# Patient Record
Sex: FEMALE | Race: WHITE | NOT HISPANIC OR LATINO | Employment: FULL TIME | URBAN - METROPOLITAN AREA
[De-identification: names, ages, dates, MRNs, and addresses within clinical notes are randomized per-mention and may not be internally consistent; named-entity substitution may affect disease eponyms.]

---

## 2023-12-18 ENCOUNTER — APPOINTMENT (OUTPATIENT)
Dept: RADIOLOGY | Facility: HOSPITAL | Age: 73
End: 2023-12-18
Payer: MEDICARE

## 2023-12-18 ENCOUNTER — HOSPITAL ENCOUNTER (EMERGENCY)
Facility: HOSPITAL | Age: 73
Discharge: HOME | End: 2023-12-18
Payer: MEDICARE

## 2023-12-18 VITALS
BODY MASS INDEX: 25.59 KG/M2 | TEMPERATURE: 98.1 F | WEIGHT: 149.91 LBS | RESPIRATION RATE: 15 BRPM | HEIGHT: 64 IN | OXYGEN SATURATION: 98 % | SYSTOLIC BLOOD PRESSURE: 150 MMHG | DIASTOLIC BLOOD PRESSURE: 89 MMHG | HEART RATE: 75 BPM

## 2023-12-18 DIAGNOSIS — M25.562 ACUTE PAIN OF LEFT KNEE: Primary | ICD-10-CM

## 2023-12-18 PROCEDURE — 2500000004 HC RX 250 GENERAL PHARMACY W/ HCPCS (ALT 636 FOR OP/ED): Performed by: EMERGENCY MEDICINE

## 2023-12-18 PROCEDURE — 99283 EMERGENCY DEPT VISIT LOW MDM: CPT | Mod: 25

## 2023-12-18 PROCEDURE — 73564 X-RAY EXAM KNEE 4 OR MORE: CPT | Mod: LT,FY

## 2023-12-18 RX ORDER — PREDNISONE 20 MG/1
40 TABLET ORAL ONCE
Status: COMPLETED | OUTPATIENT
Start: 2023-12-18 | End: 2023-12-18

## 2023-12-18 RX ORDER — METHYLPREDNISOLONE 4 MG/1
TABLET ORAL
Qty: 21 TABLET | Refills: 0 | Status: SHIPPED | OUTPATIENT
Start: 2023-12-18 | End: 2023-12-25

## 2023-12-18 RX ADMIN — PREDNISONE 40 MG: 20 TABLET ORAL at 05:04

## 2023-12-18 ASSESSMENT — COLUMBIA-SUICIDE SEVERITY RATING SCALE - C-SSRS
2. HAVE YOU ACTUALLY HAD ANY THOUGHTS OF KILLING YOURSELF?: NO
1. IN THE PAST MONTH, HAVE YOU WISHED YOU WERE DEAD OR WISHED YOU COULD GO TO SLEEP AND NOT WAKE UP?: NO
6. HAVE YOU EVER DONE ANYTHING, STARTED TO DO ANYTHING, OR PREPARED TO DO ANYTHING TO END YOUR LIFE?: NO

## 2023-12-18 ASSESSMENT — PAIN DESCRIPTION - DESCRIPTORS: DESCRIPTORS: ACHING

## 2023-12-18 NOTE — DISCHARGE INSTRUCTIONS
Continue using Tylenol for pain.  You can use ice for pain and swelling.  Use your knee brace to help stabilize your knee.    Follow-up with your orthopedic doctor as planned.    Turn to the ER if you have increased pain swelling or new symptoms like redness.

## 2023-12-18 NOTE — ED PROVIDER NOTES
HPI   Chief Complaint   Patient presents with    Knee Pain     Pt states she has chronic left knee pain due to arthritits.  Pt states that the pain started getting worse and swelling Friday.  Pt states she went to  and had xrays done that showed to fractures.       73-year-old female with a history of bilateral knee pain and arthritis presents ER with acute knee pain on the left that started around Friday.  Patient gets increased pain in the joints when she exerts her and has had similar joint swelling in the past.  Chronic pain from osteoarthritis.  This is more severe increased pain and swelling.  No redness fever or direct trauma.  She twisted her knee on Friday.  She was seen at urgent care and given crutches.  No imaging.  No improvement with Tylenol.    No pain in the hip or ankle.  Walking with crutches.    Has appointment with physical therapy in the future.  Plans to make an appointment with orthopedics.  History of getting gel shots in the knee.                          No data recorded                Patient History   No past medical history on file.  No past surgical history on file.  No family history on file.  Social History     Tobacco Use    Smoking status: Not on file    Smokeless tobacco: Not on file   Substance Use Topics    Alcohol use: Not on file    Drug use: Not on file       Physical Exam   ED Triage Vitals [12/18/23 0406]   Temp Heart Rate Resp BP   36.7 °C (98.1 °F) 75 15 150/89      SpO2 Temp Source Heart Rate Source Patient Position   98 % Oral Monitor Sitting      BP Location FiO2 (%)     Right arm --       Physical Exam  Vitals and nursing note reviewed.     Left leg  General: Well-appearing, no distress.  Vascular: Dorsalis pedis pulse present, normal color  Neuro: Normal sensation in the left leg.  Normal dorsi and plantarflex  Musculoskeletal: No ankle or distal tib-fib tenderness.  Patient has diffuse moderate swelling of the knee.  Tender anterior knee.  There is a patella  slightly obscured by swelling.  No laxity.  Exam of laxity limited by swelling and pain.  No ecchymotic  Skin: No ecchymosis or erythema.  Not warm to touch    ED Course & MDM   Diagnoses as of 12/18/23 0449   Acute pain of left knee       Medical Decision Making  Left knee pain: Acute knee pain after twisting the knee.  Neurovascular intact.  No signs of infection.  No history that would suggest knee dislocation.  Possible internal derangement of the knee.  Exam of laxity limited by swelling and pain.  No fracture on x-rays interpreted by me.  Mechanism is not consistent with fracture.  Patient is using crutches weightbearing as tolerated.  She will use a knee brace that she had for a knee fracture in the past.  Refused knee immobilizer.    Patient has appointment with orthopedic and joint pain specialist.  Prescribed prednisone at her request since that has helped in the past for knee pain and swelling    Patient will return to the ER if her knee pain does not improve in 2 to 3 days or gets worse including new symptoms like fever redness and weakness or laxity in the joint.    Discussed all test result, the clinical pression, treatment the ER and at home.  Patient agreed with plan.    Discharged home stable        Procedure  Procedures     Omar Browne MD  12/18/23 7908

## 2023-12-26 ENCOUNTER — TELEPHONE (OUTPATIENT)
Dept: PHYSICAL MEDICINE AND REHAB | Facility: CLINIC | Age: 73
End: 2023-12-26
Payer: MEDICARE

## 2023-12-26 NOTE — TELEPHONE ENCOUNTER
Per central scheduling, pt wants an MRI ordered prior to appt.  I called pt, she is new, cannot order anything until seen/ evaluated.  Reiterated she is on the CXL list and I will let her know if a CXL arises for her (appt is 1/9/24)

## 2024-01-02 ENCOUNTER — OFFICE VISIT (OUTPATIENT)
Dept: PRIMARY CARE | Facility: CLINIC | Age: 74
End: 2024-01-02
Payer: MEDICARE

## 2024-01-02 ENCOUNTER — LAB (OUTPATIENT)
Dept: LAB | Facility: LAB | Age: 74
End: 2024-01-02
Payer: MEDICARE

## 2024-01-02 VITALS
RESPIRATION RATE: 16 BRPM | SYSTOLIC BLOOD PRESSURE: 156 MMHG | BODY MASS INDEX: 25.33 KG/M2 | DIASTOLIC BLOOD PRESSURE: 84 MMHG | TEMPERATURE: 97.7 F | OXYGEN SATURATION: 99 % | HEIGHT: 64 IN | WEIGHT: 148.4 LBS | HEART RATE: 69 BPM

## 2024-01-02 DIAGNOSIS — R03.0 ELEVATED BLOOD PRESSURE READING: ICD-10-CM

## 2024-01-02 DIAGNOSIS — E03.9 HYPOTHYROIDISM, UNSPECIFIED TYPE: ICD-10-CM

## 2024-01-02 DIAGNOSIS — M25.562 ARTHRALGIA OF LEFT KNEE: ICD-10-CM

## 2024-01-02 DIAGNOSIS — M54.50 LUMBAR SPINE PAIN: Primary | ICD-10-CM

## 2024-01-02 LAB
TSH SERPL-ACNC: 1.25 MIU/L (ref 0.44–3.98)
URATE SERPL-MCNC: 4 MG/DL (ref 2.3–6.7)

## 2024-01-02 PROCEDURE — 99214 OFFICE O/P EST MOD 30 MIN: CPT | Performed by: NURSE PRACTITIONER

## 2024-01-02 PROCEDURE — 1036F TOBACCO NON-USER: CPT | Performed by: NURSE PRACTITIONER

## 2024-01-02 PROCEDURE — 36415 COLL VENOUS BLD VENIPUNCTURE: CPT

## 2024-01-02 PROCEDURE — 1159F MED LIST DOCD IN RCRD: CPT | Performed by: NURSE PRACTITIONER

## 2024-01-02 PROCEDURE — 84550 ASSAY OF BLOOD/URIC ACID: CPT

## 2024-01-02 PROCEDURE — 84443 ASSAY THYROID STIM HORMONE: CPT

## 2024-01-02 PROCEDURE — 1125F AMNT PAIN NOTED PAIN PRSNT: CPT | Performed by: NURSE PRACTITIONER

## 2024-01-02 PROCEDURE — 99204 OFFICE O/P NEW MOD 45 MIN: CPT | Performed by: NURSE PRACTITIONER

## 2024-01-02 RX ORDER — ATORVASTATIN CALCIUM 20 MG/1
1 TABLET, FILM COATED ORAL DAILY
COMMUNITY
Start: 2015-08-09 | End: 2024-05-20

## 2024-01-02 RX ORDER — LEVOTHYROXINE SODIUM 88 UG/1
88 TABLET ORAL DAILY
COMMUNITY
End: 2024-05-09 | Stop reason: SDUPTHER

## 2024-01-02 RX ORDER — LISINOPRIL 10 MG/1
10 TABLET ORAL DAILY
COMMUNITY
End: 2024-01-02

## 2024-01-02 RX ORDER — LISINOPRIL 10 MG/1
10 TABLET ORAL DAILY
COMMUNITY
Start: 2016-03-05

## 2024-01-02 RX ORDER — LEVOTHYROXINE SODIUM 88 UG/1
CAPSULE ORAL
COMMUNITY
End: 2024-01-02

## 2024-01-02 ASSESSMENT — ENCOUNTER SYMPTOMS
WEAKNESS: 1
NUMBNESS: 1
BACK PAIN: 1
LEG PAIN: 1
JOINT SWELLING: 1
ARTHRALGIAS: 1

## 2024-01-02 ASSESSMENT — PAIN SCALES - GENERAL: PAINLEVEL: 8

## 2024-01-02 ASSESSMENT — PATIENT HEALTH QUESTIONNAIRE - PHQ9
1. LITTLE INTEREST OR PLEASURE IN DOING THINGS: NOT AT ALL
SUM OF ALL RESPONSES TO PHQ9 QUESTIONS 1 AND 2: 0
2. FEELING DOWN, DEPRESSED OR HOPELESS: NOT AT ALL

## 2024-01-02 NOTE — PROGRESS NOTES
"Subjective   Patient ID: July Castillo is a 73 y.o. female who presents for new pt (New pt. Pt here for back pain that radiates down left leg. Pt states this  was aggravated during her move in October. Pt will be seeing pain mgmt on 1/9/24).    Daughter is a nurse  anessthesist at [employer] , Just moved from [place]  to be with her family ansd grandchildren Has been back for a few months,  pt used to see jared team has had ongoing back pain, . Has been treated for with injections for her back, for years .,has knee pain left knee which is new  was seen in urgent care for this, has an appointment with pain management. A week from today jan 9th. Has had gel injection S in both knees. Has had no issues, with ijjections ,seeing laila teran management.  Here to get established    Back Pain  This is a chronic problem. The current episode started more than 1 year ago. The problem occurs constantly. The problem has been rapidly worsening since onset. The pain is present in the lumbar spine. The quality of the pain is described as shooting and burning. The pain radiates to the left knee. The pain is moderate. The symptoms are aggravated by standing and lying down. Stiffness is present All day. Associated symptoms include leg pain, numbness and weakness. Risk factors include menopause and history of osteoporosis. She has tried ice for the symptoms. The treatment provided moderate relief.        Review of Systems   Endocrine:        Reviwed thyroid results from prec=vious doc, tyroid labs off   Musculoskeletal:  Positive for arthralgias, back pain and joint swelling.   Neurological:  Positive for weakness and numbness.       Objective   /84   Pulse 69   Temp 36.5 °C (97.7 °F)   Resp 16   Ht 1.626 m (5' 4\")   Wt 67.3 kg (148 lb 6.4 oz)   SpO2 99%   BMI 25.47 kg/m²     Physical Exam  Constitutional:       Appearance: Normal appearance.   HENT:      Head: Normocephalic.   Cardiovascular:      Rate and Rhythm: " Normal rate and regular rhythm.   Musculoskeletal:      Cervical back: Normal range of motion and neck supple.      Comments: Left knee very swollen tender to touch around minicus, gait nl tender ROM  has lower back pain  reviewed xray and mri reports from another institution.     Neurological:      Mental Status: She is alert.         Assessment/Plan   Problem List Items Addressed This Visit    None  Visit Diagnoses         Codes    Lumbar spine pain    -  Primary M54.50    Relevant Orders    MR lumbar spine wo IV contrast    Arthralgia of left knee     M25.562    Relevant Orders    Referral to Orthopaedic Surgery    MR knee left wo IV contrast    Uric acid    Hypothyroidism, unspecified type     E03.9    Relevant Orders    Tsh With Reflex To Free T4 If Abnormal    Elevated blood pressure reading     R03.0

## 2024-01-02 NOTE — PATIENT INSTRUCTIONS
Practice explained discussed treatment will see ortho for knee will follow up  with pain management for back pain. Return for well visit when free pleasure to take care of you .

## 2024-01-09 ENCOUNTER — ANCILLARY PROCEDURE (OUTPATIENT)
Dept: RADIOLOGY | Facility: CLINIC | Age: 74
End: 2024-01-09
Payer: MEDICARE

## 2024-01-09 ENCOUNTER — APPOINTMENT (OUTPATIENT)
Dept: PHYSICAL MEDICINE AND REHAB | Facility: CLINIC | Age: 74
End: 2024-01-09
Payer: MEDICARE

## 2024-01-09 DIAGNOSIS — M54.50 LUMBAR SPINE PAIN: ICD-10-CM

## 2024-01-09 DIAGNOSIS — M25.562 ARTHRALGIA OF LEFT KNEE: ICD-10-CM

## 2024-01-09 PROCEDURE — 72148 MRI LUMBAR SPINE W/O DYE: CPT

## 2024-01-09 PROCEDURE — 73721 MRI JNT OF LWR EXTRE W/O DYE: CPT | Mod: LT

## 2024-02-06 ENCOUNTER — APPOINTMENT (OUTPATIENT)
Dept: PHYSICAL MEDICINE AND REHAB | Facility: CLINIC | Age: 74
End: 2024-02-06
Payer: MEDICARE

## 2024-03-28 ENCOUNTER — PATIENT MESSAGE (OUTPATIENT)
Dept: PRIMARY CARE | Facility: CLINIC | Age: 74
End: 2024-03-28
Payer: MEDICARE

## 2024-03-28 DIAGNOSIS — L98.9 SORE ON SCALP: ICD-10-CM

## 2024-03-28 RX ORDER — CLINDAMYCIN PHOSPHATE 11.9 MG/ML
SOLUTION TOPICAL DAILY
Qty: 60 ML | Refills: 0 | Status: SHIPPED | OUTPATIENT
Start: 2024-03-28 | End: 2024-05-03 | Stop reason: WASHOUT

## 2024-03-28 RX ORDER — CLINDAMYCIN PHOSPHATE 11.9 MG/ML
SOLUTION TOPICAL 2 TIMES DAILY
Qty: 1 ML | Refills: 11 | Status: SHIPPED | OUTPATIENT
Start: 2024-03-28 | End: 2025-03-28

## 2024-03-28 NOTE — TELEPHONE ENCOUNTER
From: July Castillo  To: Concepcion Hess, APRN-CNP  Sent: 3/28/2024 10:52 AM EDT  Subject: Clindamycin Phosphate Topical Solution USP, 1%    Good morning Concepcion & staff ~ happy spring! Doing well in my new home & am meeting in the coming days with Dr. Wright, neurological surgery, to discuss my lumbar spine issues. My daughter in law works with him at [facility] & he receives excellent reviews. I’ll keep you updated. If surgery is required, I will choose either Dr Wright or Dr Huffman.    Regarding the above subject solution, I have been using this topically for my occasional scalp sores that are a result of food allergies. Could you please call in a new prescription to [facility] or would you need to see me first?    As always, thank you & enjoy the holiday weekend! Kindest regards ~ July Castillo   [phone]

## 2024-04-01 DIAGNOSIS — M47.816 LUMBAR SPONDYLOSIS: ICD-10-CM

## 2024-04-03 NOTE — PROGRESS NOTES
Chief Complaint:   July Castillo is a very nice 73 y.o. woman here for evaluation of low back pain, leg pain and weakness, lumbar spinal stenosis .    HPI  Ms. Castillo has history of chronic low back pain that started almost 10 years ago.  She has lived both here in [place] but also in [place].  Recently, she moved back to [place] permanently to be closer to her Grandchildren.  The pain goes down both legs posterior lateral thigh more in the left leg and more anterior thigh with numbness and tingling in the right leg.  Her leg pain bothers her more than her low back pain.  Bending forward to tie her shoes for example is extremely painful.  She has had injections by pain management for her low back symptoms over the years both here in [place] and in [place] with temporary relief.  She did aggressive land and aquatic physical therapy in [place] about 2 years ago and continues to do home exercises, but starting in January her pain got so severe and her left leg feels weak that she is not able to do the exercises as well.  She takes Tylenol for her pain as well and is allergic to NSAIDs.  She is a never smoker.  She does have osteopenia and takes calcium and vitamin D for this.      Review of Systems  All other systems reviewed and negative other than what is already stated in this note.      Past Medical History:   Diagnosis Date    Allergic 2010    Arthritis 2010    Disease of thyroid gland 2010    Heart murmur Child    Hypertension 2010       There is no problem list on file for this patient.      Past Surgical History:   Procedure Laterality Date    WISDOM TOOTH EXTRACTION  1980       Family History   Problem Relation Name Age of Onset    Arthritis Mother Aurora     Hypertension Mother Aurora     Kidney disease Mother Aurora     Heart disease Father Tate     Hypertension Father Tate     Stroke Father Tate        Social History     Tobacco Use    Smoking status: Never    Smokeless tobacco: Never   Substance Use  Topics    Alcohol use: Yes     Alcohol/week: 3.0 standard drinks of alcohol     Types: 3 Glasses of wine per week    Drug use: Never         Current Outpatient Medications:     atorvastatin (Lipitor) 20 mg tablet, Take 1 tablet (20 mg) by mouth once daily., Disp: , Rfl:     clindamycin (Cleocin T) 1 % external solution, Apply topically once daily., Disp: 60 mL, Rfl: 0    clindamycin (Cleocin T) 1 % external solution, Apply topically 2 times a day., Disp: 1 mL, Rfl: 11    levothyroxine (Synthroid, Levoxyl) 88 mcg tablet, Take 1 tablet (88 mcg) by mouth once daily., Disp: , Rfl:     lisinopril 10 mg tablet, , Disp: , Rfl:         Objective   There were no vitals filed for this visit.    no acute distress, well developed woman appearing her  stated age  normal sclera  moist mucus membranes  no peripheral edema   symmetric chest rise  nondistended abdomen  alert and oriented, pupils equal and round, extraocular movements intact, full strength in all extremities, normal sensation to light touch throughout, normal symmetric reflexes, no dysmetria  normal mood      Assessment/Plan   I personally reviewed MRI of the lumbar spine done on 1/9/2024 and upright x-rays of the lumbar spine done today.  There is multilevel degenerative disc disease worst at the L3-4 and L4-5 levels consistent with lumbar spondylosis.  There is mobile spondylolisthesis at the L4-5 level with 5 mm of anterolisthesis on lying down MRI and 8 mm of anterolisthesis on standing upright x-ray.  There is severe spinal canal stenosis at the L4-5 level with near obliteration of the spinal canal at this level.  There is a moderate spinal canal stenosis at the L3-4 level and severe right foraminal stenosis at the L3-4 level.  Because of the severity of neural compression, mobile spondylolisthesis, symptoms of neurogenic claudication and lumbar radiculopathy that has failed the conservative measures listed in the HPI section including extensive physical therapy  and home exercises and injections by pain management, I recommended surgery via a L3-L5 posterior lumbar laminectomy, right side facetectomies and instrumented transforaminal lumbar interbody fusion (TLIF).  I explained the surgery and risks of the surgery including weakness, numbness, csf leak, infection, pseudoarthrosis, adjacent segment disease, hardware failure, and spinal instability.         Demetrius Wright MD

## 2024-04-04 ENCOUNTER — OFFICE VISIT (OUTPATIENT)
Dept: NEUROSURGERY | Facility: CLINIC | Age: 74
End: 2024-04-04
Payer: MEDICARE

## 2024-04-04 ENCOUNTER — HOSPITAL ENCOUNTER (OUTPATIENT)
Dept: RADIOLOGY | Facility: HOSPITAL | Age: 74
Discharge: HOME | End: 2024-04-04
Payer: MEDICARE

## 2024-04-04 ENCOUNTER — TELEPHONE (OUTPATIENT)
Dept: PRIMARY CARE | Facility: CLINIC | Age: 74
End: 2024-04-04

## 2024-04-04 VITALS
HEART RATE: 80 BPM | DIASTOLIC BLOOD PRESSURE: 87 MMHG | SYSTOLIC BLOOD PRESSURE: 164 MMHG | HEIGHT: 64 IN | BODY MASS INDEX: 25.27 KG/M2 | WEIGHT: 148 LBS

## 2024-04-04 DIAGNOSIS — M47.816 LUMBAR SPONDYLOSIS: ICD-10-CM

## 2024-04-04 DIAGNOSIS — M81.0 AGE RELATED OSTEOPOROSIS, UNSPECIFIED PATHOLOGICAL FRACTURE PRESENCE: Primary | ICD-10-CM

## 2024-04-04 DIAGNOSIS — M54.16 LUMBAR RADICULOPATHY: ICD-10-CM

## 2024-04-04 DIAGNOSIS — M47.27 LUMBOSACRAL SPONDYLOSIS WITH RADICULOPATHY: Primary | ICD-10-CM

## 2024-04-04 DIAGNOSIS — M43.17 ACQUIRED SPONDYLOLISTHESIS OF LUMBOSACRAL REGION: ICD-10-CM

## 2024-04-04 DIAGNOSIS — Z12.31 ENCOUNTER FOR SCREENING MAMMOGRAM FOR MALIGNANT NEOPLASM OF BREAST: Primary | ICD-10-CM

## 2024-04-04 PROCEDURE — 1125F AMNT PAIN NOTED PAIN PRSNT: CPT | Performed by: NEUROLOGICAL SURGERY

## 2024-04-04 PROCEDURE — 72100 X-RAY EXAM L-S SPINE 2/3 VWS: CPT | Performed by: RADIOLOGY

## 2024-04-04 PROCEDURE — 72100 X-RAY EXAM L-S SPINE 2/3 VWS: CPT

## 2024-04-04 PROCEDURE — 1159F MED LIST DOCD IN RCRD: CPT | Performed by: NEUROLOGICAL SURGERY

## 2024-04-04 PROCEDURE — 99205 OFFICE O/P NEW HI 60 MIN: CPT | Performed by: NEUROLOGICAL SURGERY

## 2024-04-04 ASSESSMENT — PAIN SCALES - GENERAL: PAINLEVEL: 8

## 2024-04-04 ASSESSMENT — ENCOUNTER SYMPTOMS
LOSS OF SENSATION IN FEET: 0
OCCASIONAL FEELINGS OF UNSTEADINESS: 0
DEPRESSION: 0

## 2024-04-09 ENCOUNTER — HOSPITAL ENCOUNTER (OUTPATIENT)
Dept: RADIOLOGY | Facility: HOSPITAL | Age: 74
Discharge: HOME | End: 2024-04-09
Payer: MEDICARE

## 2024-04-09 DIAGNOSIS — M81.0 AGE RELATED OSTEOPOROSIS, UNSPECIFIED PATHOLOGICAL FRACTURE PRESENCE: ICD-10-CM

## 2024-04-09 PROBLEM — M47.27 LUMBOSACRAL SPONDYLOSIS WITH RADICULOPATHY: Status: ACTIVE | Noted: 2024-04-04

## 2024-04-09 PROBLEM — M43.17 ACQUIRED SPONDYLOLISTHESIS OF LUMBOSACRAL REGION: Status: ACTIVE | Noted: 2024-04-04

## 2024-04-09 PROCEDURE — 77080 DXA BONE DENSITY AXIAL: CPT | Performed by: RADIOLOGY

## 2024-04-09 PROCEDURE — 77080 DXA BONE DENSITY AXIAL: CPT

## 2024-04-12 ENCOUNTER — HOSPITAL ENCOUNTER (OUTPATIENT)
Dept: RADIOLOGY | Facility: HOSPITAL | Age: 74
Discharge: HOME | End: 2024-04-12
Payer: MEDICARE

## 2024-04-12 VITALS — WEIGHT: 148 LBS | HEIGHT: 64 IN | BODY MASS INDEX: 25.27 KG/M2

## 2024-04-12 DIAGNOSIS — Z12.31 ENCOUNTER FOR SCREENING MAMMOGRAM FOR MALIGNANT NEOPLASM OF BREAST: ICD-10-CM

## 2024-04-12 PROCEDURE — 77063 BREAST TOMOSYNTHESIS BI: CPT | Performed by: STUDENT IN AN ORGANIZED HEALTH CARE EDUCATION/TRAINING PROGRAM

## 2024-04-12 PROCEDURE — 77067 SCR MAMMO BI INCL CAD: CPT

## 2024-04-12 PROCEDURE — 77067 SCR MAMMO BI INCL CAD: CPT | Performed by: STUDENT IN AN ORGANIZED HEALTH CARE EDUCATION/TRAINING PROGRAM

## 2024-05-03 ENCOUNTER — TELEMEDICINE CLINICAL SUPPORT (OUTPATIENT)
Dept: PREADMISSION TESTING | Facility: HOSPITAL | Age: 74
End: 2024-05-03
Payer: MEDICARE

## 2024-05-03 DIAGNOSIS — M47.27 LUMBOSACRAL SPONDYLOSIS WITH RADICULOPATHY: ICD-10-CM

## 2024-05-03 DIAGNOSIS — M43.17 ACQUIRED SPONDYLOLISTHESIS OF LUMBOSACRAL REGION: ICD-10-CM

## 2024-05-03 RX ORDER — SPIRONOLACTONE 25 MG
1 TABLET ORAL DAILY
COMMUNITY

## 2024-05-03 RX ORDER — DOCUSATE SODIUM 100 MG/1
100 CAPSULE, LIQUID FILLED ORAL NIGHTLY
COMMUNITY

## 2024-05-03 RX ORDER — ACETAMINOPHEN 500 MG
1000 TABLET ORAL EVERY 6 HOURS PRN
COMMUNITY
End: 2024-05-27 | Stop reason: HOSPADM

## 2024-05-09 ENCOUNTER — PATIENT MESSAGE (OUTPATIENT)
Dept: PRIMARY CARE | Facility: CLINIC | Age: 74
End: 2024-05-09
Payer: MEDICARE

## 2024-05-09 DIAGNOSIS — E03.9 HYPOTHYROIDISM, UNSPECIFIED TYPE: Primary | ICD-10-CM

## 2024-05-09 RX ORDER — LEVOTHYROXINE SODIUM 88 UG/1
88 TABLET ORAL DAILY
Qty: 90 TABLET | Refills: 3 | Status: SHIPPED | OUTPATIENT
Start: 2024-05-09 | End: 2025-05-09

## 2024-05-10 ENCOUNTER — LAB (OUTPATIENT)
Dept: LAB | Facility: LAB | Age: 74
End: 2024-05-10
Payer: MEDICARE

## 2024-05-10 ENCOUNTER — PRE-ADMISSION TESTING (OUTPATIENT)
Dept: PREADMISSION TESTING | Facility: HOSPITAL | Age: 74
End: 2024-05-10
Payer: MEDICARE

## 2024-05-10 VITALS
HEIGHT: 62 IN | WEIGHT: 143.3 LBS | HEART RATE: 71 BPM | SYSTOLIC BLOOD PRESSURE: 162 MMHG | OXYGEN SATURATION: 100 % | DIASTOLIC BLOOD PRESSURE: 70 MMHG | RESPIRATION RATE: 16 BRPM | BODY MASS INDEX: 26.37 KG/M2 | TEMPERATURE: 97.9 F

## 2024-05-10 DIAGNOSIS — R73.9 ELEVATED BLOOD SUGAR: ICD-10-CM

## 2024-05-10 DIAGNOSIS — Z01.818 PREOP TESTING: Primary | ICD-10-CM

## 2024-05-10 DIAGNOSIS — R06.02 SOB (SHORTNESS OF BREATH): ICD-10-CM

## 2024-05-10 DIAGNOSIS — Z01.818 PREOP TESTING: ICD-10-CM

## 2024-05-10 DIAGNOSIS — E03.9 HYPOTHYROIDISM, UNSPECIFIED TYPE: ICD-10-CM

## 2024-05-10 LAB
ABO GROUP (TYPE) IN BLOOD: NORMAL
ANION GAP SERPL CALC-SCNC: 12 MMOL/L (ref 10–20)
ANTIBODY SCREEN: NORMAL
APPEARANCE UR: CLEAR
APTT PPP: 35 SECONDS (ref 27–38)
ATRIAL RATE: 62 BPM
BASOPHILS # BLD AUTO: 0.05 X10*3/UL (ref 0–0.1)
BASOPHILS NFR BLD AUTO: 0.6 %
BILIRUB UR STRIP.AUTO-MCNC: NEGATIVE MG/DL
BUN SERPL-MCNC: 18 MG/DL (ref 6–23)
CALCIUM SERPL-MCNC: 10.2 MG/DL (ref 8.6–10.3)
CHLORIDE SERPL-SCNC: 101 MMOL/L (ref 98–107)
CO2 SERPL-SCNC: 28 MMOL/L (ref 21–32)
COLOR UR: COLORLESS
CREAT SERPL-MCNC: 0.91 MG/DL (ref 0.5–1.05)
EGFRCR SERPLBLD CKD-EPI 2021: 67 ML/MIN/1.73M*2
EOSINOPHIL # BLD AUTO: 0.11 X10*3/UL (ref 0–0.4)
EOSINOPHIL NFR BLD AUTO: 1.2 %
ERYTHROCYTE [DISTWIDTH] IN BLOOD BY AUTOMATED COUNT: 12.7 % (ref 11.5–14.5)
EST. AVERAGE GLUCOSE BLD GHB EST-MCNC: 105 MG/DL
GLUCOSE SERPL-MCNC: 93 MG/DL (ref 74–99)
GLUCOSE UR STRIP.AUTO-MCNC: NORMAL MG/DL
HBA1C MFR BLD: 5.3 %
HCT VFR BLD AUTO: 40.5 % (ref 36–46)
HGB BLD-MCNC: 13.5 G/DL (ref 12–16)
HOLD SPECIMEN: NORMAL
IMM GRANULOCYTES # BLD AUTO: 0.03 X10*3/UL (ref 0–0.5)
IMM GRANULOCYTES NFR BLD AUTO: 0.3 % (ref 0–0.9)
INR PPP: 1 (ref 0.9–1.1)
KETONES UR STRIP.AUTO-MCNC: NEGATIVE MG/DL
LEUKOCYTE ESTERASE UR QL STRIP.AUTO: NEGATIVE
LYMPHOCYTES # BLD AUTO: 2.02 X10*3/UL (ref 0.8–3)
LYMPHOCYTES NFR BLD AUTO: 22.9 %
MCH RBC QN AUTO: 32.2 PG (ref 26–34)
MCHC RBC AUTO-ENTMCNC: 33.3 G/DL (ref 32–36)
MCV RBC AUTO: 97 FL (ref 80–100)
MONOCYTES # BLD AUTO: 0.51 X10*3/UL (ref 0.05–0.8)
MONOCYTES NFR BLD AUTO: 5.8 %
NEUTROPHILS # BLD AUTO: 6.1 X10*3/UL (ref 1.6–5.5)
NEUTROPHILS NFR BLD AUTO: 69.2 %
NITRITE UR QL STRIP.AUTO: NEGATIVE
NRBC BLD-RTO: 0 /100 WBCS (ref 0–0)
P AXIS: 65 DEGREES
P OFFSET: 197 MS
P ONSET: 138 MS
PH UR STRIP.AUTO: 5.5 [PH]
PLATELET # BLD AUTO: 246 X10*3/UL (ref 150–450)
POTASSIUM SERPL-SCNC: 3.9 MMOL/L (ref 3.5–5.3)
PR INTERVAL: 176 MS
PROT UR STRIP.AUTO-MCNC: NEGATIVE MG/DL
PROTHROMBIN TIME: 10.8 SECONDS (ref 9.8–12.8)
Q ONSET: 226 MS
QRS COUNT: 10 BEATS
QRS DURATION: 90 MS
QT INTERVAL: 408 MS
QTC CALCULATION(BAZETT): 414 MS
QTC FREDERICIA: 412 MS
R AXIS: 62 DEGREES
RBC # BLD AUTO: 4.19 X10*6/UL (ref 4–5.2)
RBC # UR STRIP.AUTO: NEGATIVE /UL
RH FACTOR (ANTIGEN D): NORMAL
SODIUM SERPL-SCNC: 137 MMOL/L (ref 136–145)
SP GR UR STRIP.AUTO: 1.01
T AXIS: 73 DEGREES
T OFFSET: 430 MS
TSH SERPL-ACNC: 2.6 MIU/L (ref 0.44–3.98)
UROBILINOGEN UR STRIP.AUTO-MCNC: NORMAL MG/DL
VENTRICULAR RATE: 62 BPM
WBC # BLD AUTO: 8.8 X10*3/UL (ref 4.4–11.3)

## 2024-05-10 PROCEDURE — 83036 HEMOGLOBIN GLYCOSYLATED A1C: CPT

## 2024-05-10 PROCEDURE — 85610 PROTHROMBIN TIME: CPT

## 2024-05-10 PROCEDURE — 86901 BLOOD TYPING SEROLOGIC RH(D): CPT

## 2024-05-10 PROCEDURE — 80048 BASIC METABOLIC PNL TOTAL CA: CPT

## 2024-05-10 PROCEDURE — 93005 ELECTROCARDIOGRAM TRACING: CPT | Performed by: PHYSICIAN ASSISTANT

## 2024-05-10 PROCEDURE — 86850 RBC ANTIBODY SCREEN: CPT

## 2024-05-10 PROCEDURE — 36415 COLL VENOUS BLD VENIPUNCTURE: CPT

## 2024-05-10 PROCEDURE — 99204 OFFICE O/P NEW MOD 45 MIN: CPT | Performed by: PHYSICIAN ASSISTANT

## 2024-05-10 PROCEDURE — 85730 THROMBOPLASTIN TIME PARTIAL: CPT

## 2024-05-10 PROCEDURE — 84443 ASSAY THYROID STIM HORMONE: CPT

## 2024-05-10 PROCEDURE — 81003 URINALYSIS AUTO W/O SCOPE: CPT

## 2024-05-10 PROCEDURE — 86900 BLOOD TYPING SEROLOGIC ABO: CPT

## 2024-05-10 PROCEDURE — 87081 CULTURE SCREEN ONLY: CPT | Mod: AHULAB | Performed by: NEUROLOGICAL SURGERY

## 2024-05-10 PROCEDURE — 85025 COMPLETE CBC W/AUTO DIFF WBC: CPT

## 2024-05-10 RX ORDER — CHLORHEXIDINE GLUCONATE ORAL RINSE 1.2 MG/ML
SOLUTION DENTAL
Qty: 473 ML | Refills: 0 | Status: SHIPPED | OUTPATIENT
Start: 2024-05-10 | End: 2024-05-27 | Stop reason: HOSPADM

## 2024-05-10 ASSESSMENT — ENCOUNTER SYMPTOMS
CARDIOVASCULAR NEGATIVE: 1
CONSTITUTIONAL NEGATIVE: 1
RESPIRATORY NEGATIVE: 1
NEUROLOGICAL NEGATIVE: 1
PSYCHIATRIC NEGATIVE: 1
BACK PAIN: 1
GASTROINTESTINAL NEGATIVE: 1
EYES NEGATIVE: 1
ENDOCRINE NEGATIVE: 1
HEMATOLOGIC/LYMPHATIC NEGATIVE: 1
ALLERGIC/IMMUNOLOGIC NEGATIVE: 1

## 2024-05-10 NOTE — CPM/PAT H&P
[facility]/PAT Evaluation       Name: July Castillo (July Castillo)  /Age: 1950/74 y.o.     In-Person       Chief Complaint: Lumbosacral spondylosis with radiculopathy / Acquired spondylolisthesis of lumbosacral region     HPI    Date of Consult: 05/10/24    Referring Provider: Dr. Wright    Surgery, Date, and Length: L3-L5 Transforaminal Lumbar Interbody Fusion; Bilateral Lumbar Laminectomies; Right Side Facetectomies - Bilateral ; 24; 240 minutes     July Castillo  is a 74 year-old female who presents to the [facility] for perioperative risk assessment prior to surgery. Patient has history of chronic low back pain that started almost 10 years ago.  The pain goes down both legs posterior lateral thigh more in the left leg and more anterior thigh with numbness and tingling in the right leg.  Her leg pain bothers her more than her low back pain.  Bending forward to tie her shoes for example is extremely painful.  She has had injections by pain management for her low back symptoms over the years both here in [place] and in [place] with temporary relief.  She did aggressive land and aquatic physical therapy in [place] about 2 years ago and continues to do home exercises, but starting in January her pain got so severe and her left leg feels weak that she is not able to do the exercises as well.  She takes Tylenol for her pain as well and is allergic to NSAIDs.      This note was created in part upon personal review of patient's medical records.      Patient is scheduled to have L3-L5 Transforaminal Lumbar Interbody Fusion; Bilateral Lumbar Laminectomies; Right Side Facetectomies - Bilateral     Medical History  Past Medical History:   Diagnosis Date    Arthritis     Hyperlipidemia     Hypertension 2010    Hypothyroidism     Lumbosacral spondylosis with radiculopathy     Osteopenia     Vertigo     remote history        STOP BANG =   2   ( >51 yo, htn)    Caprini =   6  (age,surgery, BMI>25)    Surgical  History  Past Surgical History:   Procedure Laterality Date    COLONOSCOPY  06/22/2022    TUMOR EXCISION  2005    Pleomorphic adenoma of parotid    WISDOM TOOTH EXTRACTION  1980             Family history:  Family History   Problem Relation Name Age of Onset    Arthritis Mother Aurora     Hypertension Mother Aurora     Kidney disease Mother Aurora     Heart disease Father Tate     Hypertension Father Tate     Stroke Father Tate     Breast cancer Sister  68        Social history:  Social History     Socioeconomic History    Marital status:      Spouse name: Not on file    Number of children: Not on file    Years of education: Not on file    Highest education level: Not on file   Occupational History    Not on file   Tobacco Use    Smoking status: Never    Smokeless tobacco: Never   Vaping Use    Vaping status: Never Used   Substance and Sexual Activity    Alcohol use: Yes     Alcohol/week: 3.0 standard drinks of alcohol     Types: 3 Glasses of wine per week    Drug use: Never    Sexual activity: Not Currently     Birth control/protection: None   Other Topics Concern    Not on file   Social History Narrative    Not on file     Social Determinants of Health     Financial Resource Strain: Low Risk  (6/12/2022)    Received from [facility]    Overall Financial Resource Strain (CARDIA)     Difficulty of Paying Living Expenses: Not hard at all   Food Insecurity: No Food Insecurity (6/12/2022)    Received from [facility]    Hunger Vital Sign     Worried About Running Out of Food in the Last Year: Never true     Ran Out of Food in the Last Year: Never true   Transportation Needs: No Transportation Needs (6/12/2022)    Received from [facility]    PRAPARE - Transportation     Lack of Transportation (Medical): No     Lack of Transportation (Non-Medical): No   Physical Activity: Sufficiently Active (6/12/2022)    Received from [facility]    Exercise Vital Sign     Days of Exercise per Week: 6 days     Minutes of Exercise  per Session: 60 min   Stress: No Stress Concern Present (6/12/2022)    Received from [facility]    [employer] - Occupational Stress Questionnaire     Feeling of Stress : Only a little   Social Connections: Moderately Isolated (6/12/2022)    Received from [facility]    Social Connection and Isolation Panel [NHANES]     Frequency of Communication with Friends and Family: Three times a week     Frequency of Social Gatherings with Friends and Family: Twice a week     Attends Sabianist Services: Never     Active Member of Clubs or Organizations: Yes     Attends Club or Organization Meetings: 1 to 4 times per year     Marital Status:    Intimate Partner Violence: Not At Risk (6/12/2022)    Received from [facility]    Humiliation, Afraid, Rape, and Kick questionnaire     Fear of Current or Ex-Partner: No     Emotionally Abused: No     Physically Abused: No     Sexually Abused: No   Housing Stability: Low Risk  (6/12/2022)    Received from [facility]    Housing Stability Vital Sign     Unable to Pay for Housing in the Last Year: No     Number of Places Lived in the Last Year: 1     Unstable Housing in the Last Year: No          Current Outpatient Medications:     acetaminophen (Tylenol) 500 mg tablet, Take 2 tablets (1,000 mg) by mouth every 6 hours if needed for mild pain (1 - 3)., Disp: , Rfl:     ascorbic acid/collagen hydr (COLLAGEN SKIN RENEWAL ORAL), Take 1 Dose by mouth once daily. gummy, Disp: , Rfl:     atorvastatin (Lipitor) 20 mg tablet, Take 1 tablet (20 mg) by mouth once daily., Disp: , Rfl:     calcium carbonate-vitamin D3 (Calcium 600 with Vitamin D3) 600 mg-10 mcg (400 unit) chewable tablet, Chew 1 tablet once daily., Disp: , Rfl:     diphenhydrAMINE-acetaminophen (Tylenol PM)  mg per tablet, Take 1 tablet by mouth as needed at bedtime for sleep., Disp: , Rfl:     docusate sodium (Colace) 100 mg capsule, Take 1 capsule (100 mg) by mouth once daily at bedtime.,  "Disp: , Rfl:     levothyroxine (Synthroid, Levoxyl) 88 mcg tablet, Take 1 tablet (88 mcg) by mouth once daily., Disp: 90 tablet, Rfl: 3    lisinopril 10 mg tablet, Take 1 tablet (10 mg) by mouth once daily., Disp: , Rfl:     chlorhexidine (Peridex) 0.12 % solution, 15 ml swish and spit for 30 seconds night prior to surgery and morning of surgery, Disp: 473 mL, Rfl: 0    clindamycin (Cleocin T) 1 % external solution, Apply topically 2 times a day. (Patient not taking: Reported on 5/10/2024), Disp: 1 mL, Rfl: 11     Visit Vitals  /70   Pulse 71   Temp 36.6 °C (97.9 °F)   Resp 16   Ht 1.58 m (5' 2.21\")   Wt 65 kg (143 lb 4.8 oz)   LMP  (LMP Unknown)   SpO2 100%   BMI 26.04 kg/m²   OB Status Postmenopausal   Smoking Status Never   BSA 1.69 m²      Review of Systems   Constitutional: Negative.    HENT: Negative.     Eyes: Negative.         Glasses   Respiratory: Negative.     Cardiovascular: Negative.         METS  4   walking / swimming - limited only by back presently   Gastrointestinal: Negative.    Endocrine: Negative.    Genitourinary: Negative.    Musculoskeletal:  Positive for back pain.   Skin: Negative.    Allergic/Immunologic: Negative.    Neurological: Negative.    Hematological: Negative.    Psychiatric/Behavioral: Negative.          Physical Exam  Vitals reviewed.   Constitutional:       Appearance: Normal appearance.   HENT:      Head: Normocephalic and atraumatic.      Right Ear: External ear normal.      Left Ear: External ear normal.      Nose: Nose normal.      Mouth/Throat:      Pharynx: Oropharynx is clear.   Eyes:      Extraocular Movements: Extraocular movements intact.      Conjunctiva/sclera: Conjunctivae normal.      Pupils: Pupils are equal, round, and reactive to light.   Cardiovascular:      Rate and Rhythm: Normal rate and regular rhythm.      Heart sounds: Normal heart sounds.   Pulmonary:      Effort: Pulmonary effort is normal.      Breath sounds: Normal breath sounds.   Abdominal: "      Palpations: Abdomen is soft.   Musculoskeletal:         General: Normal range of motion.      Cervical back: Normal range of motion and neck supple.   Skin:     General: Skin is warm and dry.   Neurological:      General: No focal deficit present.      Mental Status: She is alert and oriented to person, place, and time.   Psychiatric:         Mood and Affect: Mood normal.         Behavior: Behavior normal.          PAT AIRWAY:   Airway:     Mallampati::  II    Neck ROM::  Full   implants    Lab Results   Component Value Date    WBC 8.8 05/10/2024    HGB 13.5 05/10/2024    HCT 40.5 05/10/2024    MCV 97 05/10/2024     05/10/2024      Lab Results   Component Value Date    GLUCOSE 93 05/10/2024    CALCIUM 10.2 05/10/2024     05/10/2024    K 3.9 05/10/2024    CO2 28 05/10/2024     05/10/2024    BUN 18 05/10/2024    CREATININE 0.91 05/10/2024      Protime  9.8 - 12.8 seconds 10.8   INR  0.9 - 1.1 1.0   aPTT  27 - 38 seconds 35     Lab Results   Component Value Date    HGBA1C 5.3 05/10/2024      EKG 5/10/24  NSR  Possible left  atrial enlargement  Low voltage QRS  Septal infarct, age undetermined  62 BPM     RCRI  1  , 6 % Risk of MACE    Cardiac  HTN - lisinopril HOLD 24 hours prior to surgery   HLD - continue atorvastatin DOS      Endocrinology  Hypothyroid - continue levothyroxine DOS      Hematology       Patient instructed to ambulate as soon as possible postoperatively to decrease thromboembolic risk.      Initiate mechanical DVT prophylaxis as soon as possible and initiate chemical prophylaxis when deemed safe from a bleeding standpoint post surgery.       VTE prophylaxis per surgical team       Tests ordered in PAT: cbc, bmp, coag, A1c, t&s, ua, mrsa,EKG    LABS REVIEWED: unremarkable     Risk assessment complete.  Patient is scheduled for a intermediate/high surgical risk procedure.        Preoperative medication instructions were provided and reviewed with the patient.  Any additional  testing or evaluation was explained to the patient.  Nothing by mouth instructions were discussed and patient's questions were answered prior to conclusion to this encounter.  Patient verbalized understanding of preoperative instructions given in preadmission testing; discharge instructions available in EMR.    This note was dictated by a speech recognition.  Minor errors may have been detected in a speech recognition.

## 2024-05-10 NOTE — H&P (VIEW-ONLY)
[facility]/PAT Evaluation       Name: July Castillo (July Castillo)  /Age: 1950/74 y.o.     In-Person       Chief Complaint: Lumbosacral spondylosis with radiculopathy / Acquired spondylolisthesis of lumbosacral region     HPI    Date of Consult: 05/10/24    Referring Provider: Dr. Wright    Surgery, Date, and Length: L3-L5 Transforaminal Lumbar Interbody Fusion; Bilateral Lumbar Laminectomies; Right Side Facetectomies - Bilateral ; 24; 240 minutes     July Castillo  is a 74 year-old female who presents to the [facility] for perioperative risk assessment prior to surgery. Patient has history of chronic low back pain that started almost 10 years ago.  The pain goes down both legs posterior lateral thigh more in the left leg and more anterior thigh with numbness and tingling in the right leg.  Her leg pain bothers her more than her low back pain.  Bending forward to tie her shoes for example is extremely painful.  She has had injections by pain management for her low back symptoms over the years both here in [place] and in [place] with temporary relief.  She did aggressive land and aquatic physical therapy in [place] about 2 years ago and continues to do home exercises, but starting in January her pain got so severe and her left leg feels weak that she is not able to do the exercises as well.  She takes Tylenol for her pain as well and is allergic to NSAIDs.      This note was created in part upon personal review of patient's medical records.      Patient is scheduled to have L3-L5 Transforaminal Lumbar Interbody Fusion; Bilateral Lumbar Laminectomies; Right Side Facetectomies - Bilateral     Medical History  Past Medical History:   Diagnosis Date    Arthritis     Hyperlipidemia     Hypertension 2010    Hypothyroidism     Lumbosacral spondylosis with radiculopathy     Osteopenia     Vertigo     remote history        STOP BANG =   2   ( >49 yo, htn)    Caprini =   6  (age,surgery, BMI>25)    Surgical  History  Past Surgical History:   Procedure Laterality Date    COLONOSCOPY  06/22/2022    TUMOR EXCISION  2005    Pleomorphic adenoma of parotid    WISDOM TOOTH EXTRACTION  1980             Family history:  Family History   Problem Relation Name Age of Onset    Arthritis Mother Aurora     Hypertension Mother Aurora     Kidney disease Mother Aurora     Heart disease Father Tate     Hypertension Father Tate     Stroke Father Tate     Breast cancer Sister  68        Social history:  Social History     Socioeconomic History    Marital status:      Spouse name: Not on file    Number of children: Not on file    Years of education: Not on file    Highest education level: Not on file   Occupational History    Not on file   Tobacco Use    Smoking status: Never    Smokeless tobacco: Never   Vaping Use    Vaping status: Never Used   Substance and Sexual Activity    Alcohol use: Yes     Alcohol/week: 3.0 standard drinks of alcohol     Types: 3 Glasses of wine per week    Drug use: Never    Sexual activity: Not Currently     Birth control/protection: None   Other Topics Concern    Not on file   Social History Narrative    Not on file     Social Determinants of Health     Financial Resource Strain: Low Risk  (6/12/2022)    Received from [facility]    Overall Financial Resource Strain (CARDIA)     Difficulty of Paying Living Expenses: Not hard at all   Food Insecurity: No Food Insecurity (6/12/2022)    Received from [facility]    Hunger Vital Sign     Worried About Running Out of Food in the Last Year: Never true     Ran Out of Food in the Last Year: Never true   Transportation Needs: No Transportation Needs (6/12/2022)    Received from [facility]    PRAPARE - Transportation     Lack of Transportation (Medical): No     Lack of Transportation (Non-Medical): No   Physical Activity: Sufficiently Active (6/12/2022)    Received from [facility]    Exercise Vital Sign     Days of Exercise per Week: 6 days     Minutes of Exercise  per Session: 60 min   Stress: No Stress Concern Present (6/12/2022)    Received from [facility]    [employer] [place] [employer] - Occupational Stress Questionnaire     Feeling of Stress : Only a little   Social Connections: Moderately Isolated (6/12/2022)    Received from [facility]    Social Connection and Isolation Panel [NHANES]     Frequency of Communication with Friends and Family: Three times a week     Frequency of Social Gatherings with Friends and Family: Twice a week     Attends Jehovah's witness Services: Never     Active Member of Clubs or Organizations: Yes     Attends Club or Organization Meetings: 1 to 4 times per year     Marital Status:    Intimate Partner Violence: Not At Risk (6/12/2022)    Received from [facility]    Humiliation, Afraid, Rape, and Kick questionnaire     Fear of Current or Ex-Partner: No     Emotionally Abused: No     Physically Abused: No     Sexually Abused: No   Housing Stability: Low Risk  (6/12/2022)    Received from [facility]    Housing Stability Vital Sign     Unable to Pay for Housing in the Last Year: No     Number of Places Lived in the Last Year: 1     Unstable Housing in the Last Year: No          Current Outpatient Medications:     acetaminophen (Tylenol) 500 mg tablet, Take 2 tablets (1,000 mg) by mouth every 6 hours if needed for mild pain (1 - 3)., Disp: , Rfl:     ascorbic acid/collagen hydr (COLLAGEN SKIN RENEWAL ORAL), Take 1 Dose by mouth once daily. gummy, Disp: , Rfl:     atorvastatin (Lipitor) 20 mg tablet, Take 1 tablet (20 mg) by mouth once daily., Disp: , Rfl:     calcium carbonate-vitamin D3 (Calcium 600 with Vitamin D3) 600 mg-10 mcg (400 unit) chewable tablet, Chew 1 tablet once daily., Disp: , Rfl:     diphenhydrAMINE-acetaminophen (Tylenol PM)  mg per tablet, Take 1 tablet by mouth as needed at bedtime for sleep., Disp: , Rfl:     docusate sodium (Colace) 100 mg capsule, Take 1 capsule (100 mg) by mouth once daily at bedtime.,  "Disp: , Rfl:     levothyroxine (Synthroid, Levoxyl) 88 mcg tablet, Take 1 tablet (88 mcg) by mouth once daily., Disp: 90 tablet, Rfl: 3    lisinopril 10 mg tablet, Take 1 tablet (10 mg) by mouth once daily., Disp: , Rfl:     chlorhexidine (Peridex) 0.12 % solution, 15 ml swish and spit for 30 seconds night prior to surgery and morning of surgery, Disp: 473 mL, Rfl: 0    clindamycin (Cleocin T) 1 % external solution, Apply topically 2 times a day. (Patient not taking: Reported on 5/10/2024), Disp: 1 mL, Rfl: 11     Visit Vitals  /70   Pulse 71   Temp 36.6 °C (97.9 °F)   Resp 16   Ht 1.58 m (5' 2.21\")   Wt 65 kg (143 lb 4.8 oz)   LMP  (LMP Unknown)   SpO2 100%   BMI 26.04 kg/m²   OB Status Postmenopausal   Smoking Status Never   BSA 1.69 m²      Review of Systems   Constitutional: Negative.    HENT: Negative.     Eyes: Negative.         Glasses   Respiratory: Negative.     Cardiovascular: Negative.         METS  4   walking / swimming - limited only by back presently   Gastrointestinal: Negative.    Endocrine: Negative.    Genitourinary: Negative.    Musculoskeletal:  Positive for back pain.   Skin: Negative.    Allergic/Immunologic: Negative.    Neurological: Negative.    Hematological: Negative.    Psychiatric/Behavioral: Negative.          Physical Exam  Vitals reviewed.   Constitutional:       Appearance: Normal appearance.   HENT:      Head: Normocephalic and atraumatic.      Right Ear: External ear normal.      Left Ear: External ear normal.      Nose: Nose normal.      Mouth/Throat:      Pharynx: Oropharynx is clear.   Eyes:      Extraocular Movements: Extraocular movements intact.      Conjunctiva/sclera: Conjunctivae normal.      Pupils: Pupils are equal, round, and reactive to light.   Cardiovascular:      Rate and Rhythm: Normal rate and regular rhythm.      Heart sounds: Normal heart sounds.   Pulmonary:      Effort: Pulmonary effort is normal.      Breath sounds: Normal breath sounds.   Abdominal: "      Palpations: Abdomen is soft.   Musculoskeletal:         General: Normal range of motion.      Cervical back: Normal range of motion and neck supple.   Skin:     General: Skin is warm and dry.   Neurological:      General: No focal deficit present.      Mental Status: She is alert and oriented to person, place, and time.   Psychiatric:         Mood and Affect: Mood normal.         Behavior: Behavior normal.          PAT AIRWAY:   Airway:     Mallampati::  II    Neck ROM::  Full   implants    Lab Results   Component Value Date    WBC 8.8 05/10/2024    HGB 13.5 05/10/2024    HCT 40.5 05/10/2024    MCV 97 05/10/2024     05/10/2024      Lab Results   Component Value Date    GLUCOSE 93 05/10/2024    CALCIUM 10.2 05/10/2024     05/10/2024    K 3.9 05/10/2024    CO2 28 05/10/2024     05/10/2024    BUN 18 05/10/2024    CREATININE 0.91 05/10/2024      Protime  9.8 - 12.8 seconds 10.8   INR  0.9 - 1.1 1.0   aPTT  27 - 38 seconds 35     Lab Results   Component Value Date    HGBA1C 5.3 05/10/2024      EKG 5/10/24  NSR  Possible left  atrial enlargement  Low voltage QRS  Septal infarct, age undetermined  62 BPM     RCRI  1  , 6 % Risk of MACE    Cardiac  HTN - lisinopril HOLD 24 hours prior to surgery   HLD - continue atorvastatin DOS      Endocrinology  Hypothyroid - continue levothyroxine DOS      Hematology       Patient instructed to ambulate as soon as possible postoperatively to decrease thromboembolic risk.      Initiate mechanical DVT prophylaxis as soon as possible and initiate chemical prophylaxis when deemed safe from a bleeding standpoint post surgery.       VTE prophylaxis per surgical team       Tests ordered in PAT: cbc, bmp, coag, A1c, t&s, ua, mrsa,EKG    LABS REVIEWED: unremarkable     Risk assessment complete.  Patient is scheduled for a intermediate/high surgical risk procedure.        Preoperative medication instructions were provided and reviewed with the patient.  Any additional  testing or evaluation was explained to the patient.  Nothing by mouth instructions were discussed and patient's questions were answered prior to conclusion to this encounter.  Patient verbalized understanding of preoperative instructions given in preadmission testing; discharge instructions available in EMR.    This note was dictated by a speech recognition.  Minor errors may have been detected in a speech recognition.

## 2024-05-10 NOTE — PREPROCEDURE INSTRUCTIONS
Medication List            Accurate as of May 10, 2024 10:38 AM. Always use your most recent med list.                acetaminophen 500 mg tablet  Commonly known as: Tylenol  Notes to patient: Use if needed morning of surgery     atorvastatin 20 mg tablet  Commonly known as: Lipitor  Medication Adjustments for Surgery: Take morning of surgery with sip of water, no other fluids     Calcium 600 with Vitamin D3 600 mg-10 mcg (400 unit) chewable tablet  Generic drug: calcium carbonate-vitamin D3  Medication Adjustments for Surgery: Continue until night before surgery     clindamycin 1 % external solution  Commonly known as: Cleocin T  Apply topically 2 times a day.  Notes to patient: Patient states not taking this medication      COLLAGEN SKIN RENEWAL ORAL  Medication Adjustments for Surgery: Stop 7 days before surgery     diphenhydrAMINE-acetaminophen  mg per tablet  Commonly known as: Tylenol PM  Notes to patient: Continue night prior to surgery if needed     docusate sodium 100 mg capsule  Commonly known as: Colace  Medication Adjustments for Surgery: Continue until night before surgery     levothyroxine 88 mcg tablet  Commonly known as: Synthroid, Levoxyl  Take 1 tablet (88 mcg) by mouth once daily.  Medication Adjustments for Surgery: Take morning of surgery with sip of water, no other fluids     lisinopril 10 mg tablet  Notes to patient: HOLD 24 hours prior to surgery                    Concerning above medication instructions - If medication is normally taken at night continue normal schedule - do not take night prior and morning of surgery.     CONTACT SURGEON'S OFFICE IF YOU DEVELOP:  * Fever = 100.4 F   * New respiratory symptoms (e.g. cough, shortness of breath, respiratory distress, sore throat)  * Recent loss of taste or smell  *Flu like symptoms such as headache, fatigue or gastrointestinal symptoms  * You develop any open sores, shingles, burning or painful urination   AND/OR:  * You no longer  wish to have the surgery.  * Any other personal circumstances change that may lead to the need to cancel or defer this surgery.  *You were admitted to any hospital within one week of your planned procedure.    SMOKING:  *Quitting smoking can make a huge difference to your health and recovery from surgery.    *If you need help with quitting, call 1-933-QUIT-NOW.    THE DAY BEFORE SURGERY:  *Do not eat any food after midnight the night before surgery/procedure.   *You are permitted to drink clear liquids (i.e. water, black coffee/tea, (no milk or cream) apple juice, and electrolyte drinks (Gatorade)13.5 ounces up to 2 hours before your instructed arrival time to the hospital.  *You may chew gum until  2 hours before your surgery/procedure.    SURGICAL TIME  *You will be contacted between 2 p.m. and 6 p.m. the business day before your surgery with your arrival time.  *If you haven't received a call by 6pm, call [phone].  *Scheduled surgery times may change and you will be notified if this occurs-check your personal voicemail for any updates.    ON THE MORNING OF SURGERY:  *Wear comfortable, loose fitting clothing.   *Do not use moisturizers, creams, lotions or perfume.  *All jewelry and valuables should be left at home.  *Prosthetic devices such as contact lenses, hearing aids, dentures, eyelash extensions, hairpins and body piercing must be removed before surgery.    BRING WITH YOU:  *Photo ID and insurance card  *Current list of medicines and allergies  *Pacemaker/Defibrillator/Heart stent cards  *CPAP machine and mask  *Slings/splints/crutches  *Copy of your complete Advanced Directive/DHPOA-if applicable  *Neurostimulator implant remote    PARKING AND ARRIVAL:  *Check in at the Main Entrance desk and let them know you are here for surgery.  *You will be directed to the 2nd floor surgical waiting area.    AFTER OUTPATIENT SURGERY:  *A responsible adult MUST accompany you at the time of discharge and stay with  you for 24 hours after your surgery.  *You may NOT drive yourself home after surgery.  *You may use a taxi or ride sharing service ([employer], Uber) to return home ONLY if you are accompanied by a friend or family member.  *Instructions for resuming your medications will be provided by your surgeon.

## 2024-05-12 LAB — STAPHYLOCOCCUS SPEC CULT: NORMAL

## 2024-05-16 ENCOUNTER — APPOINTMENT (OUTPATIENT)
Dept: OTOLARYNGOLOGY | Facility: CLINIC | Age: 74
End: 2024-05-16
Payer: MEDICARE

## 2024-05-19 DIAGNOSIS — E78.5 HYPERLIPIDEMIA, UNSPECIFIED HYPERLIPIDEMIA TYPE: ICD-10-CM

## 2024-05-20 RX ORDER — ATORVASTATIN CALCIUM 20 MG/1
20 TABLET, FILM COATED ORAL DAILY
Qty: 180 TABLET | Refills: 0 | Status: SHIPPED | OUTPATIENT
Start: 2024-05-20

## 2024-05-23 ENCOUNTER — ANESTHESIA EVENT (OUTPATIENT)
Dept: OPERATING ROOM | Facility: HOSPITAL | Age: 74
DRG: 455 | End: 2024-05-23
Payer: MEDICARE

## 2024-05-24 ENCOUNTER — ANESTHESIA (OUTPATIENT)
Dept: OPERATING ROOM | Facility: HOSPITAL | Age: 74
DRG: 455 | End: 2024-05-24
Payer: MEDICARE

## 2024-05-24 ENCOUNTER — APPOINTMENT (OUTPATIENT)
Dept: RADIOLOGY | Facility: HOSPITAL | Age: 74
DRG: 455 | End: 2024-05-24
Payer: MEDICARE

## 2024-05-24 ENCOUNTER — HOSPITAL ENCOUNTER (INPATIENT)
Facility: HOSPITAL | Age: 74
LOS: 3 days | Discharge: HOSPICE | DRG: 455 | End: 2024-05-27
Attending: NEUROLOGICAL SURGERY | Admitting: NEUROLOGICAL SURGERY
Payer: MEDICARE

## 2024-05-24 DIAGNOSIS — M47.27 LUMBOSACRAL SPONDYLOSIS WITH RADICULOPATHY: Primary | ICD-10-CM

## 2024-05-24 DIAGNOSIS — M43.00 SPONDYLOLYSIS: ICD-10-CM

## 2024-05-24 PROBLEM — E78.5 HYPERLIPIDEMIA: Status: ACTIVE | Noted: 2024-05-24

## 2024-05-24 PROBLEM — I10 HTN (HYPERTENSION): Status: ACTIVE | Noted: 2024-05-24

## 2024-05-24 PROBLEM — E03.9 HYPOTHYROIDISM: Status: ACTIVE | Noted: 2024-05-24

## 2024-05-24 LAB
ABO GROUP (TYPE) IN BLOOD: NORMAL
RH FACTOR (ANTIGEN D): NORMAL

## 2024-05-24 PROCEDURE — 36415 COLL VENOUS BLD VENIPUNCTURE: CPT | Performed by: NEUROLOGICAL SURGERY

## 2024-05-24 PROCEDURE — 2500000005 HC RX 250 GENERAL PHARMACY W/O HCPCS: Performed by: NEUROLOGICAL SURGERY

## 2024-05-24 PROCEDURE — 2500000005 HC RX 250 GENERAL PHARMACY W/O HCPCS: Performed by: NURSE ANESTHETIST, CERTIFIED REGISTERED

## 2024-05-24 PROCEDURE — 7100000001 HC RECOVERY ROOM TIME - INITIAL BASE CHARGE: Performed by: NEUROLOGICAL SURGERY

## 2024-05-24 PROCEDURE — 1100000001 HC PRIVATE ROOM DAILY

## 2024-05-24 PROCEDURE — 97530 THERAPEUTIC ACTIVITIES: CPT | Mod: GP

## 2024-05-24 PROCEDURE — 76000 FLUOROSCOPY <1 HR PHYS/QHP: CPT

## 2024-05-24 PROCEDURE — 3600000018 HC OR TIME - INITIAL BASE CHARGE - PROCEDURE LEVEL SIX: Performed by: NEUROLOGICAL SURGERY

## 2024-05-24 PROCEDURE — 63053 LAM FACTC/FRMT ARTHRD LUM EA: CPT | Performed by: NEUROLOGICAL SURGERY

## 2024-05-24 PROCEDURE — 22633 ARTHRD CMBN 1NTRSPC LUMBAR: CPT | Performed by: NEUROLOGICAL SURGERY

## 2024-05-24 PROCEDURE — 2500000004 HC RX 250 GENERAL PHARMACY W/ HCPCS (ALT 636 FOR OP/ED): Performed by: STUDENT IN AN ORGANIZED HEALTH CARE EDUCATION/TRAINING PROGRAM

## 2024-05-24 PROCEDURE — 63052 LAM FACETC/FRMT ARTHRD LUM 1: CPT | Performed by: NEUROLOGICAL SURGERY

## 2024-05-24 PROCEDURE — C1713 ANCHOR/SCREW BN/BN,TIS/BN: HCPCS | Performed by: NEUROLOGICAL SURGERY

## 2024-05-24 PROCEDURE — 22842 INSERT SPINE FIXATION DEVICE: CPT | Performed by: NEUROLOGICAL SURGERY

## 2024-05-24 PROCEDURE — 0SG1071 FUSION OF 2 OR MORE LUMBAR VERTEBRAL JOINTS WITH AUTOLOGOUS TISSUE SUBSTITUTE, POSTERIOR APPROACH, POSTERIOR COLUMN, OPEN APPROACH: ICD-10-PCS | Performed by: NEUROLOGICAL SURGERY

## 2024-05-24 PROCEDURE — 2500000005 HC RX 250 GENERAL PHARMACY W/O HCPCS: Performed by: STUDENT IN AN ORGANIZED HEALTH CARE EDUCATION/TRAINING PROGRAM

## 2024-05-24 PROCEDURE — 22634 ARTHRD CMBN 1NTRSPC EA ADDL: CPT | Performed by: NEUROLOGICAL SURGERY

## 2024-05-24 PROCEDURE — 3700000002 HC GENERAL ANESTHESIA TIME - EACH INCREMENTAL 1 MINUTE: Performed by: NEUROLOGICAL SURGERY

## 2024-05-24 PROCEDURE — 2780000003 HC OR 278 NO HCPCS: Performed by: NEUROLOGICAL SURGERY

## 2024-05-24 PROCEDURE — 2500000004 HC RX 250 GENERAL PHARMACY W/ HCPCS (ALT 636 FOR OP/ED): Performed by: NURSE ANESTHETIST, CERTIFIED REGISTERED

## 2024-05-24 PROCEDURE — A22633 PR ARTHDSIS POST/POSTEROLATRL/POSTINTERBODY LUMBAR: Performed by: STUDENT IN AN ORGANIZED HEALTH CARE EDUCATION/TRAINING PROGRAM

## 2024-05-24 PROCEDURE — 0SG10AJ FUSION OF 2 OR MORE LUMBAR VERTEBRAL JOINTS WITH INTERBODY FUSION DEVICE, POSTERIOR APPROACH, ANTERIOR COLUMN, OPEN APPROACH: ICD-10-PCS | Performed by: NEUROLOGICAL SURGERY

## 2024-05-24 PROCEDURE — 99100 ANES PT EXTEME AGE<1 YR&>70: CPT | Performed by: STUDENT IN AN ORGANIZED HEALTH CARE EDUCATION/TRAINING PROGRAM

## 2024-05-24 PROCEDURE — 2720000007 HC OR 272 NO HCPCS: Performed by: NEUROLOGICAL SURGERY

## 2024-05-24 PROCEDURE — C1889 IMPLANT/INSERT DEVICE, NOC: HCPCS | Performed by: NEUROLOGICAL SURGERY

## 2024-05-24 PROCEDURE — 3700000001 HC GENERAL ANESTHESIA TIME - INITIAL BASE CHARGE: Performed by: NEUROLOGICAL SURGERY

## 2024-05-24 PROCEDURE — 2500000001 HC RX 250 WO HCPCS SELF ADMINISTERED DRUGS (ALT 637 FOR MEDICARE OP): Performed by: STUDENT IN AN ORGANIZED HEALTH CARE EDUCATION/TRAINING PROGRAM

## 2024-05-24 PROCEDURE — A22633 PR ARTHDSIS POST/POSTEROLATRL/POSTINTERBODY LUMBAR: Performed by: NURSE ANESTHETIST, CERTIFIED REGISTERED

## 2024-05-24 PROCEDURE — 3600000017 HC OR TIME - EACH INCREMENTAL 1 MINUTE - PROCEDURE LEVEL SIX: Performed by: NEUROLOGICAL SURGERY

## 2024-05-24 PROCEDURE — 0ST20ZZ RESECTION OF LUMBAR VERTEBRAL DISC, OPEN APPROACH: ICD-10-PCS | Performed by: NEUROLOGICAL SURGERY

## 2024-05-24 PROCEDURE — 22853 INSJ BIOMECHANICAL DEVICE: CPT | Performed by: NEUROLOGICAL SURGERY

## 2024-05-24 PROCEDURE — 01NB0ZZ RELEASE LUMBAR NERVE, OPEN APPROACH: ICD-10-PCS | Performed by: NEUROLOGICAL SURGERY

## 2024-05-24 PROCEDURE — 7100000002 HC RECOVERY ROOM TIME - EACH INCREMENTAL 1 MINUTE: Performed by: NEUROLOGICAL SURGERY

## 2024-05-24 PROCEDURE — 97161 PT EVAL LOW COMPLEX 20 MIN: CPT | Mod: GP

## 2024-05-24 PROCEDURE — 97112 NEUROMUSCULAR REEDUCATION: CPT | Mod: GP

## 2024-05-24 DEVICE — MODULUS TLIF-O, 8X10X25MM 8°
Type: IMPLANTABLE DEVICE | Site: SPINE LUMBAR | Status: FUNCTIONAL
Brand: MODULUS

## 2024-05-24 DEVICE — BONE MATRIX, OSTEOCEL PRO CELLULAR, MEDIUM: Type: IMPLANTABLE DEVICE | Site: SPINE LUMBAR | Status: FUNCTIONAL

## 2024-05-24 DEVICE — SCREW, RELINE LOCK, 5.5MM OPEN TULIP: Type: IMPLANTABLE DEVICE | Site: SPINE LUMBAR | Status: FUNCTIONAL

## 2024-05-24 DEVICE — ROD, RELINE-O, 5.5 X 60MM, LORDOTIC: Type: IMPLANTABLE DEVICE | Site: SPINE LUMBAR | Status: FUNCTIONAL

## 2024-05-24 DEVICE — SCREW, RELINE-O, 6.5X45MM 2S POLYAXIAL: Type: IMPLANTABLE DEVICE | Site: SPINE LUMBAR | Status: FUNCTIONAL

## 2024-05-24 RX ORDER — LIDOCAINE HYDROCHLORIDE 10 MG/ML
0.1 INJECTION, SOLUTION EPIDURAL; INFILTRATION; INTRACAUDAL; PERINEURAL ONCE
Status: DISCONTINUED | OUTPATIENT
Start: 2024-05-24 | End: 2024-05-24 | Stop reason: HOSPADM

## 2024-05-24 RX ORDER — POLYETHYLENE GLYCOL 3350 17 G/17G
17 POWDER, FOR SOLUTION ORAL DAILY
Status: DISCONTINUED | OUTPATIENT
Start: 2024-05-24 | End: 2024-05-27 | Stop reason: HOSPADM

## 2024-05-24 RX ORDER — LIDOCAINE 560 MG/1
2 PATCH PERCUTANEOUS; TOPICAL; TRANSDERMAL DAILY
Status: DISCONTINUED | OUTPATIENT
Start: 2024-05-24 | End: 2024-05-27 | Stop reason: HOSPADM

## 2024-05-24 RX ORDER — DEXMEDETOMIDINE IN 0.9 % NACL 20 MCG/5ML
SYRINGE (ML) INTRAVENOUS AS NEEDED
Status: DISCONTINUED | OUTPATIENT
Start: 2024-05-24 | End: 2024-05-24

## 2024-05-24 RX ORDER — LIDOCAINE HYDROCHLORIDE 20 MG/ML
INJECTION, SOLUTION INFILTRATION; PERINEURAL AS NEEDED
Status: DISCONTINUED | OUTPATIENT
Start: 2024-05-24 | End: 2024-05-24

## 2024-05-24 RX ORDER — NORETHINDRONE AND ETHINYL ESTRADIOL 0.5-0.035
KIT ORAL AS NEEDED
Status: DISCONTINUED | OUTPATIENT
Start: 2024-05-24 | End: 2024-05-24

## 2024-05-24 RX ORDER — ATORVASTATIN CALCIUM 20 MG/1
20 TABLET, FILM COATED ORAL DAILY
Status: DISCONTINUED | OUTPATIENT
Start: 2024-05-24 | End: 2024-05-27 | Stop reason: HOSPADM

## 2024-05-24 RX ORDER — CEFAZOLIN 1 G/1
INJECTION, POWDER, FOR SOLUTION INTRAVENOUS AS NEEDED
Status: DISCONTINUED | OUTPATIENT
Start: 2024-05-24 | End: 2024-05-24

## 2024-05-24 RX ORDER — MIDAZOLAM HYDROCHLORIDE 1 MG/ML
INJECTION INTRAMUSCULAR; INTRAVENOUS AS NEEDED
Status: DISCONTINUED | OUTPATIENT
Start: 2024-05-24 | End: 2024-05-24

## 2024-05-24 RX ORDER — METHOCARBAMOL 100 MG/ML
INJECTION, SOLUTION INTRAMUSCULAR; INTRAVENOUS AS NEEDED
Status: DISCONTINUED | OUTPATIENT
Start: 2024-05-24 | End: 2024-05-24

## 2024-05-24 RX ORDER — ALBUTEROL SULFATE 0.83 MG/ML
2.5 SOLUTION RESPIRATORY (INHALATION) EVERY 4 HOURS PRN
Status: DISCONTINUED | OUTPATIENT
Start: 2024-05-24 | End: 2024-05-24 | Stop reason: HOSPADM

## 2024-05-24 RX ORDER — FENTANYL CITRATE 50 UG/ML
INJECTION, SOLUTION INTRAMUSCULAR; INTRAVENOUS AS NEEDED
Status: DISCONTINUED | OUTPATIENT
Start: 2024-05-24 | End: 2024-05-24

## 2024-05-24 RX ORDER — DEXTROSE 50 % IN WATER (D50W) INTRAVENOUS SYRINGE
12.5
Status: DISCONTINUED | OUTPATIENT
Start: 2024-05-24 | End: 2024-05-27 | Stop reason: HOSPADM

## 2024-05-24 RX ORDER — PROPOFOL 10 MG/ML
INJECTION, EMULSION INTRAVENOUS AS NEEDED
Status: DISCONTINUED | OUTPATIENT
Start: 2024-05-24 | End: 2024-05-24

## 2024-05-24 RX ORDER — OXYCODONE HYDROCHLORIDE 5 MG/1
10 TABLET ORAL EVERY 4 HOURS PRN
Status: DISCONTINUED | OUTPATIENT
Start: 2024-05-24 | End: 2024-05-24 | Stop reason: HOSPADM

## 2024-05-24 RX ORDER — OXYCODONE HYDROCHLORIDE 5 MG/1
5 TABLET ORAL EVERY 4 HOURS PRN
Status: DISCONTINUED | OUTPATIENT
Start: 2024-05-24 | End: 2024-05-27 | Stop reason: HOSPADM

## 2024-05-24 RX ORDER — ROCURONIUM BROMIDE 10 MG/ML
INJECTION, SOLUTION INTRAVENOUS AS NEEDED
Status: DISCONTINUED | OUTPATIENT
Start: 2024-05-24 | End: 2024-05-24

## 2024-05-24 RX ORDER — OXYCODONE HYDROCHLORIDE 5 MG/1
10 TABLET ORAL EVERY 4 HOURS PRN
Status: DISCONTINUED | OUTPATIENT
Start: 2024-05-24 | End: 2024-05-27 | Stop reason: HOSPADM

## 2024-05-24 RX ORDER — DEXTROSE 50 % IN WATER (D50W) INTRAVENOUS SYRINGE
25
Status: DISCONTINUED | OUTPATIENT
Start: 2024-05-24 | End: 2024-05-27 | Stop reason: HOSPADM

## 2024-05-24 RX ORDER — ONDANSETRON HYDROCHLORIDE 2 MG/ML
INJECTION, SOLUTION INTRAVENOUS AS NEEDED
Status: DISCONTINUED | OUTPATIENT
Start: 2024-05-24 | End: 2024-05-24

## 2024-05-24 RX ORDER — LEVOTHYROXINE SODIUM 88 UG/1
88 TABLET ORAL
Status: DISCONTINUED | OUTPATIENT
Start: 2024-05-25 | End: 2024-05-27 | Stop reason: HOSPADM

## 2024-05-24 RX ORDER — CYCLOBENZAPRINE HCL 10 MG
10 TABLET ORAL 3 TIMES DAILY
Status: DISCONTINUED | OUTPATIENT
Start: 2024-05-24 | End: 2024-05-27 | Stop reason: HOSPADM

## 2024-05-24 RX ORDER — ACETAMINOPHEN 325 MG/1
650 TABLET ORAL EVERY 6 HOURS
Status: DISCONTINUED | OUTPATIENT
Start: 2024-05-24 | End: 2024-05-27 | Stop reason: HOSPADM

## 2024-05-24 RX ORDER — HYDROMORPHONE HYDROCHLORIDE 1 MG/ML
INJECTION, SOLUTION INTRAMUSCULAR; INTRAVENOUS; SUBCUTANEOUS AS NEEDED
Status: DISCONTINUED | OUTPATIENT
Start: 2024-05-24 | End: 2024-05-24

## 2024-05-24 RX ORDER — SODIUM CHLORIDE, SODIUM LACTATE, POTASSIUM CHLORIDE, CALCIUM CHLORIDE 600; 310; 30; 20 MG/100ML; MG/100ML; MG/100ML; MG/100ML
100 INJECTION, SOLUTION INTRAVENOUS CONTINUOUS
Status: DISCONTINUED | OUTPATIENT
Start: 2024-05-24 | End: 2024-05-24 | Stop reason: HOSPADM

## 2024-05-24 RX ORDER — LISINOPRIL 10 MG/1
10 TABLET ORAL DAILY
Status: DISCONTINUED | OUTPATIENT
Start: 2024-05-25 | End: 2024-05-27 | Stop reason: HOSPADM

## 2024-05-24 RX ORDER — METHOCARBAMOL 100 MG/ML
1000 INJECTION, SOLUTION INTRAMUSCULAR; INTRAVENOUS ONCE
Status: DISCONTINUED | OUTPATIENT
Start: 2024-05-24 | End: 2024-05-24 | Stop reason: HOSPADM

## 2024-05-24 RX ORDER — SODIUM CHLORIDE, SODIUM LACTATE, POTASSIUM CHLORIDE, CALCIUM CHLORIDE 600; 310; 30; 20 MG/100ML; MG/100ML; MG/100ML; MG/100ML
100 INJECTION, SOLUTION INTRAVENOUS CONTINUOUS
Status: DISCONTINUED | OUTPATIENT
Start: 2024-05-24 | End: 2024-05-26

## 2024-05-24 RX ORDER — ONDANSETRON HYDROCHLORIDE 2 MG/ML
4 INJECTION, SOLUTION INTRAVENOUS EVERY 8 HOURS PRN
Status: DISCONTINUED | OUTPATIENT
Start: 2024-05-24 | End: 2024-05-27 | Stop reason: HOSPADM

## 2024-05-24 RX ORDER — ONDANSETRON HYDROCHLORIDE 2 MG/ML
4 INJECTION, SOLUTION INTRAVENOUS ONCE AS NEEDED
Status: DISCONTINUED | OUTPATIENT
Start: 2024-05-24 | End: 2024-05-24 | Stop reason: HOSPADM

## 2024-05-24 RX ORDER — BUPIVACAINE HYDROCHLORIDE 5 MG/ML
INJECTION, SOLUTION PERINEURAL AS NEEDED
Status: DISCONTINUED | OUTPATIENT
Start: 2024-05-24 | End: 2024-05-24 | Stop reason: HOSPADM

## 2024-05-24 RX ORDER — HEPARIN SODIUM 5000 [USP'U]/ML
5000 INJECTION, SOLUTION INTRAVENOUS; SUBCUTANEOUS EVERY 8 HOURS
Status: DISCONTINUED | OUTPATIENT
Start: 2024-05-25 | End: 2024-05-27 | Stop reason: HOSPADM

## 2024-05-24 RX ORDER — ACETAMINOPHEN 325 MG/1
650 TABLET ORAL EVERY 4 HOURS PRN
Status: DISCONTINUED | OUTPATIENT
Start: 2024-05-24 | End: 2024-05-24 | Stop reason: HOSPADM

## 2024-05-24 RX ORDER — ONDANSETRON 4 MG/1
4 TABLET, FILM COATED ORAL EVERY 8 HOURS PRN
Status: DISCONTINUED | OUTPATIENT
Start: 2024-05-24 | End: 2024-05-27 | Stop reason: HOSPADM

## 2024-05-24 RX ORDER — HYDRALAZINE HYDROCHLORIDE 20 MG/ML
5 INJECTION INTRAMUSCULAR; INTRAVENOUS EVERY 30 MIN PRN
Status: DISCONTINUED | OUTPATIENT
Start: 2024-05-24 | End: 2024-05-24 | Stop reason: HOSPADM

## 2024-05-24 RX ORDER — OXYCODONE HYDROCHLORIDE 5 MG/1
5 TABLET ORAL EVERY 4 HOURS PRN
Status: DISCONTINUED | OUTPATIENT
Start: 2024-05-24 | End: 2024-05-24 | Stop reason: HOSPADM

## 2024-05-24 RX ORDER — NALOXONE HYDROCHLORIDE 0.4 MG/ML
0.2 INJECTION, SOLUTION INTRAMUSCULAR; INTRAVENOUS; SUBCUTANEOUS EVERY 5 MIN PRN
Status: DISCONTINUED | OUTPATIENT
Start: 2024-05-24 | End: 2024-05-27 | Stop reason: HOSPADM

## 2024-05-24 RX ADMIN — SUGAMMADEX 200 MG: 100 INJECTION, SOLUTION INTRAVENOUS at 13:10

## 2024-05-24 RX ADMIN — SODIUM CHLORIDE, POTASSIUM CHLORIDE, SODIUM LACTATE AND CALCIUM CHLORIDE 100 ML/HR: 600; 310; 30; 20 INJECTION, SOLUTION INTRAVENOUS at 07:07

## 2024-05-24 RX ADMIN — POLYETHYLENE GLYCOL 3350 17 G: 17 POWDER, FOR SOLUTION ORAL at 17:25

## 2024-05-24 RX ADMIN — DEXAMETHASONE SODIUM PHOSPHATE 12 MG: 4 INJECTION, SOLUTION INTRA-ARTICULAR; INTRALESIONAL; INTRAMUSCULAR; INTRAVENOUS; SOFT TISSUE at 08:26

## 2024-05-24 RX ADMIN — Medication 6 L/MIN: at 13:26

## 2024-05-24 RX ADMIN — CEFAZOLIN 2 G: 330 INJECTION, POWDER, FOR SOLUTION INTRAMUSCULAR; INTRAVENOUS at 12:11

## 2024-05-24 RX ADMIN — HYDROMORPHONE HYDROCHLORIDE 0.5 MG: 1 INJECTION, SOLUTION INTRAMUSCULAR; INTRAVENOUS; SUBCUTANEOUS at 14:01

## 2024-05-24 RX ADMIN — ROCURONIUM BROMIDE 20 MG: 10 INJECTION, SOLUTION INTRAVENOUS at 08:30

## 2024-05-24 RX ADMIN — ACETAMINOPHEN 650 MG: 325 TABLET ORAL at 17:26

## 2024-05-24 RX ADMIN — CYCLOBENZAPRINE 10 MG: 10 TABLET, FILM COATED ORAL at 21:47

## 2024-05-24 RX ADMIN — ROCURONIUM BROMIDE 50 MG: 10 INJECTION, SOLUTION INTRAVENOUS at 07:52

## 2024-05-24 RX ADMIN — HYDROMORPHONE HYDROCHLORIDE 0.5 MG: 1 INJECTION, SOLUTION INTRAMUSCULAR; INTRAVENOUS; SUBCUTANEOUS at 10:11

## 2024-05-24 RX ADMIN — CYCLOBENZAPRINE 10 MG: 10 TABLET, FILM COATED ORAL at 17:26

## 2024-05-24 RX ADMIN — HYDROMORPHONE HYDROCHLORIDE 0.5 MG: 1 INJECTION, SOLUTION INTRAMUSCULAR; INTRAVENOUS; SUBCUTANEOUS at 14:16

## 2024-05-24 RX ADMIN — CEFAZOLIN 2 G: 330 INJECTION, POWDER, FOR SOLUTION INTRAMUSCULAR; INTRAVENOUS at 08:05

## 2024-05-24 RX ADMIN — ROCURONIUM BROMIDE 30 MG: 10 INJECTION, SOLUTION INTRAVENOUS at 11:05

## 2024-05-24 RX ADMIN — HYDROMORPHONE HYDROCHLORIDE 0.5 MG: 1 INJECTION, SOLUTION INTRAMUSCULAR; INTRAVENOUS; SUBCUTANEOUS at 08:35

## 2024-05-24 RX ADMIN — ROCURONIUM BROMIDE 10 MG: 10 INJECTION, SOLUTION INTRAVENOUS at 10:11

## 2024-05-24 RX ADMIN — ROCURONIUM BROMIDE 20 MG: 10 INJECTION, SOLUTION INTRAVENOUS at 09:16

## 2024-05-24 RX ADMIN — LIDOCAINE 4% 2 PATCH: 40 PATCH TOPICAL at 17:26

## 2024-05-24 RX ADMIN — LIDOCAINE HYDROCHLORIDE 60 MG: 20 INJECTION, SOLUTION INFILTRATION; PERINEURAL at 07:51

## 2024-05-24 RX ADMIN — ATORVASTATIN CALCIUM 20 MG: 20 TABLET, FILM COATED ORAL at 21:07

## 2024-05-24 RX ADMIN — PROPOFOL 130 MG: 10 INJECTION, EMULSION INTRAVENOUS at 07:51

## 2024-05-24 RX ADMIN — SODIUM CHLORIDE, SODIUM LACTATE, POTASSIUM CHLORIDE, AND CALCIUM CHLORIDE: 600; 310; 30; 20 INJECTION, SOLUTION INTRAVENOUS at 07:05

## 2024-05-24 RX ADMIN — ONDANSETRON 4 MG: 2 INJECTION INTRAMUSCULAR; INTRAVENOUS at 12:28

## 2024-05-24 RX ADMIN — MIDAZOLAM HYDROCHLORIDE 2 MG: 1 INJECTION, SOLUTION INTRAMUSCULAR; INTRAVENOUS at 07:46

## 2024-05-24 RX ADMIN — EPHEDRINE SULFATE 10 MG: 50 INJECTION, SOLUTION INTRAVENOUS at 08:27

## 2024-05-24 RX ADMIN — Medication 16 MCG: at 13:06

## 2024-05-24 RX ADMIN — FENTANYL CITRATE 100 MCG: 50 INJECTION, SOLUTION INTRAMUSCULAR; INTRAVENOUS at 07:51

## 2024-05-24 RX ADMIN — METHOCARBAMOL 500 MG: 100 INJECTION INTRAMUSCULAR; INTRAVENOUS at 13:18

## 2024-05-24 RX ADMIN — ACETAMINOPHEN 650 MG: 325 TABLET ORAL at 23:12

## 2024-05-24 SDOH — SOCIAL STABILITY: SOCIAL INSECURITY: DO YOU FEEL ANYONE HAS EXPLOITED OR TAKEN ADVANTAGE OF YOU FINANCIALLY OR OF YOUR PERSONAL PROPERTY?: NO

## 2024-05-24 SDOH — SOCIAL STABILITY: SOCIAL INSECURITY: HAVE YOU HAD ANY THOUGHTS OF HARMING ANYONE ELSE?: NO

## 2024-05-24 SDOH — SOCIAL STABILITY: SOCIAL INSECURITY: HAVE YOU HAD THOUGHTS OF HARMING ANYONE ELSE?: NO

## 2024-05-24 SDOH — SOCIAL STABILITY: SOCIAL INSECURITY: WERE YOU ABLE TO COMPLETE ALL THE BEHAVIORAL HEALTH SCREENINGS?: YES

## 2024-05-24 SDOH — SOCIAL STABILITY: SOCIAL INSECURITY: ABUSE: ADULT

## 2024-05-24 SDOH — SOCIAL STABILITY: SOCIAL INSECURITY: DOES ANYONE TRY TO KEEP YOU FROM HAVING/CONTACTING OTHER FRIENDS OR DOING THINGS OUTSIDE YOUR HOME?: NO

## 2024-05-24 SDOH — SOCIAL STABILITY: SOCIAL INSECURITY: HAS ANYONE EVER THREATENED TO HURT YOUR FAMILY OR YOUR PETS?: NO

## 2024-05-24 SDOH — SOCIAL STABILITY: SOCIAL INSECURITY: DO YOU FEEL UNSAFE GOING BACK TO THE PLACE WHERE YOU ARE LIVING?: NO

## 2024-05-24 SDOH — HEALTH STABILITY: MENTAL HEALTH: CURRENT SMOKER: 0

## 2024-05-24 SDOH — SOCIAL STABILITY: SOCIAL INSECURITY: ARE YOU OR HAVE YOU BEEN THREATENED OR ABUSED PHYSICALLY, EMOTIONALLY, OR SEXUALLY BY ANYONE?: NO

## 2024-05-24 SDOH — SOCIAL STABILITY: SOCIAL INSECURITY: ARE THERE ANY APPARENT SIGNS OF INJURIES/BEHAVIORS THAT COULD BE RELATED TO ABUSE/NEGLECT?: NO

## 2024-05-24 ASSESSMENT — ACTIVITIES OF DAILY LIVING (ADL)
HEARING - LEFT EAR: FUNCTIONAL
JUDGMENT_ADEQUATE_SAFELY_COMPLETE_DAILY_ACTIVITIES: YES
HEARING - RIGHT EAR: FUNCTIONAL
ASSISTIVE_DEVICE: WALKER
ASSISTIVE_DEVICE: WALKER
BATHING: INDEPENDENT
HEARING - LEFT EAR: FUNCTIONAL
BATHING: INDEPENDENT
WALKS IN HOME: INDEPENDENT
TOILETING: INDEPENDENT
HEARING - RIGHT EAR: FUNCTIONAL
JUDGMENT_ADEQUATE_SAFELY_COMPLETE_DAILY_ACTIVITIES: YES
ADEQUATE_TO_COMPLETE_ADL: YES
GROOMING: INDEPENDENT
GROOMING: INDEPENDENT
FEEDING YOURSELF: INDEPENDENT
PATIENT'S MEMORY ADEQUATE TO SAFELY COMPLETE DAILY ACTIVITIES?: YES
DRESSING YOURSELF: INDEPENDENT
TOILETING: INDEPENDENT
PATIENT'S MEMORY ADEQUATE TO SAFELY COMPLETE DAILY ACTIVITIES?: YES
ADL_ASSISTANCE: INDEPENDENT
FEEDING YOURSELF: INDEPENDENT
WALKS IN HOME: INDEPENDENT
ADEQUATE_TO_COMPLETE_ADL: YES
LACK_OF_TRANSPORTATION: NO
DRESSING YOURSELF: INDEPENDENT

## 2024-05-24 ASSESSMENT — COGNITIVE AND FUNCTIONAL STATUS - GENERAL
STANDING UP FROM CHAIR USING ARMS: A LITTLE
STANDING UP FROM CHAIR USING ARMS: A LITTLE
MOBILITY SCORE: 18
CLIMB 3 TO 5 STEPS WITH RAILING: A LITTLE
DRESSING REGULAR LOWER BODY CLOTHING: A LITTLE
WALKING IN HOSPITAL ROOM: A LITTLE
STANDING UP FROM CHAIR USING ARMS: A LITTLE
WALKING IN HOSPITAL ROOM: A LITTLE
DAILY ACTIVITIY SCORE: 20
CLIMB 3 TO 5 STEPS WITH RAILING: A LITTLE
MOBILITY SCORE: 18
WALKING IN HOSPITAL ROOM: A LITTLE
DRESSING REGULAR UPPER BODY CLOTHING: A LITTLE
MOVING TO AND FROM BED TO CHAIR: A LITTLE
TURNING FROM BACK TO SIDE WHILE IN FLAT BAD: A LITTLE
CLIMB 3 TO 5 STEPS WITH RAILING: A LITTLE
MOVING FROM LYING ON BACK TO SITTING ON SIDE OF FLAT BED WITH BEDRAILS: A LITTLE
PATIENT BASELINE BEDBOUND: NO
MOVING FROM LYING ON BACK TO SITTING ON SIDE OF FLAT BED WITH BEDRAILS: A LITTLE
HELP NEEDED FOR BATHING: A LITTLE
MOVING FROM LYING ON BACK TO SITTING ON SIDE OF FLAT BED WITH BEDRAILS: A LITTLE
TURNING FROM BACK TO SIDE WHILE IN FLAT BAD: A LITTLE
MOVING TO AND FROM BED TO CHAIR: A LITTLE
MOVING FROM LYING ON BACK TO SITTING ON SIDE OF FLAT BED WITH BEDRAILS: A LITTLE
MOVING TO AND FROM BED TO CHAIR: A LITTLE
TURNING FROM BACK TO SIDE WHILE IN FLAT BAD: A LITTLE
TOILETING: A LITTLE
TURNING FROM BACK TO SIDE WHILE IN FLAT BAD: A LITTLE

## 2024-05-24 ASSESSMENT — COLUMBIA-SUICIDE SEVERITY RATING SCALE - C-SSRS
1. IN THE PAST MONTH, HAVE YOU WISHED YOU WERE DEAD OR WISHED YOU COULD GO TO SLEEP AND NOT WAKE UP?: NO
2. HAVE YOU ACTUALLY HAD ANY THOUGHTS OF KILLING YOURSELF?: NO
6. HAVE YOU EVER DONE ANYTHING, STARTED TO DO ANYTHING, OR PREPARED TO DO ANYTHING TO END YOUR LIFE?: NO

## 2024-05-24 ASSESSMENT — PAIN - FUNCTIONAL ASSESSMENT
PAIN_FUNCTIONAL_ASSESSMENT: 0-10

## 2024-05-24 ASSESSMENT — PAIN DESCRIPTION - DESCRIPTORS
DESCRIPTORS: PRESSURE
DESCRIPTORS: ACHING

## 2024-05-24 ASSESSMENT — PAIN SCALES - GENERAL
PAINLEVEL_OUTOF10: 5 - MODERATE PAIN
PAINLEVEL_OUTOF10: 4
PAINLEVEL_OUTOF10: 3
PAINLEVEL_OUTOF10: 5 - MODERATE PAIN
PAINLEVEL_OUTOF10: 7
PAINLEVEL_OUTOF10: 6
PAINLEVEL_OUTOF10: 7
PAINLEVEL_OUTOF10: 4
PAINLEVEL_OUTOF10: 3
PAINLEVEL_OUTOF10: 4
PAINLEVEL_OUTOF10: 7
PAINLEVEL_OUTOF10: 7
PAINLEVEL_OUTOF10: 3

## 2024-05-24 ASSESSMENT — PATIENT HEALTH QUESTIONNAIRE - PHQ9
SUM OF ALL RESPONSES TO PHQ9 QUESTIONS 1 & 2: 0
1. LITTLE INTEREST OR PLEASURE IN DOING THINGS: NOT AT ALL
2. FEELING DOWN, DEPRESSED OR HOPELESS: NOT AT ALL

## 2024-05-24 ASSESSMENT — LIFESTYLE VARIABLES
AUDIT-C TOTAL SCORE: 2
AUDIT-C TOTAL SCORE: 2
SKIP TO QUESTIONS 9-10: 1
HOW MANY STANDARD DRINKS CONTAINING ALCOHOL DO YOU HAVE ON A TYPICAL DAY: 1 OR 2
HOW OFTEN DO YOU HAVE A DRINK CONTAINING ALCOHOL: 2-4 TIMES A MONTH
HOW OFTEN DO YOU HAVE 6 OR MORE DRINKS ON ONE OCCASION: NEVER

## 2024-05-24 ASSESSMENT — PAIN DESCRIPTION - LOCATION
LOCATION: BACK

## 2024-05-24 NOTE — PROGRESS NOTES
Physical Therapy    Physical Therapy Evaluation & Treatment    Patient Name: July Castillo  MRN: [id number]  Today's Date: 5/24/2024   Time Calculation  Start Time: 1630  Stop Time: 1709  Time Calculation (min): 39 min    Assessment/Plan   PT Assessment  PT Assessment Results: Decreased endurance, Impaired balance, Orthopedic restrictions, Pain  Rehab Prognosis: Good  Barriers to Discharge: Need for stair trial  Evaluation/Treatment Tolerance: Patient tolerated treatment well  Medical Staff Made Aware: Yes  Strengths: Housing layout, Support of Caregivers, Attitude of self, Ability to acquire knowledge  Barriers to Participation: Comorbidities  End of Session Communication: Bedside nurse  Assessment Comment: PT eval completed. Pt participating at fairly high level. Needing minimal hands on assist this date, some instability noted, possibly from anesthesia. Needing increased focus on HEP/Precautions for forward progression. Anticipate good progression with continued PT plan of care.   End of Session Patient Position: Alarm on, Bed, 3 rail up   IP OR SWING BED PT PLAN  Inpatient or Swing Bed: Inpatient  PT Plan  Treatment/Interventions: Bed mobility, Transfer training, Gait training, Stair training, Balance training, Neuromuscular re-education, Strengthening, Endurance training, Therapeutic exercise, Therapeutic activity, Home exercise program, Positioning, Postural re-education  PT Plan: Skilled PT  PT Frequency: Daily  PT Discharge Recommendations: No PT needed after discharge  Equipment Recommended upon Discharge:  (Dependent on progression)  PT Recommended Transfer Status: Contact guard  PT - OK to Discharge: Yes (Per PT POC)      Subjective     General Visit Information:  General  Reason for Referral: s/p Lumbar sx as follows 5/24/24: L3-5 Transforaminal Lumbar Interbody Fusion; Bilateral Lumbar Laminectomies; Right Side Facetectomies  Referred By: MD Kyle  Past Medical History Relevant to Rehab:   Past Medical  History:   Diagnosis Date    Arthritis 2010    Hyperlipidemia     Hypertension 2010    Hypothyroidism     Lumbosacral spondylosis with radiculopathy     Osteopenia     Vertigo     remote history     Past Surgical History:   Procedure Laterality Date    COLONOSCOPY  06/22/2022    TUMOR EXCISION  2005    Pleomorphic adenoma of parotid    WISDOM TOOTH EXTRACTION  1980       Family/Caregiver Present: No  Prior to Session Communication: Bedside nurse  Patient Position Received: Alarm on, Bed, 3 rail up  Preferred Learning Style: auditory, verbal, visual  General Comment: Pt encountered supine in bed, very pleasant and cooperative, eager to participate and return to walking, swimming, and hiking.  Home Living:  Home Living  Type of Home: House  Lives With: Alone (Reports supportive family and friend group to assist during recovery PRN.)  Home Adaptive Equipment: None, Reacher  Home Layout: One level  Home Access: Stairs to enter without rails  Entrance Stairs-Rails: None  Entrance Stairs-Number of Steps: 1  Bathroom Shower/Tub: Walk-in shower  Bathroom Toilet: Handicapped height, Adaptive toilet seating  Bathroom Equipment: Raised toilet seat with rails, Shower chair without back, Grab bars in shower  Prior Level of Function:  Prior Function Per Pt/Caregiver Report  Level of [place]: Independent with ADLs and functional transfers, Independent with homemaking with ambulation  Receives Help From: Family  ADL Assistance: Independent  Homemaking Assistance: Independent  Ambulatory Assistance: Independent (Ambulating without device, denies falls)  Hand Dominance: Right  Prior Function Comments: MOD I with ADLs and IADLs, +  Precautions:  Precautions  Hearing/Visual Limitations: glasses  Medical Precautions: Fall precautions  Post-Surgical Precautions: Spinal precautions (Logroll form)    Objective   Pain:  Pain Assessment  Pain Assessment: 0-10  Pain Score: 3  Pain Type: Surgical pain  Pain Location: Back  Pain  Orientation: Posterior  Pain Descriptors: Aching  Pain Frequency: Constant/continuous  Pain Onset: Ongoing  Clinical Progression: Gradually improving  Pain Interventions: Repositioned, Ambulation/increased activity, Distraction, Medication (See MAR)  Response to Interventions: Able to participate as requested  Cognition:  Cognition  Overall Cognitive Status: Within Functional Limits    General Assessments:  General Observation  General Observation: Pt cooperative, appropriate, eager to participate, reporting lightheadedness even in supine prior to mobility, unchanged throughout session, nursing aware.               Activity Tolerance  Endurance: Endurance does not limit participation in activity    Sensation  Sensation Comment: Reports had prickling on L lateral leg prior to sx, appears to have resolved    Strength  Strength Comments: minimal hands on assist needed for any upright mobility  Strength  Strength Comments: minimal hands on assist needed for any upright mobility    Perception  Inattention/Neglect: Appears intact      Coordination  Movements are Fluid and Coordinated: Yes    Postural Control  Postural Control: Within Functional Limits    Static Sitting Balance  Static Sitting-Balance Support: Feet supported, No upper extremity supported  Static Sitting-Level of Assistance: Distant supervision  Dynamic Sitting Balance  Dynamic Sitting-Balance Support: Feet supported, No upper extremity supported  Dynamic Sitting-Balance: Lateral lean, Forward lean, Reaching for objects, Trunk control activities  Dynamic Sitting-Comments: Supervision    Static Standing Balance  Static Standing-Balance Support: Right upper extremity supported  Static Standing-Level of Assistance: Contact guard  Dynamic Standing Balance  Dynamic Standing-Balance Support: Right upper extremity supported  Dynamic Standing-Balance: Reaching for objects, Forward lean, Turning (Ambulation)  Dynamic Standing-Comments: CGA  Functional  "Assessments:  Bed Mobility  Bed Mobility: Yes  Bed Mobility 1  Bed Mobility 1: Supine to sitting, Sitting to supine, Log roll  Level of Assistance 1: Close supervision  Bed Mobility Comments 1: cues for form, breathing, sequencing. No hands on assist needed. slowed completion, focus on appropriate form.    Transfers  Transfer: Yes  Transfer 1  Technique 1: Sit to stand, Stand to sit  Transfer Device 1:  (Bed rail)  Transfer Level of Assistance 1: Contact guard  Trials/Comments 1: No true hands on assist needed, comfortable with completion, appropriate form, trial x2, increased ease during second trial.    Ambulation/Gait Training  Ambulation/Gait Training Performed: Yes  Ambulation/Gait Training 1  Surface 1: Level tile  Device 1: Single point cane, No device  Assistance 1: Contact guard  Quality of Gait 1: Wide base of support, Diminished heel strike, Forward flexed posture, Shuffling gait, Decreased step length (Noted instability in standing, CGA for balance. Pt slightly  \"furniture surfing\" throughout, but able to take multiple steps. Recommended to nursing to use hand held assist to restroom this PM.)  Comments/Distance (ft) 1: 5'    Stairs  Stairs: No  Extremity/Trunk Assessments:  RUE   RUE : Within Functional Limits  LUE   LUE: Within Functional Limits  RLE   RLE : Within Functional Limits  LLE   LLE : Within Functional Limits  Treatments:  Other Activity  Other Activity Performed: Yes  Other Activity 1: Increased education to pt about plan of care, progression with therapy, HEP/Precautions packet, and participation. Increased dynamic trunk stabilization both in sitting and standing.  Outcome Measures:  [facility] Basic Mobility  Turning from your back to your side while in a flat bed without using bedrails: A little  Moving from lying on your back to sitting on the side of a flat bed without using bedrails: A little  Moving to and from bed to chair (including a wheelchair): A little  Standing up from a chair " using your arms (e.g. wheelchair or bedside chair): A little  To walk in hospital room: A little  Climbing 3-5 steps with railing: A little  Basic Mobility - Total Score: 18    Encounter Problems       Encounter Problems (Active)       Balance       STG - Maintains dynamic standing balance with upper extremity support At MOD I, safely, without LOB, device PRN        Start:  05/24/24    Expected End:  06/07/24       INTERVENTIONS:  1. Practice standing with minimal support.  2. Educate patient about standing tolerance.  3. Educate patient about independence with gait, transfers, and ADL's.  4. Educate patient about use of assistive device.  5. Educate patient about self-directed care.            Mobility       STG - Patient will ambulate At [facility] using Device PRN for 200' without LOB or gross gait deviations        Start:  05/24/24    Expected End:  06/07/24            STG - Patient will ambulate up and down a curb/step At [facility] while using No device and No HR, using non-reciprocal pattern, no LOB        Start:  05/24/24    Expected End:  06/07/24            Endurance - Pt to tolerate >/= 20 minutes therex, theract, gait and/or NMR with </= 5 minutes of rest breaks        Start:  05/24/24    Expected End:  06/07/24               PT Transfers       STG - Patient will perform bed mobility At [facility], safely, without LOB, device PRN, logroll form       Start:  05/24/24    Expected End:  06/07/24            STG - Patient will transfer sit to and from stand At [facility], safely, without LOB, device PRN        Start:  05/24/24    Expected End:  06/07/24            STG - Patient will perform car transfer At [facility], safely, without LOB, device PRN        Start:  05/24/24    Expected End:  06/07/24               Safety       Pt will verbalize and apply safety awareness and precautions 100% of time throughout entire session         Start:  05/24/24    Expected End:  06/07/24                   Education Documentation  Handouts, taught  by Benjamin Napoles PT at 5/24/2024  5:16 PM.  Learner: Patient  Readiness: Acceptance  Method: Explanation  Response: Verbalizes Understanding  Comment: HEP/Precautions packet for lumbar sx    Precautions, taught by Benjamin Napoles PT at 5/24/2024  5:16 PM.  Learner: Patient  Readiness: Acceptance  Method: Explanation  Response: Verbalizes Understanding  Comment: HEP/Precautions packet for lumbar sx    Body Mechanics, taught by Benjamin Napoles PT at 5/24/2024  5:16 PM.  Learner: Patient  Readiness: Acceptance  Method: Explanation  Response: Verbalizes Understanding  Comment: HEP/Precautions packet for lumbar sx    Home Exercise Program, taught by Benjamin Napoles PT at 5/24/2024  5:16 PM.  Learner: Patient  Readiness: Acceptance  Method: Explanation  Response: Verbalizes Understanding  Comment: HEP/Precautions packet for lumbar sx    Mobility Training, taught by Benjamin Napoles PT at 5/24/2024  5:16 PM.  Learner: Patient  Readiness: Acceptance  Method: Explanation  Response: Verbalizes Understanding  Comment: HEP/Precautions packet for lumbar sx    Education Comments  No comments found.

## 2024-05-24 NOTE — HOSPITAL COURSE
74 year old woman with history of HTN, HLD, hypothyroidism presenting with radiculopathy     5/24 s/p L3-5 TLIF/decompression with posterolateral instrumented fusion  5/25 s/p uprights with hardware in position  5/27 drain removed      On the day of discharge, the patient was considered stable from a neurosurgical standpoint.

## 2024-05-24 NOTE — ANESTHESIA PROCEDURE NOTES
Airway  Date/Time: 5/24/2024 7:55 AM  Urgency: elective    Airway not difficult    Staffing  Performed: CRNA   Authorized by: Kate Lazo MD    Performed by: NORAH Gutierrez-FROY  Patient location during procedure: OR    Indications and Patient Condition  Indications for airway management: anesthesia  Preoxygenated: yes  Patient position: sniffing  Mask difficulty assessment: 1 - vent by mask  Planned trial extubation    Final Airway Details  Final airway type: endotracheal airway      Successful airway: ETT  Cuffed: yes   Successful intubation technique: direct laryngoscopy  Endotracheal tube insertion site: oral  Blade: Pk  Blade size: #3  ETT size (mm): 7.0  Cormack-Lehane Classification: grade I - full view of glottis  Placement verified by: chest auscultation and capnometry   Measured from: teeth  ETT to teeth (cm): 21  Number of attempts at approach: 1  Number of other approaches attempted: 0

## 2024-05-24 NOTE — CARE PLAN
The patient's goals for the shift include      The clinical goals for the shift include pain control and ambulation    Problem: Pain  Goal: My pain/discomfort is manageable  Outcome: Progressing     Problem: Safety  Goal: Patient will be injury free during hospitalization  Outcome: Progressing  Goal: I will remain free of falls  Outcome: Progressing

## 2024-05-24 NOTE — BRIEF OP NOTE
Date: 2024  OR Location: [facility] OR    Name: July Castillo, : 1950, Age: 74 y.o., MRN: [id number], Sex: female    Diagnosis  Pre-op Diagnosis     * Lumbosacral spondylosis with radiculopathy [M47.27]     * Acquired spondylolisthesis of lumbosacral region [M43.17] Post-op Diagnosis     * Lumbosacral spondylosis with radiculopathy [M47.27]     * Acquired spondylolisthesis of lumbosacral region [M43.17]     Procedures  L3-5 Transforaminal Lumbar Interbody Fusion; Bilateral Lumbar Laminectomies; Right Side Facetectomies  75853 - [place] ARTHRODESIS COMBINED TQ 1NTRSPC LUMBAR    [place] ARTHRODESIS CMBN TQ 1NTRSPC EACH ADDITIONAL [51951]  [place] RIVERA FACETEC/FORAMOT DRG ARTHRD LUMBAR 1 VRT SGM [81087]  [place] RIVERA FACETEC/FORAMOT DRG ARTHRD LMBR EA ADDL SGM [49200]  [place] POSTERIOR SEGMENTAL INSTRUMENTATION 3-6 VRT SEG [37740]  [place] INSJ BIOMCHN DEV INTERVERTEBRAL DSC SPC W/ARTHRD [37560]  [place] AUTOGRAFT SPINE SURGERY LOCAL FROM SAME INCISION [34756]  Surgeons      * Demetrius Ray - Primary    Resident/Fellow/Other Assistant:  Surgeons and Role:     * Natan Roberts MD - Resident - Assisting    Procedure Summary  Anesthesia: General  ASA: III  Anesthesia Staff: Anesthesiologist: Kate Lazo MD  CRNA: NORAH Gutierrez-CRNA  Estimated Blood Loss: 500mL  Intra-op Medications: Administrations occurring from 24 1408 to 24 1408:  * No intraprocedure medications in log *           Anesthesia Record               Intraprocedure I/O Totals          Intake    LR bolus 1500.00 mL    Total Intake 1500 mL       Output    Urine 300 mL    Est. Blood Loss 500 mL    Total Output 800 mL       Net    Net Volume 700 mL          Specimen: No specimens collected     Staff:   Circulator: Haley  Scrub Person: Jose Islas Scrub: Marisa Islas Circulator: Alise          Findings: good placement of hardware    Complications:  None; patient tolerated the procedure well.     Disposition: PACU - hemodynamically stable.  Condition:  stable  Specimens Collected: No specimens collected  Attending Attestation:     Demetrius Wright  Phone Number: [phone]

## 2024-05-24 NOTE — ANESTHESIA PREPROCEDURE EVALUATION
Patient: July Castillo    Procedure Information       Date/Time: 05/24/24 0730    Procedure: L3-5 Transforaminal Lumbar Interbody Fusion; Bilateral Lumbar Laminectomies; Right Side Facetectomies (Bilateral: Spine Lumbar)    Location: [facility] OR 06 / [facility] OR    Surgeons: Demetrius Wright MD          75 yo F presenting for scheduled lumbar fusion.  +LBP, pain increased with movement and with sitting; pain improved in a reclining position    Relevant Problems   Anesthesia (within normal limits)  No hx of anesthesia complications      Cardiac   (+) HTN (hypertension)   (+) Hyperlipidemia      Neuro   (+) Lumbosacral spondylosis with radiculopathy      Endocrine   (+) Hypothyroidism      Musculoskeletal   (+) Lumbosacral spondylosis with radiculopathy       Clinical information reviewed:    Allergies                NPO Detail:  No data recorded     Physical Exam    Airway  Mallampati: II  TM distance: >3 FB  Neck ROM: full     Cardiovascular   Rhythm: regular  Rate: normal     Dental        Pulmonary   Breath sounds clear to auscultation     Abdominal      Other findings: Implant left lower; multiple crowns in molars          Anesthesia Plan    History of general anesthesia?: yes  History of complications of general anesthesia?: no    ASA 3     general     The patient is not a current smoker.  Patient did not smoke on day of procedure.    intravenous induction   Postoperative administration of opioids is intended.  Trial extubation is planned.  Anesthetic plan and risks discussed with patient.  Use of blood products discussed with patient who consented to blood products.    Plan discussed with CRNA.

## 2024-05-25 ENCOUNTER — APPOINTMENT (OUTPATIENT)
Dept: RADIOLOGY | Facility: HOSPITAL | Age: 74
DRG: 455 | End: 2024-05-25
Payer: MEDICARE

## 2024-05-25 LAB
ALBUMIN SERPL BCP-MCNC: 3.9 G/DL (ref 3.4–5)
ANION GAP SERPL CALC-SCNC: 11 MMOL/L (ref 10–20)
BASOPHILS # BLD AUTO: 0.02 X10*3/UL (ref 0–0.1)
BASOPHILS NFR BLD AUTO: 0.1 %
BUN SERPL-MCNC: 13 MG/DL (ref 6–23)
CALCIUM SERPL-MCNC: 9 MG/DL (ref 8.6–10.3)
CHLORIDE SERPL-SCNC: 103 MMOL/L (ref 98–107)
CO2 SERPL-SCNC: 28 MMOL/L (ref 21–32)
CREAT SERPL-MCNC: 0.77 MG/DL (ref 0.5–1.05)
EGFRCR SERPLBLD CKD-EPI 2021: 82 ML/MIN/1.73M*2
EOSINOPHIL # BLD AUTO: 0.01 X10*3/UL (ref 0–0.4)
EOSINOPHIL NFR BLD AUTO: 0.1 %
ERYTHROCYTE [DISTWIDTH] IN BLOOD BY AUTOMATED COUNT: 13.2 % (ref 11.5–14.5)
GLUCOSE SERPL-MCNC: 82 MG/DL (ref 74–99)
HCT VFR BLD AUTO: 29.3 % (ref 36–46)
HGB BLD-MCNC: 9.2 G/DL (ref 12–16)
IMM GRANULOCYTES # BLD AUTO: 0.12 X10*3/UL (ref 0–0.5)
IMM GRANULOCYTES NFR BLD AUTO: 0.8 % (ref 0–0.9)
LYMPHOCYTES # BLD AUTO: 2.49 X10*3/UL (ref 0.8–3)
LYMPHOCYTES NFR BLD AUTO: 17.1 %
MCH RBC QN AUTO: 31.2 PG (ref 26–34)
MCHC RBC AUTO-ENTMCNC: 31.4 G/DL (ref 32–36)
MCV RBC AUTO: 99 FL (ref 80–100)
MONOCYTES # BLD AUTO: 1.12 X10*3/UL (ref 0.05–0.8)
MONOCYTES NFR BLD AUTO: 7.7 %
NEUTROPHILS # BLD AUTO: 10.8 X10*3/UL (ref 1.6–5.5)
NEUTROPHILS NFR BLD AUTO: 74.2 %
NRBC BLD-RTO: 0 /100 WBCS (ref 0–0)
PHOSPHATE SERPL-MCNC: 2.1 MG/DL (ref 2.5–4.9)
PLATELET # BLD AUTO: 198 X10*3/UL (ref 150–450)
POTASSIUM SERPL-SCNC: 3.6 MMOL/L (ref 3.5–5.3)
RBC # BLD AUTO: 2.95 X10*6/UL (ref 4–5.2)
SODIUM SERPL-SCNC: 138 MMOL/L (ref 136–145)
WBC # BLD AUTO: 14.6 X10*3/UL (ref 4.4–11.3)

## 2024-05-25 PROCEDURE — 72100 X-RAY EXAM L-S SPINE 2/3 VWS: CPT

## 2024-05-25 PROCEDURE — 72100 X-RAY EXAM L-S SPINE 2/3 VWS: CPT | Performed by: STUDENT IN AN ORGANIZED HEALTH CARE EDUCATION/TRAINING PROGRAM

## 2024-05-25 PROCEDURE — 1100000001 HC PRIVATE ROOM DAILY

## 2024-05-25 PROCEDURE — 2500000001 HC RX 250 WO HCPCS SELF ADMINISTERED DRUGS (ALT 637 FOR MEDICARE OP): Performed by: STUDENT IN AN ORGANIZED HEALTH CARE EDUCATION/TRAINING PROGRAM

## 2024-05-25 PROCEDURE — 2500000005 HC RX 250 GENERAL PHARMACY W/O HCPCS: Performed by: STUDENT IN AN ORGANIZED HEALTH CARE EDUCATION/TRAINING PROGRAM

## 2024-05-25 PROCEDURE — 2500000004 HC RX 250 GENERAL PHARMACY W/ HCPCS (ALT 636 FOR OP/ED): Performed by: STUDENT IN AN ORGANIZED HEALTH CARE EDUCATION/TRAINING PROGRAM

## 2024-05-25 PROCEDURE — 36415 COLL VENOUS BLD VENIPUNCTURE: CPT | Performed by: STUDENT IN AN ORGANIZED HEALTH CARE EDUCATION/TRAINING PROGRAM

## 2024-05-25 PROCEDURE — 97165 OT EVAL LOW COMPLEX 30 MIN: CPT | Mod: GO

## 2024-05-25 PROCEDURE — 85025 COMPLETE CBC W/AUTO DIFF WBC: CPT | Performed by: STUDENT IN AN ORGANIZED HEALTH CARE EDUCATION/TRAINING PROGRAM

## 2024-05-25 PROCEDURE — 80069 RENAL FUNCTION PANEL: CPT | Performed by: STUDENT IN AN ORGANIZED HEALTH CARE EDUCATION/TRAINING PROGRAM

## 2024-05-25 PROCEDURE — 2500000002 HC RX 250 W HCPCS SELF ADMINISTERED DRUGS (ALT 637 FOR MEDICARE OP, ALT 636 FOR OP/ED): Mod: MUE | Performed by: STUDENT IN AN ORGANIZED HEALTH CARE EDUCATION/TRAINING PROGRAM

## 2024-05-25 RX ADMIN — HEPARIN SODIUM 5000 UNITS: 5000 INJECTION INTRAVENOUS; SUBCUTANEOUS at 13:19

## 2024-05-25 RX ADMIN — HEPARIN SODIUM 5000 UNITS: 5000 INJECTION INTRAVENOUS; SUBCUTANEOUS at 22:00

## 2024-05-25 RX ADMIN — SODIUM CHLORIDE, POTASSIUM CHLORIDE, SODIUM LACTATE AND CALCIUM CHLORIDE 100 ML/HR: 600; 310; 30; 20 INJECTION, SOLUTION INTRAVENOUS at 16:43

## 2024-05-25 RX ADMIN — SODIUM CHLORIDE, POTASSIUM CHLORIDE, SODIUM LACTATE AND CALCIUM CHLORIDE 100 ML/HR: 600; 310; 30; 20 INJECTION, SOLUTION INTRAVENOUS at 04:59

## 2024-05-25 RX ADMIN — LISINOPRIL 10 MG: 10 TABLET ORAL at 08:47

## 2024-05-25 RX ADMIN — ACETAMINOPHEN 650 MG: 325 TABLET ORAL at 18:16

## 2024-05-25 RX ADMIN — ACETAMINOPHEN 650 MG: 325 TABLET ORAL at 04:59

## 2024-05-25 RX ADMIN — LIDOCAINE 4% 2 PATCH: 40 PATCH TOPICAL at 08:47

## 2024-05-25 RX ADMIN — CYCLOBENZAPRINE 10 MG: 10 TABLET, FILM COATED ORAL at 08:47

## 2024-05-25 RX ADMIN — POLYETHYLENE GLYCOL 3350 17 G: 17 POWDER, FOR SOLUTION ORAL at 08:47

## 2024-05-25 RX ADMIN — OXYCODONE HYDROCHLORIDE 5 MG: 5 TABLET ORAL at 08:47

## 2024-05-25 RX ADMIN — OXYCODONE HYDROCHLORIDE 5 MG: 5 TABLET ORAL at 13:19

## 2024-05-25 RX ADMIN — CYCLOBENZAPRINE 10 MG: 10 TABLET, FILM COATED ORAL at 15:02

## 2024-05-25 RX ADMIN — ATORVASTATIN CALCIUM 20 MG: 20 TABLET, FILM COATED ORAL at 20:45

## 2024-05-25 RX ADMIN — HEPARIN SODIUM 5000 UNITS: 5000 INJECTION INTRAVENOUS; SUBCUTANEOUS at 05:51

## 2024-05-25 RX ADMIN — CYCLOBENZAPRINE 10 MG: 10 TABLET, FILM COATED ORAL at 20:45

## 2024-05-25 RX ADMIN — ACETAMINOPHEN 650 MG: 325 TABLET ORAL at 22:15

## 2024-05-25 RX ADMIN — ACETAMINOPHEN 650 MG: 325 TABLET ORAL at 11:53

## 2024-05-25 RX ADMIN — LEVOTHYROXINE SODIUM 88 MCG: 88 TABLET ORAL at 05:51

## 2024-05-25 SDOH — ECONOMIC STABILITY: GENERAL

## 2024-05-25 SDOH — ECONOMIC STABILITY: HOUSING INSECURITY: IN THE LAST 12 MONTHS, HOW MANY PLACES HAVE YOU LIVED?: 1

## 2024-05-25 SDOH — ECONOMIC STABILITY: FOOD INSECURITY: WITHIN THE PAST 12 MONTHS, YOU WORRIED THAT YOUR FOOD WOULD RUN OUT BEFORE YOU GOT MONEY TO BUY MORE.: NEVER TRUE

## 2024-05-25 SDOH — ECONOMIC STABILITY: INCOME INSECURITY: IN THE LAST 12 MONTHS, WAS THERE A TIME WHEN YOU WERE NOT ABLE TO PAY THE MORTGAGE OR RENT ON TIME?: NO

## 2024-05-25 SDOH — ECONOMIC STABILITY: TRANSPORTATION INSECURITY
IN THE PAST 12 MONTHS, HAS LACK OF TRANSPORTATION KEPT YOU FROM MEETINGS, WORK, OR FROM GETTING THINGS NEEDED FOR DAILY LIVING?: NO

## 2024-05-25 SDOH — ECONOMIC STABILITY: TRANSPORTATION INSECURITY: IN THE PAST 12 MONTHS, HAS LACK OF TRANSPORTATION KEPT YOU FROM MEDICAL APPOINTMENTS OR FROM GETTING MEDICATIONS?: NO

## 2024-05-25 SDOH — ECONOMIC STABILITY: HOUSING INSECURITY

## 2024-05-25 SDOH — ECONOMIC STABILITY: FOOD INSECURITY

## 2024-05-25 SDOH — ECONOMIC STABILITY: HOUSING INSECURITY: IN THE PAST 12 MONTHS HAS THE ELECTRIC, GAS, OIL, OR WATER COMPANY THREATENED TO SHUT OFF SERVICES IN YOUR HOME?: NO

## 2024-05-25 SDOH — ECONOMIC STABILITY: HOUSING INSECURITY
IN THE LAST 12 MONTHS, WAS THERE A TIME WHEN YOU DID NOT HAVE A STEADY PLACE TO SLEEP OR SLEPT IN A SHELTER (INCLUDING NOW)?: NO

## 2024-05-25 SDOH — ECONOMIC STABILITY: FOOD INSECURITY: WITHIN THE PAST 12 MONTHS, THE FOOD YOU BOUGHT JUST DIDN'T LAST AND YOU DIDN'T HAVE MONEY TO GET MORE.: NEVER TRUE

## 2024-05-25 SDOH — ECONOMIC STABILITY: FOOD INSECURITY: WITHIN THE PAST 12 MONTHS, YOU WORRIED THAT YOUR FOOD WOULD RUN OUT BEFORE YOU GOT THE MONEY TO BUY MORE.: NEVER TRUE

## 2024-05-25 SDOH — ECONOMIC STABILITY: FOOD INSECURITY: WITHIN THE PAST 12 MONTHS, THE FOOD YOU BOUGHT JUST DIDN’T LAST AND YOU DIDN’T HAVE MONEY TO GET MORE.: NEVER TRUE

## 2024-05-25 SDOH — ECONOMIC STABILITY: HOUSING INSECURITY: IN THE LAST 12 MONTHS, WAS THERE A TIME WHEN YOU WERE NOT ABLE TO PAY THE MORTGAGE OR RENT ON TIME?: NO

## 2024-05-25 SDOH — ECONOMIC STABILITY: TRANSPORTATION INSECURITY
IN THE PAST 12 MONTHS, HAS THE LACK OF TRANSPORTATION KEPT YOU FROM MEDICAL APPOINTMENTS OR FROM GETTING MEDICATIONS?: NO

## 2024-05-25 SDOH — ECONOMIC STABILITY: TRANSPORTATION INSECURITY

## 2024-05-25 ASSESSMENT — COGNITIVE AND FUNCTIONAL STATUS - GENERAL
TOILETING: A LITTLE
CLIMB 3 TO 5 STEPS WITH RAILING: A LITTLE
MOVING FROM LYING ON BACK TO SITTING ON SIDE OF FLAT BED WITH BEDRAILS: A LITTLE
TOILETING: A LITTLE
CLIMB 3 TO 5 STEPS WITH RAILING: A LITTLE
STANDING UP FROM CHAIR USING ARMS: A LITTLE
TURNING FROM BACK TO SIDE WHILE IN FLAT BAD: A LITTLE
DRESSING REGULAR LOWER BODY CLOTHING: A LITTLE
PERSONAL GROOMING: A LITTLE
MOBILITY SCORE: 18
MOVING FROM LYING ON BACK TO SITTING ON SIDE OF FLAT BED WITH BEDRAILS: A LITTLE
DRESSING REGULAR LOWER BODY CLOTHING: A LITTLE
EATING MEALS: A LITTLE
TOILETING: A LITTLE
WALKING IN HOSPITAL ROOM: A LITTLE
HELP NEEDED FOR BATHING: A LITTLE
WALKING IN HOSPITAL ROOM: A LITTLE
STANDING UP FROM CHAIR USING ARMS: A LITTLE
DAILY ACTIVITIY SCORE: 20
DRESSING REGULAR LOWER BODY CLOTHING: A LITTLE
MOVING TO AND FROM BED TO CHAIR: A LITTLE
DRESSING REGULAR UPPER BODY CLOTHING: A LITTLE
HELP NEEDED FOR BATHING: A LITTLE
DRESSING REGULAR UPPER BODY CLOTHING: A LITTLE
MOBILITY SCORE: 18
DAILY ACTIVITIY SCORE: 20
DAILY ACTIVITIY SCORE: 18
TURNING FROM BACK TO SIDE WHILE IN FLAT BAD: A LITTLE
DRESSING REGULAR UPPER BODY CLOTHING: A LITTLE
MOVING TO AND FROM BED TO CHAIR: A LITTLE
HELP NEEDED FOR BATHING: A LITTLE

## 2024-05-25 ASSESSMENT — PAIN SCALES - GENERAL
PAINLEVEL_OUTOF10: 5 - MODERATE PAIN
PAINLEVEL_OUTOF10: 5 - MODERATE PAIN
PAINLEVEL_OUTOF10: 4
PAINLEVEL_OUTOF10: 5 - MODERATE PAIN

## 2024-05-25 ASSESSMENT — PAIN DESCRIPTION - ORIENTATION: ORIENTATION: LOWER

## 2024-05-25 ASSESSMENT — SOCIAL DETERMINANTS OF HEALTH (SDOH): IN THE PAST 12 MONTHS, HAS THE ELECTRIC, GAS, OIL, OR WATER COMPANY THREATENED TO SHUT OFF SERVICE IN YOUR HOME?: NO

## 2024-05-25 ASSESSMENT — ACTIVITIES OF DAILY LIVING (ADL)
ADL_ASSISTANCE: INDEPENDENT
LACK_OF_TRANSPORTATION: NO

## 2024-05-25 ASSESSMENT — PAIN DESCRIPTION - LOCATION
LOCATION: BACK
LOCATION: BACK

## 2024-05-25 ASSESSMENT — PAIN - FUNCTIONAL ASSESSMENT
PAIN_FUNCTIONAL_ASSESSMENT: 0-10

## 2024-05-25 NOTE — CARE PLAN
Problem: Pain  Goal: My pain/discomfort is manageable  Outcome: Progressing   The patient's goals for the shift include      The clinical goals for the shift include control pain and ambulation

## 2024-05-25 NOTE — CARE PLAN
Problem: Pain  Goal: My pain/discomfort is manageable  Outcome: Progressing   The patient's goals for the shift include      The clinical goals for the shift include pain control and ambulation

## 2024-05-25 NOTE — PROGRESS NOTES
"July Castillo is a 74 y.o. female on day 1 of admission presenting with Spondylolysis.    Subjective   NAEON       Objective     Physical Exam  A%Ox3  Face symmetric  Fcx4   5/5  SILT  Incisions c/d/I  Drain in place    Last Recorded Vitals  Blood pressure 128/70, pulse 76, temperature 36.6 °C (97.9 °F), resp. rate 18, height 1.6 m (5' 3\"), weight 66.2 kg (145 lb 15.1 oz), SpO2 96%.  Intake/Output last 3 Shifts:  I/O last 3 completed shifts:  In: 3365 (50.8 mL/kg) [P.O.:240; I.V.:1625 (24.5 mL/kg); IV Piggyback:1500]  Out: 2550 (38.5 mL/kg) [Urine:1800 (0.8 mL/kg/hr); Drains:250; Blood:500]  Dosing Weight: 66.2 kg     Relevant Results                             Assessment/Plan   Principal Problem:    Spondylolysis  Active Problems:    HTN (hypertension)    Hyperlipidemia    Hypothyroidism    Lumbosacral spondylosis with radiculopathy    Acquired spondylolisthesis of lumbosacral region    Pt is a 75 yo F w/ h/o HTN, HLD, hypoT, p/w radiculopathy    5/24 s/p L3-5 TLIF/decompression with posterolateral instrumented fusion    Plan:  Floor  Uprights today  Maintain drain until discharge  Pain regimen  Home meds  PTOT  SCDs, SQH    Dispo pending PTOT evaluation, drain, and pain management             David Greer MD      "

## 2024-05-25 NOTE — PROGRESS NOTES
Occupational Therapy    Evaluation    Patient Name: July Castillo  MRN: [id number]  Today's Date: 5/25/2024  Time Calculation  Start Time: 1130  Stop Time: 1145  Time Calculation (min): 15 min        Assessment:  OT Assessment: Pt presents to occupational therapy evaluation following spinal surgery with minimal pain, minimally decreased strength/endurance, decreased activity tolerance and functioning below functional capacity in regards to ADLs/IADLs and functional mobility.  Prognosis: Good  Barriers to Discharge: Decreased caregiver support  Evaluation/Treatment Tolerance: Patient tolerated treatment well  Medical Staff Made Aware: Yes  End of Session Communication: Bedside nurse  End of Session Patient Position: Up in chair, Alarm on  OT Assessment Results: Decreased ADL status, Decreased upper extremity strength, Decreased endurance, Decreased functional mobility  Prognosis: Good  Barriers to Discharge: Decreased caregiver support  Evaluation/Treatment Tolerance: Patient tolerated treatment well  Medical Staff Made Aware: Yes  Strengths: Ability to acquire knowledge, Attitude of self, Coping skills  Barriers to Participation: Comorbidities  Plan:  Treatment Interventions: ADL retraining, UE strengthening/ROM, Functional transfer training, Endurance training  OT Frequency: 3 times per week  OT Discharge Recommendations: Low intensity level of continued care  Equipment Recommended upon Discharge: Wheeled walker  OT Recommended Transfer Status: Stand by assist  OT - OK to Discharge: Yes (Per POC)  Treatment Interventions: ADL retraining, UE strengthening/ROM, Functional transfer training, Endurance training    Subjective   Current Problem:  1. Lumbosacral spondylosis with radiculopathy  FL less than 1 hour    FL less than 1 hour      2. Spondylolysis          General:  General  Reason for Referral: s/p Lumbar sx as follows 5/24/24: L3-5 Transforaminal Lumbar Interbody Fusion; Bilateral Lumbar Laminectomies; Right  Side Facetectomies  Referred By: MD Kyle  Past Medical History Relevant to Rehab: HTN, HLD  Family/Caregiver Present: No  Prior to Session Communication: Bedside nurse  Patient Position Received: Up in chair, Alarm on  Preferred Learning Style: auditory, verbal  General Comment: Pt cooperative and agreeable with OT evaluation  Precautions:  Medical Precautions: Fall precautions  Post-Surgical Precautions: Spinal precautions  Vital Signs:     Pain:  Pain Assessment  Pain Assessment: 0-10  Pain Score: 5 - Moderate pain  Pain Type: Acute pain, Surgical pain  Pain Location: Back    Objective   Cognition:  Overall Cognitive Status: Within Functional Limits  Orientation Level: Oriented X4  Memory: Within Funtional Limits  Problem Solving: Within Functional Limits  Safety/Judgement: Within Functional Limits           Home Living:  Type of Home: House  Lives With: Alone (Has supportive family/friends)  Home Adaptive Equipment: None, Reacher  Home Layout: One level  Home Access: Stairs to enter without rails  Entrance Stairs-Rails: None  Entrance Stairs-Number of Steps: 1  Bathroom Shower/Tub: Walk-in shower  Bathroom Toilet: Handicapped height, Adaptive toilet seating  Bathroom Equipment: Raised toilet seat with rails, Shower chair without back, Grab bars in shower  Prior Function:  Level of [place]: Independent with ADLs and functional transfers, Independent with homemaking with ambulation  Receives Help From: Family  ADL Assistance: Independent  Homemaking Assistance: Independent  Ambulatory Assistance: Independent (ambulates without device and denies falls)  Hand Dominance: Right  Prior Function Comments: MOD I with ADLs and IADLs, +  IADL History:  Homemaking Responsibilities: No (Family can assist; plans to do grocery delivery)  ADL:  Eating Assistance: Independent  Grooming Assistance: Stand by  UE Dressing Assistance: Minimal  Toileting Assistance with Device: Stand by  Activity Tolerance:     Bed  Mobility/Transfers: Bed Mobility  Bed Mobility: Yes  Bed Mobility 1  Bed Mobility 1: Supine to sitting, Log roll  Level of Assistance 1: Contact guard    Transfers  Transfer: Yes  Transfer 1  Transfer From 1: Sit to  Transfer to 1: Stand  Technique 1: Stand to sit  Transfer Device 1: Walker  Transfer Level of Assistance 1: Contact guard  Trials/Comments 1: No device needed      Functional Mobility:  Functional Mobility  Functional Mobility Performed: Yes (From bed > bathroom > chair with CGA and no device)  Sitting Balance:  Static Sitting Balance  Static Sitting-Balance Support: No upper extremity supported, Feet supported  Static Sitting-Level of Assistance: Independent  Dynamic Sitting Balance  Dynamic Sitting-Balance Support: No upper extremity supported, Feet supported  Dynamic Sitting-Balance: Forward lean  Dynamic Sitting-Comments: Distant supervision  Standing Balance:  Static Standing Balance  Static Standing-Balance Support: No upper extremity supported  Static Standing-Level of Assistance: Contact guard  Dynamic Standing Balance  Dynamic Standing-Balance Support: No upper extremity supported  Dynamic Standing-Balance: Staggered stance, Turning  Dynamic Standing-Comments: CGA, no device   Modalities:     Vision:Vision - Basic Assessment  Current Vision: Wears glasses all the time  Sensation:  Light Touch:  (Reports superficial numbness/tingling in B thighs)  Strength:  Strength Comments: Appears active with good overall strength  Perception:  Inattention/Neglect: Appears intact  Coordination:  Movements are Fluid and Coordinated: Yes   Hand Function:  Gross Grasp: Functional  Coordination: Functional      Outcome Measures:[facility] Daily Activity  Putting on and taking off regular lower body clothing: A little  Bathing (including washing, rinsing, drying): A little  Putting on and taking off regular upper body clothing: A little  Toileting, which includes using toilet, bedpan or urinal: A little  Taking care  of personal grooming such as brushing teeth: None  Eating Meals: None  Daily Activity - Total Score: 20        Education Documentation  Body Mechanics, taught by Shaila Up OT at 5/25/2024 12:11 PM.  Learner: Patient  Readiness: Acceptance  Method: Explanation  Response: Verbalizes Understanding    Precautions, taught by Shaila Up OT at 5/25/2024 12:11 PM.  Learner: Patient  Readiness: Acceptance  Method: Explanation  Response: Verbalizes Understanding    Home Exercise Program, taught by Shaila Up OT at 5/25/2024 12:11 PM.  Learner: Patient  Readiness: Acceptance  Method: Explanation  Response: Verbalizes Understanding    ADL Training, taught by Shaila Up OT at 5/25/2024 12:11 PM.  Learner: Patient  Readiness: Acceptance  Method: Explanation  Response: Verbalizes Understanding    Education Comments  No comments found.        OP EDUCATION:  Education  Individual(s) Educated: Patient  Education Provided: Anatomy & Physiology, Diagnosis & Precautions  Risk and Benefits Discussed with Patient/Caregiver/Other: yes  Patient/Caregiver Demonstrated Understanding: yes  Plan of Care Discussed and Agreed Upon: yes  Patient Response to Education: Patient/Caregiver Verbalized Understanding of Information    Goals:  Encounter Problems       Encounter Problems (Active)       ADLs       Patient will perform UB and LB bathing with modified independent level of assistance and raised toilet seat and grab bars.       Start:  05/25/24    Expected End:  06/08/24            Patient with complete upper body dressing with independent level of assistance donning and doffing all UE clothes with no adaptive equipment while edge of bed        Start:  05/25/24    Expected End:  06/08/24            Patient with complete lower body dressing with modified independent level of assistance donning and doffing all LE clothes  with PRN adaptive equipment while edge of bed        Start:  05/25/24     Expected End:  06/08/24            Patient will complete daily grooming tasks brushing teeth and washing face/hair with modified independent level of assistance and PRN adaptive equipment while edge of bed  and standing.       Start:  05/25/24    Expected End:  06/08/24            Patient will complete toileting including hygiene clothing management/hygiene with modified independent level of assistance and raised toilet seat and grab bars.       Start:  05/25/24    Expected End:  06/08/24               MOBILITY       Patient will perform Functional mobility with modified independent level of assistance and least restrictive device in order to improve safety and functional mobility.       Start:  05/25/24    Expected End:  06/08/24               TRANSFERS       Patient will perform bed mobility modified independent level of assistance and bed rails and draw sheet in order to improve safety and independence with mobility       Start:  05/25/24    Expected End:  06/08/24            Patient will complete functional transfer to toilet with least restrictive device with modified independent level of assistance.       Start:  05/25/24    Expected End:  06/08/24            Patient will complete sit to stand transfer with modified independent level of assistance and least restrictive device in order to improve safety and prepare for out of bed mobility.       Start:  05/25/24    Expected End:  06/08/24

## 2024-05-26 PROCEDURE — 2500000004 HC RX 250 GENERAL PHARMACY W/ HCPCS (ALT 636 FOR OP/ED): Performed by: STUDENT IN AN ORGANIZED HEALTH CARE EDUCATION/TRAINING PROGRAM

## 2024-05-26 PROCEDURE — 2500000001 HC RX 250 WO HCPCS SELF ADMINISTERED DRUGS (ALT 637 FOR MEDICARE OP): Performed by: STUDENT IN AN ORGANIZED HEALTH CARE EDUCATION/TRAINING PROGRAM

## 2024-05-26 PROCEDURE — 2500000002 HC RX 250 W HCPCS SELF ADMINISTERED DRUGS (ALT 637 FOR MEDICARE OP, ALT 636 FOR OP/ED): Performed by: STUDENT IN AN ORGANIZED HEALTH CARE EDUCATION/TRAINING PROGRAM

## 2024-05-26 PROCEDURE — 97530 THERAPEUTIC ACTIVITIES: CPT | Mod: GP,CQ

## 2024-05-26 PROCEDURE — 97116 GAIT TRAINING THERAPY: CPT | Mod: GP,CQ

## 2024-05-26 PROCEDURE — 1100000001 HC PRIVATE ROOM DAILY

## 2024-05-26 PROCEDURE — 2500000005 HC RX 250 GENERAL PHARMACY W/O HCPCS: Performed by: STUDENT IN AN ORGANIZED HEALTH CARE EDUCATION/TRAINING PROGRAM

## 2024-05-26 RX ADMIN — CYCLOBENZAPRINE 10 MG: 10 TABLET, FILM COATED ORAL at 15:10

## 2024-05-26 RX ADMIN — ACETAMINOPHEN 650 MG: 325 TABLET ORAL at 05:41

## 2024-05-26 RX ADMIN — POLYETHYLENE GLYCOL 3350 17 G: 17 POWDER, FOR SOLUTION ORAL at 09:03

## 2024-05-26 RX ADMIN — LISINOPRIL 10 MG: 10 TABLET ORAL at 09:03

## 2024-05-26 RX ADMIN — LEVOTHYROXINE SODIUM 88 MCG: 88 TABLET ORAL at 05:43

## 2024-05-26 RX ADMIN — CYCLOBENZAPRINE 10 MG: 10 TABLET, FILM COATED ORAL at 09:03

## 2024-05-26 RX ADMIN — HEPARIN SODIUM 5000 UNITS: 5000 INJECTION INTRAVENOUS; SUBCUTANEOUS at 21:58

## 2024-05-26 RX ADMIN — HEPARIN SODIUM 5000 UNITS: 5000 INJECTION INTRAVENOUS; SUBCUTANEOUS at 15:10

## 2024-05-26 RX ADMIN — CYCLOBENZAPRINE 10 MG: 10 TABLET, FILM COATED ORAL at 20:51

## 2024-05-26 RX ADMIN — LIDOCAINE 4% 2 PATCH: 40 PATCH TOPICAL at 09:03

## 2024-05-26 RX ADMIN — HEPARIN SODIUM 5000 UNITS: 5000 INJECTION INTRAVENOUS; SUBCUTANEOUS at 05:42

## 2024-05-26 RX ADMIN — ACETAMINOPHEN 650 MG: 325 TABLET ORAL at 11:07

## 2024-05-26 RX ADMIN — ACETAMINOPHEN 650 MG: 325 TABLET ORAL at 23:59

## 2024-05-26 RX ADMIN — ACETAMINOPHEN 650 MG: 325 TABLET ORAL at 18:46

## 2024-05-26 RX ADMIN — ATORVASTATIN CALCIUM 20 MG: 20 TABLET, FILM COATED ORAL at 20:51

## 2024-05-26 SDOH — ECONOMIC STABILITY: FOOD INSECURITY: WITHIN THE PAST 12 MONTHS, THE FOOD YOU BOUGHT JUST DIDN'T LAST AND YOU DIDN'T HAVE MONEY TO GET MORE.: NEVER TRUE

## 2024-05-26 SDOH — HEALTH STABILITY: MENTAL HEALTH: HOW OFTEN DO YOU HAVE A DRINK CONTAINING ALCOHOL?: 2-4 TIMES A MONTH

## 2024-05-26 SDOH — ECONOMIC STABILITY: HOUSING INSECURITY: IN THE LAST 12 MONTHS, HOW MANY PLACES HAVE YOU LIVED?: 1

## 2024-05-26 SDOH — HEALTH STABILITY: MENTAL HEALTH: HOW MANY STANDARD DRINKS CONTAINING ALCOHOL DO YOU HAVE ON A TYPICAL DAY?: 1 OR 2

## 2024-05-26 SDOH — HEALTH STABILITY: MENTAL HEALTH: HOW OFTEN DO YOU HAVE 6 OR MORE DRINKS ON ONE OCCASION?: NEVER

## 2024-05-26 SDOH — SOCIAL STABILITY: SOCIAL NETWORK: ARE YOU MARRIED, WIDOWED, DIVORCED, SEPARATED, NEVER MARRIED, OR LIVING WITH A PARTNER?: WIDOWED

## 2024-05-26 SDOH — ECONOMIC STABILITY: INCOME INSECURITY: HOW HARD IS IT FOR YOU TO PAY FOR THE VERY BASICS LIKE FOOD, HOUSING, MEDICAL CARE, AND HEATING?: NOT HARD AT ALL

## 2024-05-26 SDOH — ECONOMIC STABILITY: INCOME INSECURITY: IN THE PAST 12 MONTHS, HAS THE ELECTRIC, GAS, OIL, OR WATER COMPANY THREATENED TO SHUT OFF SERVICE IN YOUR HOME?: NO

## 2024-05-26 SDOH — ECONOMIC STABILITY: INCOME INSECURITY: IN THE LAST 12 MONTHS, WAS THERE A TIME WHEN YOU WERE NOT ABLE TO PAY THE MORTGAGE OR RENT ON TIME?: NO

## 2024-05-26 SDOH — ECONOMIC STABILITY: FOOD INSECURITY: WITHIN THE PAST 12 MONTHS, YOU WORRIED THAT YOUR FOOD WOULD RUN OUT BEFORE YOU GOT MONEY TO BUY MORE.: NEVER TRUE

## 2024-05-26 ASSESSMENT — COGNITIVE AND FUNCTIONAL STATUS - GENERAL
DRESSING REGULAR LOWER BODY CLOTHING: A LITTLE
HELP NEEDED FOR BATHING: A LITTLE
TOILETING: A LITTLE
DRESSING REGULAR UPPER BODY CLOTHING: A LITTLE
MOBILITY SCORE: 23
CLIMB 3 TO 5 STEPS WITH RAILING: A LITTLE
PERSONAL GROOMING: A LITTLE
DAILY ACTIVITIY SCORE: 19
MOBILITY SCORE: 24
MOBILITY SCORE: 23
CLIMB 3 TO 5 STEPS WITH RAILING: A LITTLE

## 2024-05-26 ASSESSMENT — PAIN - FUNCTIONAL ASSESSMENT
PAIN_FUNCTIONAL_ASSESSMENT: 0-10

## 2024-05-26 ASSESSMENT — ACTIVITIES OF DAILY LIVING (ADL): LACK_OF_TRANSPORTATION: NO

## 2024-05-26 ASSESSMENT — PAIN DESCRIPTION - LOCATION
LOCATION: BACK
LOCATION: BACK

## 2024-05-26 ASSESSMENT — PAIN DESCRIPTION - ORIENTATION: ORIENTATION: LOWER

## 2024-05-26 ASSESSMENT — PAIN DESCRIPTION - DESCRIPTORS: DESCRIPTORS: ACHING

## 2024-05-26 ASSESSMENT — PAIN SCALES - GENERAL
PAINLEVEL_OUTOF10: 5 - MODERATE PAIN
PAINLEVEL_OUTOF10: 2
PAINLEVEL_OUTOF10: 5 - MODERATE PAIN
PAINLEVEL_OUTOF10: 5 - MODERATE PAIN

## 2024-05-26 ASSESSMENT — LIFESTYLE VARIABLES
SKIP TO QUESTIONS 9-10: 1
AUDIT-C TOTAL SCORE: 2

## 2024-05-26 NOTE — PROGRESS NOTES
05/26/24 1235   Discharge Planning   Living Arrangements Alone   Support Systems Children;Family members   Assistance Needed None   Type of Residence Private residence   Number of Stairs to Enter Residence 0   Number of Stairs Within Residence 0   Do you have animals or pets at home? Yes   Type of Animals or Pets one dog   Who is requesting discharge planning? Patient   Home or Post Acute Services In home services   Type of Home Care Services Home OT;Home PT   Patient expects to be discharged to: Home   Does the patient need discharge transport arranged? No   Financial Resource Strain   How hard is it for you to pay for the very basics like food, housing, medical care, and heating? Not hard   Housing Stability   In the last 12 months, was there a time when you were not able to pay the mortgage or rent on time? N   In the last 12 months, how many places have you lived? 1   In the last 12 months, was there a time when you did not have a steady place to sleep or slept in a shelter (including now)? N   Transportation Needs   In the past 12 months, has lack of transportation kept you from medical appointments or from getting medications? no   In the past 12 months, has lack of transportation kept you from meetings, work, or from getting things needed for daily living? No   Patient Choice   Provider Choice list and CMS website (https://medicare.gov/care-compare#search) for post-acute Quality and Resource Measure Data were provided and reviewed with: Patient   Patient / Family choosing to utilize agency / facility established prior to hospitalization No     5/26/24 1235  Met with patient at bedside to discuss discharge planning.  Patient lives at home alone in a condo.  She is independent with ADLs and drives at baseline.  She does not use any assistive devices but she does have a walker for discharge.  Her son and daughter in law will be staying with her at discharge.  She would like to have [facility] for PT/OT.  Explained  options and expectations of [facility] and she would like to use [facility].  Dr Greer notified that referral will be needed.  ADOD tomorrow.  Rachel Orona RN [facility]

## 2024-05-26 NOTE — PROGRESS NOTES
POC discussed with patient, patient verbalizes understanding   Physical Therapy    Physical Therapy Treatment    Patient Name: July Castillo  MRN: [id number]  Today's Date: 5/26/2024  Time Calculation  Start Time: 0819  Stop Time: 0854  Time Calculation (min): 35 min    Assessment/Plan   PT Assessment  PT Assessment Results: Decreased endurance, Impaired balance, Orthopedic restrictions, Pain  Rehab Prognosis: Good  Barriers to Discharge: Need for stair trial (Pt stated no stairs to negotiate)  Evaluation/Treatment Tolerance: Patient tolerated treatment well  Medical Staff Made Aware: Yes  Strengths: Ability to acquire knowledge, Attitude of self, Coping skills  Barriers to Participation: Comorbidities  End of Session Communication: Bedside nurse  Assessment Comment: Pt tolerated session well. Pt is SUPV/VARGHESE for all aspects of functioanl mobility. Pt is demonstrating good overall safety with precautions intact. No instability, SOB or dizziness noted.  End of Session Patient Position: Up in chair     PT Plan  Treatment/Interventions: Bed mobility, Transfer training, Gait training, Stair training, Balance training, Neuromuscular re-education, Strengthening, Endurance training, Therapeutic exercise, Therapeutic activity, Home exercise program, Positioning, Postural re-education  PT Plan: Skilled PT  PT Frequency: Daily  PT Discharge Recommendations: No PT needed after discharge  Equipment Recommended upon Discharge:  (Dependent on progression)  PT Recommended Transfer Status: Contact guard  PT - OK to Discharge: Yes (Per PT POC)      General Visit Information:   PT  Visit  PT Received On: 05/26/24  General  Reason for Referral: s/p Lumbar sx as follows 5/24/24: L3-5 Transforaminal Lumbar Interbody Fusion; Bilateral Lumbar Laminectomies; Right Side Facetectomies  Referred By: MD Kyle  Past Medical History Relevant to Rehab: HTN, HLD  Family/Caregiver Present: No  Prior to Session Communication: Bedside nurse  Patient Position Received: Bed, 2 rail up  Preferred Learning Style:  auditory, verbal  General Comment: Pt agreeable to therapy    Subjective   Precautions:     Vital Signs:       Objective   Pain:  Pain Assessment  Pain Assessment: 0-10  Pain Score: 5 - Moderate pain  Pain Type: Acute pain  Pain Location: Back  Pain Orientation: Lower  Pain Descriptors: Aching  Pain Frequency: Constant/continuous  Pain Onset: Ongoing  Clinical Progression: Gradually improving  Patient's Stated Pain Goal: 5  Pain Interventions: Repositioned  Response to Interventions: 4/10  Cognition:     Coordination:     Postural Control:       Activity Tolerance:  Activity Tolerance  Endurance: Endurance does not limit participation in activity  Treatments:  Bed Mobility  Bed Mobility: Yes  Bed Mobility 1  Bed Mobility 1: Supine to sitting, Log roll  Level of Assistance 1: Distant supervision  Bed Mobility Comments 1: good technique, precaution intact    Ambulation/Gait Training  Ambulation/Gait Training Performed: Yes  Ambulation/Gait Training 1  Surface 1: Level tile  Device 1: No device  Assistance 1: Close supervision  Quality of Gait 1: Narrow base of support, Diminished heel strike, Decreased step length  Comments/Distance (ft) 1: [id number]  Transfers  Transfer: Yes  Transfer 1  Transfer From 1: Sit to  Transfer to 1: Stand  Technique 1: Stand to sit  Transfer Level of Assistance 1: Distant supervision  Trials/Comments 1: no AD    Outcome Measures:  [facility] Basic Mobility  Turning from your back to your side while in a flat bed without using bedrails: None  Moving from lying on your back to sitting on the side of a flat bed without using bedrails: None  Moving to and from bed to chair (including a wheelchair): None  Standing up from a chair using your arms (e.g. wheelchair or bedside chair): None  To walk in hospital room: None  Climbing 3-5 steps with railing: A little  Basic Mobility - Total Score: 23    Education Documentation  No documentation found.  Education Comments  No comments found.        OP  EDUCATION:       Encounter Problems       Encounter Problems (Active)       Balance       STG - Maintains dynamic standing balance with upper extremity support At [facility] I, safely, without LOB, device PRN        Start:  05/24/24    Expected End:  06/07/24       INTERVENTIONS:  1. Practice standing with minimal support.  2. Educate patient about standing tolerance.  3. Educate patient about independence with gait, transfers, and ADL's.  4. Educate patient about use of assistive device.  5. Educate patient about self-directed care.            Mobility       STG - Patient will ambulate At [facility] I using Device PRN for 200' without LOB or gross gait deviations        Start:  05/24/24    Expected End:  06/07/24            STG - Patient will ambulate up and down a curb/step At [facility] while using No device and No HR, using non-reciprocal pattern, no LOB        Start:  05/24/24    Expected End:  06/07/24            Endurance - Pt to tolerate >/= 20 minutes therex, theract, gait and/or NMR with </= 5 minutes of rest breaks        Start:  05/24/24    Expected End:  06/07/24               PT Transfers       STG - Patient will perform bed mobility At [facility], safely, without LOB, device PRN, logroll form       Start:  05/24/24    Expected End:  06/07/24            STG - Patient will transfer sit to and from stand At [facility], safely, without LOB, device PRN        Start:  05/24/24    Expected End:  06/07/24            STG - Patient will perform car transfer At [facility], safely, without LOB, device PRN        Start:  05/24/24    Expected End:  06/07/24               Safety       Pt will verbalize and apply safety awareness and precautions 100% of time throughout entire session         Start:  05/24/24    Expected End:  06/07/24

## 2024-05-26 NOTE — CARE PLAN
Problem: Pain  Goal: My pain/discomfort is manageable  Outcome: Progressing     Problem: Safety  Goal: Patient will be injury free during hospitalization  Outcome: Progressing  Goal: I will remain free of falls  Outcome: Progressing     Problem: Daily Care  Goal: Daily care needs are met  Outcome: Progressing     Problem: Psychosocial Needs  Goal: Demonstrates ability to cope with hospitalization/illness  Outcome: Progressing  Goal: Collaborate with me, my family, and caregiver to identify my specific goals  Outcome: Progressing     Problem: Discharge Barriers  Goal: My discharge needs are met  Outcome: Progressing     Problem: Pain  Goal: Takes deep breaths with improved pain control throughout the shift  Outcome: Progressing  Goal: Turns in bed with improved pain control throughout the shift  Outcome: Progressing  Goal: Walks with improved pain control throughout the shift  Outcome: Progressing  Goal: Performs ADL's with improved pain control throughout shift  Outcome: Progressing  Goal: Participates in PT with improved pain control throughout the shift  Outcome: Progressing  Goal: Free from opioid side effects throughout the shift  Outcome: Progressing  Goal: Free from acute confusion related to pain meds throughout the shift  Outcome: Progressing   The patient's goals for the shift include      The clinical goals for the shift include pt pain to be managed through shift

## 2024-05-26 NOTE — PROGRESS NOTES
"July Castillo is a 74 y.o. female on day 2 of admission presenting with Spondylolysis.    Subjective   NAEON       Objective     Physical Exam  A&Ox3  Face symmetric  Fcx4   5/5  SILT  Incisions c/d/I  Drain in place    Last Recorded Vitals  Blood pressure 128/79, pulse 88, temperature 37.3 °C (99.2 °F), temperature source Temporal, resp. rate 18, height 1.6 m (5' 3\"), weight 66.2 kg (145 lb 15.1 oz), SpO2 97%.  Intake/Output last 3 Shifts:  I/O last 3 completed shifts:  In: 1801.7 (27.2 mL/kg) [P.O.:820; I.V.:981.7 (14.8 mL/kg)]  Out: 2702 (40.8 mL/kg) [Urine:2452 (1 mL/kg/hr); Drains:250]  Dosing Weight: 66.2 kg     Relevant Results                             Assessment/Plan   Principal Problem:    Spondylolysis  Active Problems:    HTN (hypertension)    Hyperlipidemia    Hypothyroidism    Lumbosacral spondylosis with radiculopathy    Acquired spondylolisthesis of lumbosacral region    Pt is a 75 yo F w/ h/o HTN, HLD, hypoT, p/w radiculopathy     5/24 s/p L3-5 TLIF/decompression with posterolateral instrumented fusion  5/25 s/p uprights with hardware in position    Plan:  Floor  Maintain drain until discharge  Pain regimen  Home meds  PTOT- low intensity  SCDs, SQH             David Greer MD      "

## 2024-05-27 ENCOUNTER — HOME HEALTH ADMISSION (OUTPATIENT)
Dept: HOME HEALTH SERVICES | Facility: HOME HEALTH | Age: 74
End: 2024-05-27
Payer: MEDICARE

## 2024-05-27 VITALS
HEIGHT: 63 IN | OXYGEN SATURATION: 99 % | DIASTOLIC BLOOD PRESSURE: 66 MMHG | HEART RATE: 91 BPM | BODY MASS INDEX: 25.86 KG/M2 | TEMPERATURE: 97.7 F | WEIGHT: 145.94 LBS | RESPIRATION RATE: 18 BRPM | SYSTOLIC BLOOD PRESSURE: 120 MMHG

## 2024-05-27 PROBLEM — M43.17 ACQUIRED SPONDYLOLISTHESIS OF LUMBOSACRAL REGION: Status: RESOLVED | Noted: 2024-04-04 | Resolved: 2024-05-27

## 2024-05-27 PROBLEM — M43.00 SPONDYLOLYSIS: Status: RESOLVED | Noted: 2024-05-24 | Resolved: 2024-05-27

## 2024-05-27 PROCEDURE — 2500000001 HC RX 250 WO HCPCS SELF ADMINISTERED DRUGS (ALT 637 FOR MEDICARE OP): Performed by: STUDENT IN AN ORGANIZED HEALTH CARE EDUCATION/TRAINING PROGRAM

## 2024-05-27 PROCEDURE — 2500000004 HC RX 250 GENERAL PHARMACY W/ HCPCS (ALT 636 FOR OP/ED): Performed by: STUDENT IN AN ORGANIZED HEALTH CARE EDUCATION/TRAINING PROGRAM

## 2024-05-27 PROCEDURE — 2500000005 HC RX 250 GENERAL PHARMACY W/O HCPCS: Performed by: STUDENT IN AN ORGANIZED HEALTH CARE EDUCATION/TRAINING PROGRAM

## 2024-05-27 PROCEDURE — 2500000002 HC RX 250 W HCPCS SELF ADMINISTERED DRUGS (ALT 637 FOR MEDICARE OP, ALT 636 FOR OP/ED): Performed by: STUDENT IN AN ORGANIZED HEALTH CARE EDUCATION/TRAINING PROGRAM

## 2024-05-27 RX ORDER — CYCLOBENZAPRINE HCL 10 MG
10 TABLET ORAL 3 TIMES DAILY PRN
Qty: 90 TABLET | Refills: 0 | Status: SHIPPED | OUTPATIENT
Start: 2024-05-27 | End: 2024-06-26

## 2024-05-27 RX ORDER — OXYCODONE HYDROCHLORIDE 5 MG/1
5 TABLET ORAL EVERY 4 HOURS PRN
Qty: 30 TABLET | Refills: 0 | Status: SHIPPED | OUTPATIENT
Start: 2024-05-27 | End: 2024-06-01

## 2024-05-27 RX ORDER — ACETAMINOPHEN 325 MG/1
650 TABLET ORAL EVERY 6 HOURS PRN
Qty: 240 TABLET | Refills: 0 | Status: SHIPPED | OUTPATIENT
Start: 2024-05-27 | End: 2024-06-26

## 2024-05-27 RX ORDER — LIDOCAINE 560 MG/1
2 PATCH PERCUTANEOUS; TOPICAL; TRANSDERMAL DAILY
Qty: 60 PATCH | Refills: 0 | Status: SHIPPED | OUTPATIENT
Start: 2024-05-27 | End: 2024-06-26

## 2024-05-27 RX ADMIN — HEPARIN SODIUM 5000 UNITS: 5000 INJECTION INTRAVENOUS; SUBCUTANEOUS at 05:59

## 2024-05-27 RX ADMIN — LISINOPRIL 10 MG: 10 TABLET ORAL at 09:44

## 2024-05-27 RX ADMIN — ACETAMINOPHEN 650 MG: 325 TABLET ORAL at 05:35

## 2024-05-27 RX ADMIN — LEVOTHYROXINE SODIUM 88 MCG: 88 TABLET ORAL at 05:35

## 2024-05-27 RX ADMIN — ACETAMINOPHEN 650 MG: 325 TABLET ORAL at 11:56

## 2024-05-27 RX ADMIN — LIDOCAINE 4% 2 PATCH: 40 PATCH TOPICAL at 09:44

## 2024-05-27 RX ADMIN — CYCLOBENZAPRINE 10 MG: 10 TABLET, FILM COATED ORAL at 09:50

## 2024-05-27 ASSESSMENT — COGNITIVE AND FUNCTIONAL STATUS - GENERAL
TOILETING: A LITTLE
CLIMB 3 TO 5 STEPS WITH RAILING: A LITTLE
DRESSING REGULAR UPPER BODY CLOTHING: A LITTLE
MOBILITY SCORE: 23
DRESSING REGULAR LOWER BODY CLOTHING: A LITTLE
HELP NEEDED FOR BATHING: A LITTLE
DAILY ACTIVITIY SCORE: 19
PERSONAL GROOMING: A LITTLE

## 2024-05-27 ASSESSMENT — PAIN DESCRIPTION - LOCATION
LOCATION: BACK
LOCATION: BACK

## 2024-05-27 ASSESSMENT — PAIN - FUNCTIONAL ASSESSMENT
PAIN_FUNCTIONAL_ASSESSMENT: 0-10
PAIN_FUNCTIONAL_ASSESSMENT: 0-10

## 2024-05-27 ASSESSMENT — PAIN SCALES - GENERAL
PAINLEVEL_OUTOF10: 5 - MODERATE PAIN
PAINLEVEL_OUTOF10: 5 - MODERATE PAIN

## 2024-05-27 ASSESSMENT — PAIN DESCRIPTION - ORIENTATION: ORIENTATION: LOWER

## 2024-05-27 NOTE — DISCHARGE SUMMARY
Discharge Diagnosis  Spondylolysis    Issues Requiring Follow-Up  none    Test Results Pending At Discharge  Pending Labs       No current pending labs.            Hospital Course  74 year old woman with history of HTN, HLD, hypothyroidism presenting with radiculopathy     5/24 s/p L3-5 TLIF/decompression with posterolateral instrumented fusion  5/25 s/p uprights with hardware in position  5/27 drain removed      On the day of discharge, the patient was considered stable from a neurosurgical standpoint.          Pertinent Physical Exam At Time of Discharge  Physical Exam  A&Ox3  Face symmetric  Fcx4   5/5  SILT  Incisions c/d/I  Home Medications     Medication List      START taking these medications     cyclobenzaprine 10 mg tablet; Commonly known as: Flexeril; Take 1 tablet   (10 mg) by mouth 3 times a day as needed for muscle spasms.   lidocaine 4 % patch; Place 2 patches over 12 hours on the skin once   daily. Remove & discard patch within 12 hours or as directed by MD.   oxyCODONE 5 mg immediate release tablet; Commonly known as: Roxicodone;   Take 1 tablet (5 mg) by mouth every 4 hours if needed for severe pain (7 -   10) or moderate pain (4 - 6) for up to 5 days.     CHANGE how you take these medications     acetaminophen 325 mg tablet; Commonly known as: Tylenol; Take 2 tablets   (650 mg) by mouth every 6 hours if needed for mild pain (1 - 3). Please   take every 6 hours for the next 3 days and then take every 6 hours as   needed; What changed: medication strength, how much to take, additional   instructions     CONTINUE taking these medications     atorvastatin 20 mg tablet; Commonly known as: Lipitor; TAKE 1 TABLET BY   MOUTH EVERY DAY   Calcium 600 with Vitamin D3 600 mg-10 mcg (400 unit) chewable tablet;   Generic drug: calcium carbonate-vitamin D3   clindamycin 1 % external solution; Commonly known as: Cleocin T; Apply   topically 2 times a day.   COLLAGEN SKIN RENEWAL ORAL    diphenhydrAMINE-acetaminophen  mg per tablet; Commonly known as:   Tylenol PM   docusate sodium 100 mg capsule; Commonly known as: Colace   levothyroxine 88 mcg tablet; Commonly known as: Synthroid, Levoxyl; Take   1 tablet (88 mcg) by mouth once daily.   lisinopril 10 mg tablet     STOP taking these medications     chlorhexidine 0.12 % solution; Commonly known as: Peridex       Outpatient Follow-Up  Future Appointments   Date Time Provider Department Center   6/27/2024 10:45 AM Demetrius Wright MD [id number]       Petrona Morelos MD

## 2024-05-27 NOTE — CARE PLAN
The patient's goals for the shift include      The clinical goals for the shift include pain control and maintain safety    Problem: Pain  Goal: My pain/discomfort is manageable  Outcome: Met     Problem: Safety  Goal: Patient will be injury free during hospitalization  Outcome: Met  Goal: I will remain free of falls  Outcome: Met     Problem: Daily Care  Goal: Daily care needs are met  Outcome: Met     Problem: Psychosocial Needs  Goal: Demonstrates ability to cope with hospitalization/illness  Outcome: Met  Goal: Collaborate with me, my family, and caregiver to identify my specific goals  Outcome: Met

## 2024-05-27 NOTE — PROGRESS NOTES
Physical Therapy                 Therapy Communication Note    Patient Name: July Castillo  MRN: [id number]  Today's Date: 5/27/2024     Discipline: Physical Therapy    Missed Visit Reason: Missed Visit Reason:  (pt stated shes getting around without an AD reports no acute PT needs and that she will be discharging today. RN present in room)    Missed Time: Attempt    Comment:

## 2024-05-27 NOTE — CARE PLAN
Problem: Pain  Goal: My pain/discomfort is manageable  Outcome: Progressing   The patient's goals for the shift include      The clinical goals for the shift include Patients pain will be managed this shift

## 2024-05-27 NOTE — DISCHARGE INSTR - APPOINTMENTS
HOME CARE INFORMATION  YOUR HOME CARE WILL BE PROVIDED BY [facility] [phone].  THEY WILL CALL YOU TO SCHEDULE FIRST VISIT WITHIN 48 HOURS OF DISCHARGE.   THEY WILL BE PROVIDING PT AND OT.

## 2024-05-27 NOTE — PROGRESS NOTES
05/27/24 1204   Discharge Planning   Who is requesting discharge planning? Patient   Home or Post Acute Services In home services   Type of Home Care Services Home OT;Home PT   Patient expects to be discharged to: Home   Does the patient need discharge transport arranged? No     5/27/24 1204  [facility] is unable to accommodate patient for a SOC within 48 hours.  Additional referrals sent out.  [facility] is able to accept with a SOC within 48 hrs.  AVS and HCO sent to [facility].  Updated patient and she is agreeable to plan.  Rachel Orona RN [facility]

## 2024-05-28 NOTE — OP NOTE
L3-5 Transforaminal Lumbar Interbody Fusion; Bilateral Lumbar Laminectomies; Right Side Facetectomies (B) Operative Note     Date: 2024  OR Location: [facility] [place] [facility] OR    Name: July Castillo, : 1950, Age: 74 y.o., MRN: [id number], Sex: female    Diagnosis  Pre-op Diagnosis     * Lumbosacral spondylosis with radiculopathy [M47.27]     * Acquired spondylolisthesis of lumbosacral region [M43.17] Post-op Diagnosis     * Lumbosacral spondylosis with radiculopathy [M47.27]     * Acquired spondylolisthesis of lumbosacral region [M43.17]     Procedures  L3-5 Transforaminal Lumbar Interbody Fusion; Bilateral Lumbar Laminectomies; Right Side Facetectomies  26184 - IL ARTHRODESIS COMBINED TQ 1NTRSPC LUMBAR    IL ARTHRODESIS CMBN TQ 1NTRSPC EACH ADDITIONAL [04008]  IL RIVERA FACETEC/FORAMOT DRG ARTHRD LUMBAR 1 VRT SGM [75749]  IL RIVERA FACETEC/FORAMOT DRG ARTHRD LMBR EA ADDL SGM [06363]  IL POSTERIOR SEGMENTAL INSTRUMENTATION 3-6 VRT SEG [93210]  IL INSJ BIOMCHN DEV INTERVERTEBRAL DSC SPC W/ARTHRD [04494]  IL AUTOGRAFT SPINE SURGERY LOCAL FROM SAME INCISION [29073]  Surgeons      * Demetrius Ray - Primary    Resident/Fellow/Other Assistant:  Surgeons and Role:     * Natan Roberts MD - Resident - Assisting    Procedure Summary  Anesthesia: General  ASA: III  Anesthesia Staff: Anesthesiologist: Kate Lazo MD  CRNA: NORAH Gutierrez-CRNA  Estimated Blood Loss: 500 mL  Intra-op Medications: Administrations occurring from 0730 to 1130 on 24:  * No intraprocedure medications in log *           Anesthesia Record               Intraprocedure I/O Totals          Intake    LR bolus 1500.00 mL    Total Intake 1500 mL       Output    Urine 300 mL    Est. Blood Loss 500 mL    Total Output 800 mL       Net    Net Volume 700 mL          Specimen: No specimens collected     Staff:   Circulator: Haley  Scrub Person: Jose  Relief Scrub: Marisa Islas Circulator: Alise         Drains and/or Catheters:   [REMOVED]  Closed/Suction Drain Inferior Back Accordion 10 Fr. (Removed)   Site Description Healing 05/26/24 2100   Dressing Status Clean;Dry;Occlusive 05/26/24 2100   Drainage Appearance Bloody 05/25/24 0900   Status To bulb suction 05/25/24 0900   Output (mL) 5 mL 05/26/24 1800       [REMOVED] Urethral Catheter Coude;Non-latex 14 Fr. (Removed)       Tourniquet Times:         Implants:  Implants       Type Name Action Serial No.      Graft BONE MATRIX, OSTEOCEL PRO CELLULAR, MEDIUM - [id number] - [id number] Implanted [id number]     Screw Modulus TLIP O [id number] 8degree Implanted N/A      8MM X 10MM X 25MM MODULUS SPINAL FUSION CAGE, 4DEG, TLIF-O Implanted      Screw SCREW, RELINE-O, 6.5X45MM 2S POLYAXIAL - SN/A - [id number] Implanted N/A     Neuro Interventional Implant SCREW, RELINE LOCK, 5.5MM OPEN TULIP - SN/A - [id number] Implanted N/A     Screw DARLEEN, RELINE-O, 5.5 X 60MM, LORDOTIC - SN/A - [id number] Implanted N/A              Findings: see procedure details    Indications: July Castillo is an 74 y.o. female who is having surgery for Lumbosacral spondylosis with radiculopathy [M47.27]  Acquired spondylolisthesis of lumbosacral region [M43.17].     The patient was seen in the preoperative area. The risks, benefits, complications, treatment options, non-operative alternatives, expected recovery and outcomes were discussed with the patient. The possibilities of reaction to medication, pulmonary aspiration, injury to surrounding structures, bleeding, recurrent infection, the need for additional procedures, failure to diagnose a condition, and creating a complication requiring transfusion or operation were discussed with the patient. The patient concurred with the proposed plan, giving informed consent.  The site of surgery was properly noted/marked if necessary per policy. The patient has been actively warmed in preoperative area. Preoperative antibiotics have been ordered and given within 1 hours of incision. Venous  thrombosis prophylaxis have been ordered including bilateral sequential compression devices    Procedure Details:   After informed consent was obtained, the patient w 6.5 mm pedicle screws were placed bilaterally at L3, L4, and L5.  As brought back to the operating room and general anesthesia induced by the anesthesia team.  The patient was then flipped prone onto a 4 post Pepe table.  Using intraoperative fluoroscopy, a midline incision was planned centered over the L3-4 and L4-5 disc spaces.  This area was prepped and draped in the usual sterile fashion.  Incision was made with 10 blade.  Subdermal dissection was done with monopolar cautery.  The fascia was coagulated and cut.  A submuscular dissection was done over the spinous process lamina and facet joints bilaterally at L3-4 and L4-5.  The L2 spinous process was exposed as well.  Intraoperative fluoroscopy was used to confirm the correct levels.  A spinous process clamp for intraoperative spinal navigation was placed on the L2 spinous process and a star for navigation was attached.  The [employer] O-arm was brought in and a spin was done.  The thin cut CT images were transferred to the [employer].  The instruments were registered multiple anatomic landmarks were used to check the accuracy of the registration.  Next, using a 5.5 mm navigated awl tip tap, 6.5 mm pedicle screws were placed bilaterally at L3, L4, and L5.  Intraoperative fluoroscopy showed the pedicle screws to be in excellent position.  Next, using a combination of the high-speed matchstick drill, Leksell rongeurs, curettes, and Kerrison rongeurs, a L3, L4, and top of L5 laminectomy was performed.  Next, using the high-speed matchstick drill, curettes, and Kerrison rongeurs, a right-sided L3-4 facetectomy was performed.  The bone from the laminectomy and facetectomy was made of soft tissue and morselized using a bone mill then combined with Osteocel morselized allograft bone fusion promote  of material.  Next, pedicle base distraction was performed at the L3-4 level and a L3-4 discectomy was performed in a transforaminal fashion with the L4 nerve root shoulder retracted medially.  The disc capsule was cut and a series of shonna, curettes, and pituitary rongeurs were used to do a thorough discectomy and prepare the endplates for interbody arthrodesis at this level.  A funnel was used to place the mixture of morselized bone graft into the interbody space for interbody arthrodesis.  Next, a 8 mm titanium cage 8 degrees of lordosis was packed with the bone graft and tapped into place using intraoperative fluoroscopy.  The distraction was released.  We then moved onto the next level.  A right L4-5 facetectomy was then performed in a similar fascia followed by transforaminal L4-5 discectomy.  Additional morselized bone graft was placed in this L4-5 interbody space for interbody arthrodesis followed by a 8 mm cage with 4 degrees of lordosis that was filled with the bone graft as well and tapped into place.  Distraction was released.  Rods were placed bilaterally followed by setscrews and final tightening.  The L4-L5 anterolisthesis was nicely reduced and final tightening was done with compression on the right side at L3-4 and L4-5.  Xrays showed all hardware to be in good position and good reduction of L4-5 spondylolisthesis. The wound was copiously irrigated with antibiotic saline.  On the left side, the L3-4 and L4-5 facet joints were decorticated with using the high-speed matchstick drill and additional morselized bone graft was placed in the area for posterolateral arthrodesis.  A 10 round subfascial drain was placed and secured to the skin with a 2-0 silk suture.  The wound was then closed in layers with interrupted 0 Vicryl and 2 simple sutures.  The skin was closed with a running 4-0 Monocryl subcuticular stitch.  The wound was washed, dried, and Exofin fusion was used over this area.  After this  dried, the patient was flipped supine onto her bed and handed over the anesthesia team.  She was extubated and taken to their recovery area in stable condition.    Complications:  None; patient tolerated the procedure well.    Disposition: PACU - hemodynamically stable.  Condition: stable       Attending Attestation: I was present and scrubbed for the entire procedure.    Demetrius Wright  Phone Number: [phone]

## 2024-05-29 DIAGNOSIS — M47.27 LUMBOSACRAL SPONDYLOSIS WITH RADICULOPATHY: ICD-10-CM

## 2024-05-29 DIAGNOSIS — M43.17 ACQUIRED SPONDYLOLISTHESIS OF LUMBOSACRAL REGION: ICD-10-CM

## 2024-05-29 DIAGNOSIS — Z98.1 S/P LUMBAR SPINAL FUSION: ICD-10-CM

## 2024-05-30 ENCOUNTER — TELEPHONE (OUTPATIENT)
Dept: INPATIENT UNIT | Facility: HOSPITAL | Age: 74
End: 2024-05-30
Payer: MEDICARE

## 2024-06-26 NOTE — PROGRESS NOTES
July Castillo is an extremely nice 74 year old woman who presents for neurosurgical follow-up s/p right L3-5 facetectomies, combined posterolateral and transforaminal interbody instrumented fusion (TLIF) 5/24/24.  She is doing remarkably well with 100% improvement of her radiating leg pain, numbness, and tingling.  She really has no significant postoperative pain anymore and has not used any pain medications in several weeks.  She has full strength on exam.  Her incision has healed beautifully.  Upright x-rays of the lumbar spine shows hardware in good position and stable alignment.  She is planning to travel to [place] in October and I would like to see her before then with repeat upright x-rays of the lumbar spine at that time.    Demetrius Wright MD

## 2024-06-27 ENCOUNTER — APPOINTMENT (OUTPATIENT)
Dept: NEUROSURGERY | Facility: CLINIC | Age: 74
End: 2024-06-27
Payer: MEDICARE

## 2024-06-27 ENCOUNTER — HOSPITAL ENCOUNTER (OUTPATIENT)
Dept: RADIOLOGY | Facility: HOSPITAL | Age: 74
Discharge: HOME | End: 2024-06-27
Payer: MEDICARE

## 2024-06-27 VITALS
SYSTOLIC BLOOD PRESSURE: 172 MMHG | WEIGHT: 142 LBS | BODY MASS INDEX: 25.16 KG/M2 | HEART RATE: 79 BPM | DIASTOLIC BLOOD PRESSURE: 84 MMHG | HEIGHT: 63 IN

## 2024-06-27 DIAGNOSIS — M48.061 SPINAL STENOSIS OF LUMBAR REGION, UNSPECIFIED WHETHER NEUROGENIC CLAUDICATION PRESENT: ICD-10-CM

## 2024-06-27 DIAGNOSIS — Z98.1 STATUS POST LUMBAR SPINAL FUSION: ICD-10-CM

## 2024-06-27 PROCEDURE — 1157F ADVNC CARE PLAN IN RCRD: CPT | Performed by: NEUROLOGICAL SURGERY

## 2024-06-27 PROCEDURE — 1126F AMNT PAIN NOTED NONE PRSNT: CPT | Performed by: NEUROLOGICAL SURGERY

## 2024-06-27 PROCEDURE — 99024 POSTOP FOLLOW-UP VISIT: CPT | Performed by: NEUROLOGICAL SURGERY

## 2024-06-27 PROCEDURE — 3077F SYST BP >= 140 MM HG: CPT | Performed by: NEUROLOGICAL SURGERY

## 2024-06-27 PROCEDURE — 72100 X-RAY EXAM L-S SPINE 2/3 VWS: CPT | Performed by: RADIOLOGY

## 2024-06-27 PROCEDURE — 3079F DIAST BP 80-89 MM HG: CPT | Performed by: NEUROLOGICAL SURGERY

## 2024-06-27 PROCEDURE — 72100 X-RAY EXAM L-S SPINE 2/3 VWS: CPT

## 2024-06-27 ASSESSMENT — PAIN SCALES - GENERAL: PAINLEVEL: 0-NO PAIN

## 2024-09-18 NOTE — PROGRESS NOTES
July Castillo is an extremely nice 74 year old woman who presents for neurosurgical follow-up s/p right L3-5 facetectomies, combined posterolateral and transforaminal interbody instrumented fusion (TLIF) 5/24/24.  She continues to do remarkably well with 0 pain.  She has full strength everywhere on exam and her incision has healed nicely.  Upright x-rays of lumbar spine shows hardware in good position and stable alignment.  She is traveling to [facility] next month and is more and more active.  I will see her again in 6 months.    Demetrius Wright MD    Prep Time On Date of the Patient Encounter:    5 Minutes  Time Directly with Patient/Family/Caregiver:    10 Minutes  Additional Time Spent on Patient Care Activities:   0 Minutes  Documentation Time:       5 Minutes  Other Time Spent:       0 Minutes    Details of Other Time Spent:      Total Time on Date of Patient Encounter:    20 Minutes

## 2024-09-19 ENCOUNTER — APPOINTMENT (OUTPATIENT)
Dept: NEUROSURGERY | Facility: CLINIC | Age: 74
End: 2024-09-19
Payer: MEDICARE

## 2024-09-19 ENCOUNTER — HOSPITAL ENCOUNTER (OUTPATIENT)
Dept: RADIOLOGY | Facility: HOSPITAL | Age: 74
Discharge: HOME | End: 2024-09-19
Payer: MEDICARE

## 2024-09-19 VITALS
HEART RATE: 73 BPM | DIASTOLIC BLOOD PRESSURE: 91 MMHG | WEIGHT: 140 LBS | BODY MASS INDEX: 23.9 KG/M2 | SYSTOLIC BLOOD PRESSURE: 158 MMHG | HEIGHT: 64 IN

## 2024-09-19 DIAGNOSIS — M47.816 LUMBAR SPONDYLOSIS: ICD-10-CM

## 2024-09-19 DIAGNOSIS — Z98.1 STATUS POST LUMBAR SPINAL FUSION: ICD-10-CM

## 2024-09-19 PROCEDURE — 72100 X-RAY EXAM L-S SPINE 2/3 VWS: CPT | Performed by: RADIOLOGY

## 2024-09-19 PROCEDURE — 3080F DIAST BP >= 90 MM HG: CPT | Performed by: NEUROLOGICAL SURGERY

## 2024-09-19 PROCEDURE — 1036F TOBACCO NON-USER: CPT | Performed by: NEUROLOGICAL SURGERY

## 2024-09-19 PROCEDURE — 99213 OFFICE O/P EST LOW 20 MIN: CPT | Performed by: NEUROLOGICAL SURGERY

## 2024-09-19 PROCEDURE — 72100 X-RAY EXAM L-S SPINE 2/3 VWS: CPT

## 2024-09-19 PROCEDURE — 1126F AMNT PAIN NOTED NONE PRSNT: CPT | Performed by: NEUROLOGICAL SURGERY

## 2024-09-19 PROCEDURE — 3077F SYST BP >= 140 MM HG: CPT | Performed by: NEUROLOGICAL SURGERY

## 2024-09-19 PROCEDURE — 1157F ADVNC CARE PLAN IN RCRD: CPT | Performed by: NEUROLOGICAL SURGERY

## 2024-09-19 PROCEDURE — 3008F BODY MASS INDEX DOCD: CPT | Performed by: NEUROLOGICAL SURGERY

## 2024-09-19 PROCEDURE — 1159F MED LIST DOCD IN RCRD: CPT | Performed by: NEUROLOGICAL SURGERY

## 2024-09-19 ASSESSMENT — PAIN SCALES - GENERAL: PAINLEVEL: 0-NO PAIN

## 2024-09-19 ASSESSMENT — ENCOUNTER SYMPTOMS
LOSS OF SENSATION IN FEET: 0
DEPRESSION: 0
OCCASIONAL FEELINGS OF UNSTEADINESS: 0

## 2024-09-23 ENCOUNTER — OFFICE VISIT (OUTPATIENT)
Dept: PRIMARY CARE | Facility: CLINIC | Age: 74
End: 2024-09-23
Payer: MEDICARE

## 2024-09-23 VITALS
HEART RATE: 65 BPM | BODY MASS INDEX: 24.55 KG/M2 | TEMPERATURE: 97.9 F | DIASTOLIC BLOOD PRESSURE: 88 MMHG | RESPIRATION RATE: 18 BRPM | HEIGHT: 64 IN | SYSTOLIC BLOOD PRESSURE: 148 MMHG | OXYGEN SATURATION: 99 % | WEIGHT: 143.8 LBS

## 2024-09-23 DIAGNOSIS — E78.5 HYPERLIPIDEMIA, UNSPECIFIED HYPERLIPIDEMIA TYPE: ICD-10-CM

## 2024-09-23 DIAGNOSIS — I10 WHITE COAT SYNDROME WITH HYPERTENSION: ICD-10-CM

## 2024-09-23 DIAGNOSIS — Z12.83 SCREENING FOR SKIN CANCER: ICD-10-CM

## 2024-09-23 DIAGNOSIS — I10 HYPERTENSION, UNSPECIFIED TYPE: Primary | ICD-10-CM

## 2024-09-23 LAB
CHOLEST SERPL-MCNC: 180 MG/DL (ref 0–199)
CHOLEST/HDLC SERPL: 2 {RATIO}
HDLC SERPL-MCNC: 89.9 MG/DL
LDLC SERPL CALC-MCNC: 77 MG/DL
NON HDL CHOLESTEROL: 90 MG/DL (ref 0–149)
TRIGL SERPL-MCNC: 68 MG/DL (ref 0–149)
VLDL: 14 MG/DL (ref 0–40)

## 2024-09-23 PROCEDURE — 36415 COLL VENOUS BLD VENIPUNCTURE: CPT | Performed by: NURSE PRACTITIONER

## 2024-09-23 PROCEDURE — 3079F DIAST BP 80-89 MM HG: CPT | Performed by: NURSE PRACTITIONER

## 2024-09-23 PROCEDURE — 1036F TOBACCO NON-USER: CPT | Performed by: NURSE PRACTITIONER

## 2024-09-23 PROCEDURE — 3008F BODY MASS INDEX DOCD: CPT | Performed by: NURSE PRACTITIONER

## 2024-09-23 PROCEDURE — 1123F ACP DISCUSS/DSCN MKR DOCD: CPT | Performed by: NURSE PRACTITIONER

## 2024-09-23 PROCEDURE — 1126F AMNT PAIN NOTED NONE PRSNT: CPT | Performed by: NURSE PRACTITIONER

## 2024-09-23 PROCEDURE — 3077F SYST BP >= 140 MM HG: CPT | Performed by: NURSE PRACTITIONER

## 2024-09-23 PROCEDURE — 99213 OFFICE O/P EST LOW 20 MIN: CPT | Performed by: NURSE PRACTITIONER

## 2024-09-23 PROCEDURE — 1157F ADVNC CARE PLAN IN RCRD: CPT | Performed by: NURSE PRACTITIONER

## 2024-09-23 PROCEDURE — 1159F MED LIST DOCD IN RCRD: CPT | Performed by: NURSE PRACTITIONER

## 2024-09-23 PROCEDURE — 1158F ADVNC CARE PLAN TLK DOCD: CPT | Performed by: NURSE PRACTITIONER

## 2024-09-23 PROCEDURE — 80061 LIPID PANEL: CPT | Performed by: NURSE PRACTITIONER

## 2024-09-23 ASSESSMENT — LIFESTYLE VARIABLES
HOW MANY STANDARD DRINKS CONTAINING ALCOHOL DO YOU HAVE ON A TYPICAL DAY: 1 OR 2
HOW OFTEN DO YOU HAVE SIX OR MORE DRINKS ON ONE OCCASION: NEVER
HOW OFTEN DO YOU HAVE A DRINK CONTAINING ALCOHOL: 2-4 TIMES A MONTH
AUDIT-C TOTAL SCORE: 2
SKIP TO QUESTIONS 9-10: 1

## 2024-09-23 ASSESSMENT — ENCOUNTER SYMPTOMS: HYPERTENSION: 1

## 2024-09-23 ASSESSMENT — PATIENT HEALTH QUESTIONNAIRE - PHQ9
SUM OF ALL RESPONSES TO PHQ9 QUESTIONS 1 AND 2: 0
1. LITTLE INTEREST OR PLEASURE IN DOING THINGS: NOT AT ALL
2. FEELING DOWN, DEPRESSED OR HOPELESS: NOT AT ALL

## 2024-09-23 ASSESSMENT — PAIN SCALES - GENERAL: PAINLEVEL: 0-NO PAIN

## 2024-09-26 ENCOUNTER — APPOINTMENT (OUTPATIENT)
Dept: NEUROSURGERY | Facility: CLINIC | Age: 74
End: 2024-09-26
Payer: MEDICARE

## 2024-10-11 ENCOUNTER — APPOINTMENT (OUTPATIENT)
Dept: PHYSICAL MEDICINE AND REHAB | Facility: CLINIC | Age: 74
End: 2024-10-11
Payer: MEDICARE

## 2024-10-22 DIAGNOSIS — I10 HYPERTENSION, UNSPECIFIED TYPE: ICD-10-CM

## 2024-10-24 RX ORDER — LISINOPRIL 10 MG/1
10 TABLET ORAL DAILY
Qty: 90 TABLET | Refills: 1 | Status: SHIPPED | OUTPATIENT
Start: 2024-10-24

## 2024-11-11 ENCOUNTER — APPOINTMENT (OUTPATIENT)
Dept: ORTHOPEDIC SURGERY | Facility: CLINIC | Age: 74
End: 2024-11-11
Payer: MEDICARE

## 2024-11-11 DIAGNOSIS — G56.01 CARPAL TUNNEL SYNDROME ON RIGHT: ICD-10-CM

## 2024-11-11 PROCEDURE — 1160F RVW MEDS BY RX/DR IN RCRD: CPT | Performed by: ORTHOPAEDIC SURGERY

## 2024-11-11 PROCEDURE — 1036F TOBACCO NON-USER: CPT | Performed by: ORTHOPAEDIC SURGERY

## 2024-11-11 PROCEDURE — 1159F MED LIST DOCD IN RCRD: CPT | Performed by: ORTHOPAEDIC SURGERY

## 2024-11-11 PROCEDURE — 99203 OFFICE O/P NEW LOW 30 MIN: CPT | Performed by: ORTHOPAEDIC SURGERY

## 2024-11-11 PROCEDURE — 1157F ADVNC CARE PLAN IN RCRD: CPT | Performed by: ORTHOPAEDIC SURGERY

## 2024-11-11 PROCEDURE — 1123F ACP DISCUSS/DSCN MKR DOCD: CPT | Performed by: ORTHOPAEDIC SURGERY

## 2024-11-11 PROCEDURE — L3908 WHO COCK-UP NONMOLDE PRE OTS: HCPCS | Performed by: ORTHOPAEDIC SURGERY

## 2024-11-11 ASSESSMENT — PAIN - FUNCTIONAL ASSESSMENT: PAIN_FUNCTIONAL_ASSESSMENT: 0-10

## 2024-11-11 ASSESSMENT — ENCOUNTER SYMPTOMS
FEVER: 0
NECK STIFFNESS: 1
FATIGUE: 0
BRUISES/BLEEDS EASILY: 0
CHILLS: 0
NUMBNESS: 1
ARTHRALGIAS: 1
SHORTNESS OF BREATH: 0
WHEEZING: 0

## 2024-11-11 ASSESSMENT — PAIN SCALES - GENERAL: PAINLEVEL_OUTOF10: 0 - NO PAIN

## 2024-11-11 NOTE — PROGRESS NOTES
Reason for Appointment  Chief Complaint   Patient presents with    Right Wrist - Numbness     History of Present Illness  New patient is a 74 y.o. female here today for evaluation of numbness in the right wrist. PSHx includes L3-5 Transforaminal Lumbar Interbody Fusion; Bilateral Lumbar Laminectomies; Right Side Facetectomies on 5/24/24. PMHx includes HTN, Hyperlipidemia. Today she states the numbness started a few years ago, she sleeps on her side, wakes her up at night, she does have to shake to wake the hands. She does get symptoms writing and using her cell phone. She is getting classic carpal tunnel symptoms. Past medical history allergies medications social and family history all reviewed.       Past Medical History:   Diagnosis Date    Allergic 2010    Arthritis 2010    Cervical disc disorder Year 2000    Disease of thyroid gland 2010    Heart murmur Child    Hyperlipidemia     Hypertension 2010    Hypothyroidism     Low back pain 2000    Lumbosacral disc disease 2000    Lumbosacral spondylosis with radiculopathy     Neck pain 2015    Osteopenia     Vertigo     remote history       Past Surgical History:   Procedure Laterality Date    COLONOSCOPY  06/22/2022    SPINAL FUSION  05/24/2024    TUMOR EXCISION  2005    Pleomorphic adenoma of parotid    WISDOM TOOTH EXTRACTION  1980       Medication Documentation Review Audit       Reviewed by Екатерина Rosen MA (Medical Assistant) on 11/11/24 at 1007      Medication Order Taking? Sig Documenting Provider Last Dose Status   ascorbic acid/collagen hydr (COLLAGEN SKIN RENEWAL ORAL) [id number] No Take 1 Dose by mouth once daily. gummy Historical Provider, MD Not Taking Active   atorvastatin (Lipitor) 20 mg tablet [id number] Yes TAKE 1 TABLET BY MOUTH EVERY DAY Concepcion Hess, APRN-CNP Taking Active   calcium carbonate-vitamin D3 (Calcium 600 with Vitamin D3) 600 mg-10 mcg (400 unit) chewable tablet [id number] Yes Chew 1 tablet once daily. Historical Provider, MD Taking  Active   clindamycin (Cleocin T) 1 % external solution [id number] Yes Apply topically 2 times a day. NORAH Gibbons-CNP Taking Active   cyclobenzaprine (Flexeril) 10 mg tablet [id number] No Take 1 tablet (10 mg) by mouth 3 times a day as needed for muscle spasms.   Patient not taking: Reported on 2024    Petrona Morelos MD Not Taking  24 8750   diphenhydrAMINE-acetaminophen (Tylenol PM)  mg per tablet [id number] Yes Take 1 tablet by mouth as needed at bedtime for sleep. Historical Provider, MD Taking Active   docusate sodium (Colace) 100 mg capsule [id number] Yes Take 1 capsule (100 mg) by mouth once daily at bedtime. Historical Provider, MD Taking Active   levothyroxine (Synthroid, Levoxyl) 88 mcg tablet [id number] Yes Take 1 tablet (88 mcg) by mouth once daily. NORAH Curran-CNP Taking Active   lisinopril 10 mg tablet [id number] Yes TAKE 1 TABLET BY MOUTH EVERY DAY Concepcion Hess APRN-CNP  Active                    Allergies   Allergen Reactions    Ibuprofen Swelling     Facial-lips, eyes    Codeine Nausea/vomiting    Lactose Other     Sensitivity    Mold Other     sneezing    Naproxen Hives and Swelling       Review of Systems   Constitutional:  Negative for chills, fatigue and fever.   Respiratory:  Negative for shortness of breath and wheezing.    Cardiovascular:  Negative for chest pain and leg swelling.   Musculoskeletal:  Positive for arthralgias and neck stiffness.   Allergic/Immunologic: Negative for immunocompromised state.   Neurological:  Positive for numbness.   Hematological:  Does not bruise/bleed easily.       Exam   Pt is alert awake, orientated to person place and time. No acute distress. Mood is good.   Cervical stiffness. Stiffness in the spine. Thoracic kyphosis No severe trap tenderness. Excellent full shoulder ROM. Excellent cuff strength internal and external. Good biceps triceps cuff strength. Good wrist flexion extension strength. Classic cmc  arthritis. Tender bilateral cmc's. Classic distal joint osteoarthritic. Full composite flexion. Maybe thenar atrophy right greater than the left. Positive tinel's right median nerve region. Positive median nerve compression test.    Assessment   Cmc arthritis  Classic distal joint osteoarthritis  Bilateral carpal tunnel    Plan   We got her carpal tunnel braces and we discussed brace wear. At this point we will get an EMG and NCV on the right wrist to evaluate for carpal tunnel. Follow up after EMG and NCV completion. We dicussed a possible carpal tunnel release in the future.    I, Willow Wallace, attest that this documentation has been prepared under the direction and in the presence of Gabriele Mariscal MD.   By signing below, I, Gabriele Mariscal MD, personally performed the services described in this documentation. All medical record entries made by the scribe were at my direction and in my presence. I have reviewed the chart and agree that the record reflects my personal performance and is accurate and complete.

## 2024-11-11 NOTE — PROGRESS NOTES
[Self-referred]    Chief complaint: bilateral knee pain.     July Castillo is a pleasant 74 y.o. female, with past medical history of HTN, HLD, hypothyroidism, and low back pain, who presents for evaluation of bilateral knee pain.     TIMELINE OF COMPLAINT(S):  Remote right knee fracture treated with bracing.    [facility] Supartz gel injections in both knees - 10/16/23 in [place], [place], 5 weekly injections, worked well until 10/2024. She has undergone 3 series total, each lasting about 1 year.    She is in [place] 12/6-12/15.    She recents underwent a right L3-L5 facetectomy with posterolateral and transforaminal fusion with Dr. Wright on 5/24/24, last note form 9/19 reviewed showing 0 pain and stable hardware with fully healed incision. She was also seen by Dr. Mariscal for right wrist numbness and given a preliminary diagnosis of CTS pending EMG and NCV.  He advised her to wear wrist brace  At night.    Pain:  LOCATION- Bilateral knees, R>L, medial>lateral  RADIATION- no  CONSTANT or INTERMITTENT- Intermittent   SEVERITY/QUANTITY-3-4  QUALITY- aching, pressure, and sharp  WEAKNESS- Yes, left leg  NUMBNESS/TINGLING- Yes, left leg  ASSOCIATED WITH- no buckling, snapping, or catching.  EXACERBATED BY- Over use with kneeling/squatting since she isnt allowed to bend post surgery.  BETTER WITH- Rest  TRIED- Tylenol      Anti-Inflammatories: ibuprofen and naproxen allergies (caused hives/swelling), patient stated she has used voltaren gel without issue.      Muscle relaxants: flexeril, did not help      Anti-depressants:       Neuroleptics:      LDN:    PHYSICAL THERAPY: Yes, in [place]  CHIROPRACTIC MANIPULATION: Yes  TENS unit: Yes  ACUPUNCTURE TREATMENTS: No  DEEP TISSUE MASSAGE THERAPY: Yes  OSTEOPATHIC MANIPULATION THERAPY: No    EMG/NCS: No    INJECTIONS: Yes  Yes - supartz x5, steroid distant only in right knee.    IMAGING: Images and reports personally reviewed interpreted today and discussed with the patient.    === 01/09/24  ===    MR LUMBAR SPINE WO CONTRAST    - Impression -  1. Severe narrowing thecal sac L4/5 in relation to anterolisthesis,  disc, facet and ligamentum flavum hypertrophy.    2. Mild narrowing thecal sac L3/4 as described above    3. Mild posterior disc osteophyte complex L5/S1 with subtle  asymmetric left paracentral disc protrusion abutting the exiting  emerging S1 nerve root.      === 09/19/24 ===    XR LUMBAR SPINE 2-3 VIEWS    - Impression -  Satisfactory appearance L3-L5 fusion.        Left knee 12/18/23 shows medial meniscal calcifications, patellofemoral degeneration.  Lab Results   Component Value Date    HGBA1C 5.3 05/10/2024       FUNCTIONAL HISTORY: The patient is independent in all ADLs, mobility, and driving. The patient does not use any assistive device.    SH:  Lives in: [place]  Lives with: Self  Occupation: Retired  Tobacco: No  Alcohol: Yes, 1-2 glasses a week or none  Drugs: No  __________________________________    ROS: The patient denies any bowel or bladder incontinence/accidents, night sweats, fevers, chills, recent significant weight loss. A 14 point review of systems was reviewed with the patient and is as above and otherwise negative.  ROS questionnaire positive for numbness and tingling in left leg, joint pain    Physical Exam  Constitutional:           Comments: Bilateral knee pain.          PHYSICAL EXAM    GEN - Alert, well-developed, well-nourished, no acute distress  PSYCH - Cooperative, appropriate mood and affect  HEENT - NC/AT  RESP - Non-labored respirations, equal expansion  CV - warm and well-perfused, No cyanosis or edema in extremities.  ABD- soft, ND  SKIN - No rash.    Knee: No warmth or erythema on right, mild warmth and erythema on left, mild effusion bilaterally.  No popliteal fullness.  No anterior, posterior, medial or lateral instability.  pes anserine tenderness mildly on left.  There is no pain with hyperflexion of the knee without weight-bearing except slightly at  the very endpoint of deep knee flexion on the left.  There is no pain with varus or valgus stressing of the knee during flexion and extension.  There is no crepitus.  There is no lateral joint line tenderness but louis and posteromedial jointline is tender bilaterally, R>L. There is no pain with patellar compression bilaterally except slight discomfort on the left    NEURO -   LE strength 5/5 -  including hip flexors, knee flexors, knee extensors, ankle dorsiflexors, ankle plantar flexors, and EHL, bilaterally.  Sensation - intact to light touch in bilateral lower extremities.   Reflexes - 2+ patellar and diminished achilles reflexes bilaterally No Clonus,   GAIT - Normal base, normal stride length, non-antalgic, mild medial collapse at knees and left truncal weakness. Able to toe walk and heel walk without difficulty. Able to perform tandem gait without difficulty.    IMPRESSION:   July is a 74 y.o. female with past medical history of HTN, HLD, hypothyroidism, and low back pain, who presents for evaluation of bilateral knee pain.  The patient's symptoms and physical exam findings are suggestive of osteoarthritis of bilateral knees.  The other possible differential diagnosis(es) include(s):  meniscal calcification, pes anserine syndrome on left could be component of pain.     -Patient Education: Extensive time was spent educating the patient on relevant anatomy, clinical findings and imaging, as well as discussing the potential diagnoses as discussed above.   -Try Voltaren gel on the area of pain 4 times per day for 2 weeks straight and then as needed up to 4 times per day.  This is over-the-counter.   -Consider other medications in the future  -You can schedule the EMG for your right hand with me if you want  -Mathew ordered  -R knee xray ordered  -Follow up for knee gel injections when approved and delivered      The patient expressed understanding and agreement with the assessment and plan. Patient  encouraged to contact us should they have any questions, concerns, or any changes in symptoms.     Thank you for allowing me to participate in the care of your patient.      Ezra Wilkinson DO, MBA, PGY-3  Physical Medicine and Rehabilitation    Patient seen and discussed with the resident. I agree with the above assessment and plan.      ** Dictated with voice recognition software, please forgive any errors in grammar and/or spelling **

## 2024-11-12 ENCOUNTER — APPOINTMENT (OUTPATIENT)
Dept: PHYSICAL MEDICINE AND REHAB | Facility: CLINIC | Age: 74
End: 2024-11-12
Payer: MEDICARE

## 2024-11-12 VITALS
SYSTOLIC BLOOD PRESSURE: 148 MMHG | DIASTOLIC BLOOD PRESSURE: 83 MMHG | WEIGHT: 149 LBS | OXYGEN SATURATION: 100 % | BODY MASS INDEX: 25.44 KG/M2 | HEART RATE: 66 BPM | TEMPERATURE: 97.1 F | HEIGHT: 64 IN

## 2024-11-12 DIAGNOSIS — M25.561 CHRONIC PAIN OF RIGHT KNEE: ICD-10-CM

## 2024-11-12 DIAGNOSIS — M25.561 CHRONIC PAIN OF BOTH KNEES: Primary | ICD-10-CM

## 2024-11-12 DIAGNOSIS — G89.29 CHRONIC PAIN OF BOTH KNEES: Primary | ICD-10-CM

## 2024-11-12 DIAGNOSIS — M17.0 PRIMARY OSTEOARTHRITIS OF BOTH KNEES: ICD-10-CM

## 2024-11-12 DIAGNOSIS — M25.562 CHRONIC PAIN OF BOTH KNEES: Primary | ICD-10-CM

## 2024-11-12 DIAGNOSIS — G89.29 CHRONIC PAIN OF RIGHT KNEE: ICD-10-CM

## 2024-11-12 PROCEDURE — 1157F ADVNC CARE PLAN IN RCRD: CPT | Performed by: PHYSICAL MEDICINE & REHABILITATION

## 2024-11-12 PROCEDURE — 1159F MED LIST DOCD IN RCRD: CPT | Performed by: PHYSICAL MEDICINE & REHABILITATION

## 2024-11-12 PROCEDURE — 1160F RVW MEDS BY RX/DR IN RCRD: CPT | Performed by: PHYSICAL MEDICINE & REHABILITATION

## 2024-11-12 PROCEDURE — 3079F DIAST BP 80-89 MM HG: CPT | Performed by: PHYSICAL MEDICINE & REHABILITATION

## 2024-11-12 PROCEDURE — 3077F SYST BP >= 140 MM HG: CPT | Performed by: PHYSICAL MEDICINE & REHABILITATION

## 2024-11-12 PROCEDURE — 99203 OFFICE O/P NEW LOW 30 MIN: CPT | Performed by: PHYSICAL MEDICINE & REHABILITATION

## 2024-11-12 PROCEDURE — 3008F BODY MASS INDEX DOCD: CPT | Performed by: PHYSICAL MEDICINE & REHABILITATION

## 2024-11-12 PROCEDURE — 1125F AMNT PAIN NOTED PAIN PRSNT: CPT | Performed by: PHYSICAL MEDICINE & REHABILITATION

## 2024-11-12 PROCEDURE — 1123F ACP DISCUSS/DSCN MKR DOCD: CPT | Performed by: PHYSICAL MEDICINE & REHABILITATION

## 2024-11-12 RX ORDER — SODIUM HYALURONATE INTRA-ARTICULAR SOLN PREF SYR 25 MG/2.5ML 25/2.5 MG/ML
SOLUTION PREFILLED SYRINGE INTRAARTICULAR
Qty: 20 ML | Refills: 0 | Status: SHIPPED | OUTPATIENT
Start: 2024-11-12

## 2024-11-12 ASSESSMENT — PAIN SCALES - GENERAL: PAINLEVEL_OUTOF10: 4

## 2024-11-12 NOTE — LETTER
November 12, 2024     Concepcion Hess, APRN-CNP  [address]  [address]  [facility] [address]    Patient: July Castillo   YOB: 1950   Date of Visit: 11/12/2024       Dear Dr. Concepcion Hess, APRN-CNP:    Thank you for referring July Castillo to me for evaluation. Below are my notes for this consultation.  If you have questions, please do not hesitate to call me. I look forward to following your patient along with you.       Sincerely,     Rachel Rojo MD      CC: No Recipients  ______________________________________________________________________________________    [Self-referred]    Chief complaint: bilateral knee pain.     July Castillo is a pleasant 74 y.o. female, with past medical history of HTN, HLD, hypothyroidism, and low back pain, who presents for evaluation of bilateral knee pain.     TIMELINE OF COMPLAINT(S):  Remote right knee fracture treated with bracing.    [facility] Supartz gel injections in both knees - 10/16/23 in [place], [place], 5 weekly injections, worked well until 10/2024. She has undergone 3 series total, each lasting about 1 year.    She is in [place] 12/6-12/15.    She recents underwent a right L3-L5 facetectomy with posterolateral and transforaminal fusion with Dr. Wright on 5/24/24, last note form 9/19 reviewed showing 0 pain and stable hardware with fully healed incision. She was also seen by Dr. Mariscal for right wrist numbness and given a preliminary diagnosis of CTS pending EMG and NCV.  He advised her to wear wrist brace  At night.    Pain:  LOCATION- Bilateral knees, R>L, medial>lateral  RADIATION- no  CONSTANT or INTERMITTENT- Intermittent   SEVERITY/QUANTITY-3-4  QUALITY- aching, pressure, and sharp  WEAKNESS- Yes, left leg  NUMBNESS/TINGLING- Yes, left leg  ASSOCIATED WITH- no buckling, snapping, or catching.  EXACERBATED BY- Over use with kneeling/squatting since she isnt allowed to bend post surgery.  BETTER WITH- Rest  TRIED- Tylenol      Anti-Inflammatories:  ibuprofen and naproxen allergies (caused hives/swelling), patient stated she has used voltaren gel without issue.      Muscle relaxants: flexeril, did not help      Anti-depressants:       Neuroleptics:      LDN:    PHYSICAL THERAPY: Yes, in [place]  CHIROPRACTIC MANIPULATION: Yes  TENS unit: Yes  ACUPUNCTURE TREATMENTS: No  DEEP TISSUE MASSAGE THERAPY: Yes  OSTEOPATHIC MANIPULATION THERAPY: No    EMG/NCS: No    INJECTIONS: Yes  Yes - supartz x5, steroid distant only in right knee.    IMAGING: Images and reports personally reviewed interpreted today and discussed with the patient.    === 01/09/24 ===    MR LUMBAR SPINE WO CONTRAST    - Impression -  1. Severe narrowing thecal sac L4/5 in relation to anterolisthesis,  disc, facet and ligamentum flavum hypertrophy.    2. Mild narrowing thecal sac L3/4 as described above    3. Mild posterior disc osteophyte complex L5/S1 with subtle  asymmetric left paracentral disc protrusion abutting the exiting  emerging S1 nerve root.      === 09/19/24 ===    XR LUMBAR SPINE 2-3 VIEWS    - Impression -  Satisfactory appearance L3-L5 fusion.        Left knee 12/18/23 shows medial meniscal calcifications, patellofemoral degeneration.  Lab Results   Component Value Date    HGBA1C 5.3 05/10/2024       FUNCTIONAL HISTORY: The patient is independent in all ADLs, mobility, and driving. The patient does not use any assistive device.    SH:  Lives in: [place]  Lives with: Self  Occupation: Retired  Tobacco: No  Alcohol: Yes, 1-2 glasses a week or none  Drugs: No  __________________________________    ROS: The patient denies any bowel or bladder incontinence/accidents, night sweats, fevers, chills, recent significant weight loss. A 14 point review of systems was reviewed with the patient and is as above and otherwise negative.  ROS questionnaire positive for numbness and tingling in left leg, joint pain    Physical Exam  Constitutional:           Comments: Bilateral knee pain.           PHYSICAL EXAM    GEN - Alert, well-developed, well-nourished, no acute distress  PSYCH - Cooperative, appropriate mood and affect  HEENT - NC/AT  RESP - Non-labored respirations, equal expansion  CV - warm and well-perfused, No cyanosis or edema in extremities.  ABD- soft, ND  SKIN - No rash.    Knee: No warmth or erythema on right, mild warmth and erythema on left, mild effusion bilaterally.  No popliteal fullness.  No anterior, posterior, medial or lateral instability.  pes anserine tenderness mildly on left.  There is no pain with hyperflexion of the knee without weight-bearing except slightly at the very endpoint of deep knee flexion on the left.  There is no pain with varus or valgus stressing of the knee during flexion and extension.  There is no crepitus.  There is no lateral joint line tenderness but louis and posteromedial jointline is tender bilaterally, R>L. There is no pain with patellar compression bilaterally except slight discomfort on the left    NEURO -   LE strength 5/5 -  including hip flexors, knee flexors, knee extensors, ankle dorsiflexors, ankle plantar flexors, and EHL, bilaterally.  Sensation - intact to light touch in bilateral lower extremities.   Reflexes - 2+ patellar and diminished achilles reflexes bilaterally No Clonus,   GAIT - Normal base, normal stride length, non-antalgic, mild medial collapse at knees and left truncal weakness. Able to toe walk and heel walk without difficulty. Able to perform tandem gait without difficulty.    IMPRESSION:   July is a 74 y.o. female with past medical history of HTN, HLD, hypothyroidism, and low back pain, who presents for evaluation of bilateral knee pain.  The patient's symptoms and physical exam findings are suggestive of osteoarthritis of bilateral knees.  The other possible differential diagnosis(es) include(s):  meniscal calcification, pes anserine syndrome on left could be component of pain.     -Patient Education: Extensive time  was spent educating the patient on relevant anatomy, clinical findings and imaging, as well as discussing the potential diagnoses as discussed above.   -Try Voltaren gel on the area of pain 4 times per day for 2 weeks straight and then as needed up to 4 times per day.  This is over-the-counter.   -Consider other medications in the future  -You can schedule the EMG for your right hand with me if you want  -Mathew ordered  -R knee xray ordered  -Follow up for knee gel injections when approved and delivered      The patient expressed understanding and agreement with the assessment and plan. Patient encouraged to contact us should they have any questions, concerns, or any changes in symptoms.     Thank you for allowing me to participate in the care of your patient.      Ezra Wilkinson DO, MBA, PGY-3  Physical Medicine and Rehabilitation    Patient seen and discussed with the resident. I agree with the above assessment and plan.      ** Dictated with voice recognition software, please forgive any errors in grammar and/or spelling **

## 2024-11-12 NOTE — PATIENT INSTRUCTIONS
-Try Voltaren gel on the area of pain 4 times per day for 2 weeks straight and then as needed up to 4 times per day.  This is over-the-counter.   -Consider other medications in the future  -You can schedule the EMG for your right hand with me if you want  -Mathew ordered  -R knee xray ordered  -Follow up for knee gel injections when approved and delivered

## 2024-11-13 ENCOUNTER — SPECIALTY PHARMACY (OUTPATIENT)
Dept: PHARMACY | Facility: CLINIC | Age: 74
End: 2024-11-13

## 2024-11-14 DIAGNOSIS — E78.5 HYPERLIPIDEMIA, UNSPECIFIED HYPERLIPIDEMIA TYPE: ICD-10-CM

## 2024-11-14 RX ORDER — ATORVASTATIN CALCIUM 20 MG/1
20 TABLET, FILM COATED ORAL DAILY
Qty: 180 TABLET | Refills: 0 | Status: SHIPPED | OUTPATIENT
Start: 2024-11-14

## 2024-11-15 ENCOUNTER — HOSPITAL ENCOUNTER (OUTPATIENT)
Dept: RADIOLOGY | Facility: HOSPITAL | Age: 74
Discharge: HOME | End: 2024-11-15
Payer: MEDICARE

## 2024-11-15 DIAGNOSIS — G89.29 CHRONIC PAIN OF RIGHT KNEE: ICD-10-CM

## 2024-11-15 DIAGNOSIS — M25.561 CHRONIC PAIN OF RIGHT KNEE: ICD-10-CM

## 2024-11-15 PROCEDURE — 73564 X-RAY EXAM KNEE 4 OR MORE: CPT | Mod: RT

## 2024-11-18 ENCOUNTER — TELEPHONE (OUTPATIENT)
Dept: PHYSICAL MEDICINE AND REHAB | Facility: CLINIC | Age: 74
End: 2024-11-18
Payer: MEDICARE

## 2024-11-18 NOTE — TELEPHONE ENCOUNTER
----- Message from Rachel Rojo sent at 11/16/2024 11:17 PM EST -----  Pls let her know that her xray showed mild to moderate arthritis in the right knee, worse in the inner part of the knee. She should continue with our plan for now and I will see her in follow up. thanks  ----- Message -----  From: Interface, Radiology Results In  Sent: 11/16/2024   2:44 PM EST  To: Rachel Rojo MD

## 2024-11-26 ENCOUNTER — TELEPHONE (OUTPATIENT)
Dept: PHYSICAL MEDICINE AND REHAB | Facility: CLINIC | Age: 74
End: 2024-11-26
Payer: MEDICARE

## 2024-11-26 NOTE — TELEPHONE ENCOUNTER
I called this patient to schedule Supartz injections, she said she's under the impression she is supposed to try the Voltaren Gel first and then get the Supartz if needed.  She would like to hold off for now.  She also said she is getting new insurance in 2025.  I told her to let us know when she is ready and we will order it again (new PA etc will be needed)

## 2024-12-05 ENCOUNTER — APPOINTMENT (OUTPATIENT)
Dept: PHYSICAL MEDICINE AND REHAB | Facility: CLINIC | Age: 74
End: 2024-12-05
Payer: MEDICARE

## 2024-12-17 NOTE — PROGRESS NOTES
Please see scanned EMG report for more details on the history of present illness, physical examination, procedure, and results.        Impression:    There is neurophysiologic evidence of at least moderate median nerve dysfunction at the right wrist.  Median sensory potential is still present, but significantly prolonged, CMAP amplitude is low, which in combination can be designated as a borderline severe median nerve dysfunction.  There is no neurophysiologic evidence of median nerve dysfunction at the left wrist.  There is no neurophysiologic evidence of ulnar nerve dysfunction across either elbow.  There is no neurophysiologic evidence of brachial plexopathy or cervical radiculopathy on the right.         ____________________________  Rachel Rojo MD  Physical Medicine & Rehabilitation  [facility]

## 2024-12-19 ENCOUNTER — APPOINTMENT (OUTPATIENT)
Dept: PHYSICAL MEDICINE AND REHAB | Facility: CLINIC | Age: 74
End: 2024-12-19
Payer: MEDICARE

## 2024-12-19 ENCOUNTER — TELEPHONE (OUTPATIENT)
Dept: PHYSICAL MEDICINE AND REHAB | Facility: CLINIC | Age: 74
End: 2024-12-19

## 2024-12-19 VITALS
WEIGHT: 147 LBS | DIASTOLIC BLOOD PRESSURE: 74 MMHG | TEMPERATURE: 97.5 F | HEART RATE: 76 BPM | HEIGHT: 64 IN | SYSTOLIC BLOOD PRESSURE: 136 MMHG | OXYGEN SATURATION: 99 % | BODY MASS INDEX: 25.1 KG/M2

## 2024-12-19 DIAGNOSIS — G56.01 CARPAL TUNNEL SYNDROME ON RIGHT: Primary | ICD-10-CM

## 2024-12-19 PROCEDURE — 95913 NRV CNDJ TEST 13/> STUDIES: CPT | Performed by: PHYSICAL MEDICINE & REHABILITATION

## 2024-12-19 PROCEDURE — 95886 MUSC TEST DONE W/N TEST COMP: CPT | Performed by: PHYSICAL MEDICINE & REHABILITATION

## 2024-12-19 ASSESSMENT — PAIN SCALES - GENERAL: PAINLEVEL_OUTOF10: 0-NO PAIN

## 2024-12-19 NOTE — TELEPHONE ENCOUNTER
----- Message from Rachel Rojo sent at 12/19/2024 10:08 AM EST -----  Regarding: EMG results  Can you please let her know that the EMG showed moderate to severe carpal tunnel syndrome on the right, everything else was normal.      She should continue with the wrist brace for now, and she can either follow-up with Dr. Mariscal for consideration of surgery or with me for an ultrasound-guided carpal tunnel injection if she wants to try that.  If she does the injection with me, she cannot have any carpal tunnel surgery within 3 months of the injection    Thank you

## 2024-12-24 ENCOUNTER — OFFICE VISIT (OUTPATIENT)
Dept: PRIMARY CARE | Facility: CLINIC | Age: 74
End: 2024-12-24
Payer: MEDICARE

## 2024-12-24 VITALS
BODY MASS INDEX: 24.89 KG/M2 | WEIGHT: 145 LBS | HEART RATE: 67 BPM | OXYGEN SATURATION: 97 % | SYSTOLIC BLOOD PRESSURE: 128 MMHG | RESPIRATION RATE: 18 BRPM | TEMPERATURE: 98.1 F | DIASTOLIC BLOOD PRESSURE: 84 MMHG

## 2024-12-24 DIAGNOSIS — R31.9 HEMATURIA, UNSPECIFIED TYPE: ICD-10-CM

## 2024-12-24 LAB
POC APPEARANCE, URINE: ABNORMAL
POC BILIRUBIN, URINE: ABNORMAL
POC BLOOD, URINE: ABNORMAL
POC COLOR, URINE: ABNORMAL
POC GLUCOSE, URINE: ABNORMAL MG/DL
POC KETONES, URINE: ABNORMAL MG/DL
POC LEUKOCYTES, URINE: ABNORMAL
POC NITRITE,URINE: NEGATIVE
POC PH, URINE: 8.5 PH
POC PROTEIN, URINE: ABNORMAL MG/DL
POC SPECIFIC GRAVITY, URINE: 1.01
POC UROBILINOGEN, URINE: 4 EU/DL

## 2024-12-24 PROCEDURE — 3079F DIAST BP 80-89 MM HG: CPT | Performed by: NURSE PRACTITIONER

## 2024-12-24 PROCEDURE — 87086 URINE CULTURE/COLONY COUNT: CPT | Performed by: NURSE PRACTITIONER

## 2024-12-24 PROCEDURE — 99213 OFFICE O/P EST LOW 20 MIN: CPT | Performed by: NURSE PRACTITIONER

## 2024-12-24 PROCEDURE — 1157F ADVNC CARE PLAN IN RCRD: CPT | Performed by: NURSE PRACTITIONER

## 2024-12-24 PROCEDURE — 3074F SYST BP LT 130 MM HG: CPT | Performed by: NURSE PRACTITIONER

## 2024-12-24 PROCEDURE — 1123F ACP DISCUSS/DSCN MKR DOCD: CPT | Performed by: NURSE PRACTITIONER

## 2024-12-24 PROCEDURE — 1159F MED LIST DOCD IN RCRD: CPT | Performed by: NURSE PRACTITIONER

## 2024-12-24 PROCEDURE — 1036F TOBACCO NON-USER: CPT | Performed by: NURSE PRACTITIONER

## 2024-12-24 PROCEDURE — 81003 URINALYSIS AUTO W/O SCOPE: CPT | Performed by: NURSE PRACTITIONER

## 2024-12-24 PROCEDURE — 1126F AMNT PAIN NOTED NONE PRSNT: CPT | Performed by: NURSE PRACTITIONER

## 2024-12-24 RX ORDER — NITROFURANTOIN 25; 75 MG/1; MG/1
100 CAPSULE ORAL 2 TIMES DAILY
Qty: 14 CAPSULE | Refills: 0 | Status: SHIPPED | OUTPATIENT
Start: 2024-12-24 | End: 2024-12-27 | Stop reason: ALTCHOICE

## 2024-12-24 ASSESSMENT — ENCOUNTER SYMPTOMS
UNEXPECTED WEIGHT CHANGE: 0
FEVER: 0
CONSTIPATION: 0
LOSS OF SENSATION IN FEET: 0
DYSURIA: 1
HEMATURIA: 1
FREQUENCY: 1
FLANK PAIN: 0
OCCASIONAL FEELINGS OF UNSTEADINESS: 0
DEPRESSION: 0

## 2024-12-24 ASSESSMENT — PAIN SCALES - GENERAL: PAINLEVEL_OUTOF10: 0-NO PAIN

## 2024-12-24 NOTE — PROGRESS NOTES
Subjective   Patient ID: July Castillo is a 74 y.o. female who presents for Blood in Urine (Pt said blood in urine this morning and burning with urination.).    Blood in Urine  Irritative symptoms include frequency and urgency. Associated symptoms include dysuria. Pertinent negatives include no fever or flank pain.    Had changed body soap likely contributing     Review of Systems   Constitutional:  Negative for fever and unexpected weight change.   Cardiovascular:  Negative for chest pain.   Gastrointestinal:  Negative for constipation.   Genitourinary:  Positive for dysuria, frequency, hematuria and urgency. Negative for flank pain.   Skin:  Negative for rash.       Objective   /84   Pulse 67   Temp 36.7 °C (98.1 °F)   Resp 18   Wt 65.8 kg (145 lb)   LMP  (LMP Unknown)   SpO2 97%   BMI 24.89 kg/m²       Assessment/Plan   Problem List Items Addressed This Visit    None  Visit Diagnoses         Codes    Hematuria, unspecified type     R31.9    Relevant Medications    nitrofurantoin, macrocrystal-monohydrate, (Macrobid) 100 mg capsule    Other Relevant Orders    POCT UA Automated manually resulted (Completed)    Urine Culture    Referral to Urology

## 2024-12-27 DIAGNOSIS — N30.00 ACUTE CYSTITIS WITHOUT HEMATURIA: Primary | ICD-10-CM

## 2024-12-27 LAB — BACTERIA UR CULT: ABNORMAL

## 2024-12-27 RX ORDER — SULFAMETHOXAZOLE AND TRIMETHOPRIM 800; 160 MG/1; MG/1
1 TABLET ORAL 2 TIMES DAILY
Qty: 28 TABLET | Refills: 0 | Status: SHIPPED | OUTPATIENT
Start: 2024-12-27 | End: 2025-01-10

## 2025-01-03 ENCOUNTER — APPOINTMENT (OUTPATIENT)
Dept: OTOLARYNGOLOGY | Facility: CLINIC | Age: 75
End: 2025-01-03
Payer: MEDICARE

## 2025-02-03 ENCOUNTER — APPOINTMENT (OUTPATIENT)
Dept: ORTHOPEDIC SURGERY | Facility: CLINIC | Age: 75
End: 2025-02-03
Payer: MEDICARE

## 2025-02-03 DIAGNOSIS — G56.01 CARPAL TUNNEL SYNDROME ON RIGHT: Primary | ICD-10-CM

## 2025-02-03 PROCEDURE — 1159F MED LIST DOCD IN RCRD: CPT | Performed by: ORTHOPAEDIC SURGERY

## 2025-02-03 PROCEDURE — 1123F ACP DISCUSS/DSCN MKR DOCD: CPT | Performed by: ORTHOPAEDIC SURGERY

## 2025-02-03 PROCEDURE — 1036F TOBACCO NON-USER: CPT | Performed by: ORTHOPAEDIC SURGERY

## 2025-02-03 PROCEDURE — 1125F AMNT PAIN NOTED PAIN PRSNT: CPT | Performed by: ORTHOPAEDIC SURGERY

## 2025-02-03 PROCEDURE — 1157F ADVNC CARE PLAN IN RCRD: CPT | Performed by: ORTHOPAEDIC SURGERY

## 2025-02-03 PROCEDURE — 99213 OFFICE O/P EST LOW 20 MIN: CPT | Performed by: ORTHOPAEDIC SURGERY

## 2025-02-03 PROCEDURE — 1160F RVW MEDS BY RX/DR IN RCRD: CPT | Performed by: ORTHOPAEDIC SURGERY

## 2025-02-03 RX ORDER — CEFAZOLIN SODIUM 2 G/100ML
2 INJECTION, SOLUTION INTRAVENOUS ONCE
OUTPATIENT
Start: 2025-02-03 | End: 2025-02-03

## 2025-02-03 ASSESSMENT — ENCOUNTER SYMPTOMS
SINUS PRESSURE: 0
SHORTNESS OF BREATH: 0
EYE DISCHARGE: 0
ARTHRALGIAS: 1
CHILLS: 0
WOUND: 0
WHEEZING: 0
TROUBLE SWALLOWING: 0
JOINT SWELLING: 0
FEVER: 0

## 2025-02-03 ASSESSMENT — PAIN SCALES - GENERAL: PAINLEVEL_OUTOF10: 7

## 2025-02-03 ASSESSMENT — PAIN - FUNCTIONAL ASSESSMENT: PAIN_FUNCTIONAL_ASSESSMENT: 0-10

## 2025-02-03 NOTE — PROGRESS NOTES
Reason for Appointment  No chief complaint on file.    History of Present Illness  Patient is a 74 y.o. female here today for follow-up evaluation of continued right hand numbness and tingling.  Recent EMG is reviewed with the report and shows at least moderate right carpal tunnel syndrome.  She is having numbness and tingling in the hand at night she does have a wrist brace she wears at night, and while she uses her phone or reads a book.  She does have a history of a previous lumbar spinal fusion.  No other changes in her past medical history, allergies, or medications.    Past Medical History:   Diagnosis Date    Allergic 2010    Arthritis 2010    Cervical disc disorder Year 2000    Disease of thyroid gland 2010    Heart murmur Child    Hyperlipidemia     Hypertension 2010    Hypothyroidism     Low back pain 2000    Lumbosacral disc disease 2000    Lumbosacral spondylosis with radiculopathy     Neck pain 2015    Osteopenia     Vertigo     remote history       Past Surgical History:   Procedure Laterality Date    COLONOSCOPY  06/22/2022    SPINAL FUSION  05/24/2024    TUMOR EXCISION  2005    Pleomorphic adenoma of parotid    WISDOM TOOTH EXTRACTION  1980       Medication Documentation Review Audit       Reviewed by Екатерина Rosen MA (Medical Assistant) on 02/03/25 at 1102      Medication Order Taking? Sig Documenting Provider Last Dose Status   atorvastatin (Lipitor) 20 mg tablet [id number] Yes TAKE 1 TABLET BY MOUTH EVERY DAY NORAH Gibbons-CNP  Active   calcium carbonate-vitamin D3 (Calcium 600 with Vitamin D3) 600 mg-10 mcg (400 unit) chewable tablet [id number] Yes Chew 1 tablet once daily. Historical Provider, MD Taking Active   clindamycin (Cleocin T) 1 % external solution [id number] Yes Apply topically 2 times a day. MARTÍN Gibbons Taking Active   diphenhydrAMINE-acetaminophen (Tylenol PM)  mg per tablet [id number] Yes Take 1 tablet by mouth as needed at bedtime for sleep. Historical  Provider, MD Taking Active   docusate sodium (Colace) 100 mg capsule [id number] Yes Take 1 capsule (100 mg) by mouth once daily at bedtime. Historical Provider, MD Taking Active   levothyroxine (Synthroid, Levoxyl) 88 mcg tablet [id number] Yes Take 1 tablet (88 mcg) by mouth once daily. Chloe Dutta, APRN-CNP Taking Active   lisinopril 10 mg tablet [id number] Yes TAKE 1 TABLET BY MOUTH EVERY DAY Concepcion Hess, APRN-CNP  Active   sodium hyaluronate (Supartz FX) 10 mg/mL injection [id number]  Bilateral knee joint inj, Q weekly for 5 weeks, 10 syringes total, 20 ml total Rachel Rojo MD  Active                    Allergies   Allergen Reactions    Ibuprofen Swelling     Facial-lips, eyes    Codeine Nausea/vomiting    Lactose Other     Sensitivity    Mold Other     sneezing    Naproxen Hives and Swelling       Review of Systems   Constitutional:  Negative for chills and fever.   HENT:  Negative for nosebleeds, sinus pressure and trouble swallowing.    Eyes:  Negative for discharge.   Respiratory:  Negative for shortness of breath and wheezing.    Cardiovascular:  Negative for chest pain.   Musculoskeletal:  Positive for arthralgias. Negative for joint swelling.   Skin:  Negative for pallor and wound.   All other systems reviewed and are negative.    Exam   On exam bilateral hands show mild DJD but maybe some mild thenar atrophy on the right side.  She does have a positive Tinel's over the right median nerve, positive median nerve compression test.  Good digital motion otherwise with no triggering, mild tenderness over the right thumb CMC joint.  Good pulses and sensation in the upper extremity.    Assessment   Encounter Diagnosis   Name Primary?    Carpal tunnel syndrome on right Yes       Plan   We discussed operative treatment today, she understands the risks of surge including nerve, artery, and tendon damage, infection, continued pain, need for future surgery, and nerve recovery time can be variable and can take  months to slowly improve.  She will call and schedule a right carpal tunnel release.    I, Alise Cassidy PA-C, am acting as a scribe and attest that this documentation has been prepared under the direction and in the presence of Gabriele Mariscal MD.    By signing below, I, Gabriele Mariscal MD, personally performed the services described in this documentation. All medical record entries made by the scribe were at my direction and in my presence. I have reviewed the chart and agree that the record reflects my personal performance and is accurate and complete.

## 2025-02-05 DIAGNOSIS — G56.01 CARPAL TUNNEL SYNDROME ON RIGHT: ICD-10-CM

## 2025-02-21 ENCOUNTER — PRE-ADMISSION TESTING (OUTPATIENT)
Dept: PREADMISSION TESTING | Facility: HOSPITAL | Age: 75
End: 2025-02-21
Payer: MEDICARE

## 2025-02-21 VITALS
BODY MASS INDEX: 25.27 KG/M2 | SYSTOLIC BLOOD PRESSURE: 155 MMHG | TEMPERATURE: 97.7 F | RESPIRATION RATE: 16 BRPM | DIASTOLIC BLOOD PRESSURE: 76 MMHG | HEART RATE: 66 BPM | HEIGHT: 64 IN | WEIGHT: 148 LBS | OXYGEN SATURATION: 100 %

## 2025-02-21 PROCEDURE — 99213 OFFICE O/P EST LOW 20 MIN: CPT | Performed by: NURSE PRACTITIONER

## 2025-02-21 ASSESSMENT — DUKE ACTIVITY SCORE INDEX (DASI)
CAN YOU DO YARD WORK LIKE RAKING LEAVES, WEEDING OR PUSHING A MOWER: YES
CAN YOU CLIMB A FLIGHT OF STAIRS OR WALK UP A HILL: YES
TOTAL_SCORE: 36.7
CAN YOU DO HEAVY WORK AROUND THE HOUSE LIKE SCRUBBING FLOORS OR LIFTING AND MOVING HEAVY FURNITURE: YES
CAN YOU RUN A SHORT DISTANCE: NO
CAN YOU DO LIGHT WORK AROUND THE HOUSE LIKE DUSTING OR WASHING DISHES: YES
DASI METS SCORE: 7.3
CAN YOU WALK INDOORS, SUCH AS AROUND YOUR HOUSE: YES
CAN YOU HAVE SEXUAL RELATIONS: YES
CAN YOU WALK A BLOCK OR TWO ON LEVEL GROUND: YES
CAN YOU PARTICIPATE IN STRENOUS SPORTS LIKE SWIMMING, SINGLES TENNIS, FOOTBALL, BASKETBALL, OR SKIING: NO
CAN YOU PARTICIPATE IN MODERATE RECREATIONAL ACTIVITIES LIKE GOLF, BOWLING, DANCING, DOUBLES TENNIS OR THROWING A BASEBALL OR FOOTBALL: NO
CAN YOU TAKE CARE OF YOURSELF (EAT, DRESS, BATHE, OR USE TOILET): YES
CAN YOU DO MODERATE WORK AROUND THE HOUSE LIKE VACUUMING, SWEEPING FLOORS OR CARRYING GROCERIES: YES

## 2025-02-21 ASSESSMENT — ENCOUNTER SYMPTOMS
PSYCHIATRIC NEGATIVE: 1
NEUROLOGICAL NEGATIVE: 1
ENDOCRINE NEGATIVE: 1
EYES NEGATIVE: 1
HEMATOLOGIC/LYMPHATIC NEGATIVE: 1
CONSTITUTIONAL NEGATIVE: 1
RESPIRATORY NEGATIVE: 1
CARDIOVASCULAR NEGATIVE: 1
GASTROINTESTINAL NEGATIVE: 1

## 2025-02-21 ASSESSMENT — PAIN DESCRIPTION - DESCRIPTORS: DESCRIPTORS: TINGLING;SORE

## 2025-02-21 ASSESSMENT — PAIN - FUNCTIONAL ASSESSMENT: PAIN_FUNCTIONAL_ASSESSMENT: 0-10

## 2025-02-21 NOTE — H&P (VIEW-ONLY)
CPM/PAT Evaluation       Name: July Castillo (July Castillo)  /Age: 1950/74 y.o.     In-Person       Chief Complaint: right carpal tunnel    HPI    Pt is a 74 year old female with right carpal tunnel. Pt reports she has had carpal tunnel symptoms over the past few years and her symptoms continue to worsen. Pt describes her symptoms and numbness and tingling in her right thumb, index, and middle fingers. Pt wears a wrist guard at night. Pt reports her carpal tunnel symptoms are waking her up at night. Pt was examined by her surgeon and has been scheduled for right carpal tunnel release. Pt denies CP, SOB, or dizziness.     Past Medical History:   Diagnosis Date    Allergic 2010    Arthritis     Cervical disc disorder Year     Disease of thyroid gland     Heart murmur Child    Hyperlipidemia     Hypertension     Hypothyroidism     Low back pain     Lumbosacral disc disease     Lumbosacral spondylosis with radiculopathy     Neck pain 2015    Osteopenia     Vertigo     remote history     Past Surgical History:   Procedure Laterality Date    COLONOSCOPY  2022    SPINAL FUSION  2024    L3-5 TLIF/decompression with posterolateral instrumented fusion    TUMOR EXCISION  2005    Pleomorphic adenoma of parotid    WISDOM TOOTH EXTRACTION       Social History     Tobacco Use    Smoking status: Never    Smokeless tobacco: Never   Substance Use Topics    Alcohol use: Yes     Alcohol/week: 1.0 standard drink of alcohol     Types: 1 Glasses of wine per week     Social History     Substance and Sexual Activity   Drug Use Not Currently    Types: Marijuana    Comment: LAST USED 15 MONTHS AGO     Patient  reports that she is not currently sexually active. She reports using the following method of birth control/protection: None.    Family History   Problem Relation Name Age of Onset    Arthritis Mother Aurora     Hypertension Mother Aurora     Kidney disease Mother Aurora     Heart disease  Father Tate     Hypertension Father Tate     Stroke Father Tate     Dementia Father Tate     Alzheimer's disease Father Tate     Breast cancer Sister  68       Allergies   Allergen Reactions    Ibuprofen Swelling     Facial-lips, eyes    Codeine Nausea/vomiting    Lactose Other     Sensitivity    Mold Other     sneezing    Naproxen Hives and Swelling     Current Outpatient Medications   Medication Sig Dispense Refill    atorvastatin (Lipitor) 20 mg tablet TAKE 1 TABLET BY MOUTH EVERY  tablet 0    BIOTIN ORAL Take 10,000 Units by mouth once daily.      calcium carbonate-vitamin D3 (Calcium 600 with Vitamin D3) 600 mg-10 mcg (400 unit) chewable tablet Chew 1 tablet once daily.      clindamycin (Cleocin T) 1 % external solution Apply topically 2 times a day. 1 mL 11    diphenhydrAMINE-acetaminophen (Tylenol PM)  mg per tablet Take 1 tablet by mouth as needed at bedtime for sleep.      docusate sodium (Colace) 100 mg capsule Take 1 capsule (100 mg) by mouth as needed at bedtime for constipation.      levothyroxine (Synthroid, Levoxyl) 88 mcg tablet Take 1 tablet (88 mcg) by mouth once daily. 90 tablet 3    lisinopril 10 mg tablet TAKE 1 TABLET BY MOUTH EVERY DAY 90 tablet 1    sodium hyaluronate (Supartz FX) 10 mg/mL injection Bilateral knee joint inj, Q weekly for 5 weeks, 10 syringes total, 20 ml total 20 mL 0     No current facility-administered medications for this visit.     Review of Systems   Constitutional: Negative.    HENT: Negative.     Eyes: Negative.    Respiratory: Negative.     Cardiovascular: Negative.    Gastrointestinal: Negative.    Endocrine: Negative.    Genitourinary: Negative.    Musculoskeletal:         Right wrist carpal tunnel. Numbness and tingling in right thumb, index, and middle fingers.    Skin: Negative.    Neurological: Negative.    Hematological: Negative.    Psychiatric/Behavioral: Negative.       Physical Exam  Vitals reviewed.   Constitutional:       Appearance:  "Normal appearance.   HENT:      Head: Normocephalic and atraumatic.      Nose: Nose normal.      Mouth/Throat:      Mouth: Mucous membranes are moist.      Pharynx: Oropharynx is clear.   Eyes:      Extraocular Movements: Extraocular movements intact.      Conjunctiva/sclera: Conjunctivae normal.      Pupils: Pupils are equal, round, and reactive to light.   Cardiovascular:      Rate and Rhythm: Normal rate and regular rhythm.      Heart sounds: Normal heart sounds.   Pulmonary:      Effort: Pulmonary effort is normal.      Breath sounds: Normal breath sounds.   Abdominal:      General: Bowel sounds are normal.      Palpations: Abdomen is soft.   Musculoskeletal:      Cervical back: Normal range of motion and neck supple.      Comments: + right wrist phalen test.    Skin:     General: Skin is warm and dry.   Neurological:      General: No focal deficit present.      Mental Status: She is alert and oriented to person, place, and time. Mental status is at baseline.   Psychiatric:         Mood and Affect: Mood normal.         Behavior: Behavior normal.         Thought Content: Thought content normal.         Judgment: Judgment normal.          PAT AIRWAY:   Airway:     Mallampati::  II    TM distance::  >3 FB    Neck ROM::  Full  normal    Visit Vitals  /76   Pulse 66   Temp 36.5 °C (97.7 °F) (Temporal)   Resp 16   Ht 1.626 m (5' 4\")   Wt 67.1 kg (148 lb)   LMP  (LMP Unknown)   SpO2 100%   BMI 25.40 kg/m²   OB Status Postmenopausal   Smoking Status Never   BSA 1.74 m²     ASA: 2  CHADS: 2.8%  RCRI: 0.4%    DASI Risk Score      Flowsheet Row Pre-Admission Testing from 2/21/2025 in [facility] Questionnaire Series Submission from 2/3/2025 in [facility] with Generic Provider Nehemiah   Can you take care of yourself (eat, dress, bathe, or use toilet)?  2.75 filed at 02/21/2025 1308 2.75  filed at 02/03/2025 1344   Can you walk indoors, such as around your house? 1.75 filed at 02/21/2025 1308 1.75  " filed at 02/03/2025 1344   Can you walk a block or two on level ground?  2.75 filed at 02/21/2025 1308 2.75  filed at 02/03/2025 1344   Can you climb a flight of stairs or walk up a hill? 5.5 filed at 02/21/2025 1308 5.5  filed at 02/03/2025 1344   Can you run a short distance? 0 filed at 02/21/2025 1308 8  filed at 02/03/2025 1344   Can you do light work around the house like dusting or washing dishes? 2.7 filed at 02/21/2025 1308 2.7  filed at 02/03/2025 1344   Can you do moderate work around the house like vacuuming, sweeping floors or carrying groceries? 3.5 filed at 02/21/2025 1308 3.5  filed at 02/03/2025 1344   Can you do heavy work around the house like scrubbing floors or lifting and moving heavy furniture?  8 filed at 02/21/2025 1308 0  filed at 02/03/2025 1344   Can you do yard work like raking leaves, weeding or pushing a mower? 4.5 filed at 02/21/2025 1308 4.5  filed at 02/03/2025 1344   Can you have sexual relations? 5.25 filed at 02/21/2025 1308 5.25  filed at 02/03/2025 1344   Can you participate in moderate recreational activities like golf, bowling, dancing, doubles tennis or throwing a baseball or football? 0 filed at 02/21/2025 1308 6  filed at 02/03/2025 1344   Can you participate in strenous sports like swimming, singles tennis, football, basketball, or skiing? 0 filed at 02/21/2025 1308 0  filed at 02/03/2025 1344   DASI SCORE 36.7 filed at 02/21/2025 1308 42.7  filed at 02/03/2025 1344   METS Score (Will be calculated only when all the questions are answered) 7.3 filed at 02/21/2025 1308 8  filed at 02/03/2025 1344          Caprini DVT Assessment      Flowsheet Row Admission (Discharged) from 5/24/2024 in [facility] A 7 with Demetrius Wright MD   DVT Score (IF A SCORE IS NOT CALCULATING, MUST SELECT A BMI TO COMPLETE) 8 filed at 05/24/2024 0622   RETIRED: Current Status Major surgery planned, lasting over 3 hours filed at 05/24/2024 0622   RETIRED: History Prior major surgery  filed at 05/24/2024 0622   RETIRED: Age 60-75 years filed at 05/24/2024 0622          Modified Frailty Index    No data to display       CHADS2 Stroke Risk  Current as of 6 hours ago        N/A 3 to 100%: High Risk   2 to < 3%: Medium Risk   0 to < 2%: Low Risk     Last Change: N/A          This score determines the patient's risk of having a stroke if the patient has atrial fibrillation.        This score is not applicable to this patient. Components are not calculated.          Revised Cardiac Risk Index      Flowsheet Row Pre-Admission Testing from 2/21/2025 in [facility]   High-Risk Surgery (Intraperitoneal, Intrathoracic,Suprainguinal vascular) 0 filed at 02/21/2025 1327   History of ischemic heart disease (History of MI, History of positive exercuse test, Current chest paint considered due to myocardial ischemia, Use of nitrate therapy, ECG with pathological Q Waves) 0 filed at 02/21/2025 1327   History of congestive heart failure (pulmonary edemia, bilateral rales or S3 gallop, Paroxysmal nocturnal dyspnea, CXR showing pulmonary vascular redistribution) 0 filed at 02/21/2025 1327   History of cerebrovascular disease (Prior TIA or stroke) 0 filed at 02/21/2025 1327   Pre-operative insulin treatment 0 filed at 02/21/2025 1327   Pre-operative creatinine>2 mg/dl 0 filed at 02/21/2025 1327   Revised Cardiac Risk Calculator 0 filed at 02/21/2025 1327          Apfel Simplified Score    No data to display       Risk Analysis Index Results This Encounter    No data found in the last 10 encounters.       Stop Bang Score      Flowsheet Row Pre-Admission Testing from 2/21/2025 in [facility] Questionnaire Series Submission from 2/3/2025 in [facility] with Generic Provider Nehemiah   Do you snore loudly? 0 filed at 02/21/2025 1307 0  filed at 02/03/2025 1344   Do you often feel tired or fatigued after your sleep? 0 filed at 02/21/2025 1307 0  filed at 02/03/2025 1344   Has anyone ever  observed you stop breathing in your sleep? 0 filed at 02/21/2025 1307 0  filed at 02/03/2025 1344   Do you have or are you being treated for high blood pressure? 1 filed at 02/21/2025 1307 1  filed at 02/03/2025 1344   Recent BMI (Calculated) 25.4 filed at 02/21/2025 1307 25.2  filed at 02/03/2025 1344   Is BMI greater than 35 kg/m2? 0=No filed at 02/21/2025 1307 0=No  filed at 02/03/2025 1344   Age older than 50 years old? 1=Yes filed at 02/21/2025 1307 1=Yes  filed at 02/03/2025 1344   Is your neck circumference greater than 17 inches (Male) or 16 inches (Female)? 0 filed at 02/21/2025 1307 --   Gender - Male 0=No filed at 02/21/2025 1307 0=No  filed at 02/03/2025 1344   STOP-BANG Total Score 2 filed at 02/21/2025 1307 --          Prodigy: High Risk  Total Score: 12              Prodigy Age Score           ARISCAT Score for Postoperative Pulmonary Complications    No data to display       De Oliveira Perioperative Risk for Myocardial Infarction or Cardiac Arrest (LETTY)    No data to display         Assessment and Plan:     Carpal tunnel syndrome on right: DECOMPRESSION, NERVE, MEDIAN.  HTN: Pt is taking lisinopril.   Hyperlipidemia: Pt is taking atorvastatin.   Hypothyroidism: taking levothyroxine. 5/10/2024: 2.6  H/O vertigo    Terri Nath APRN-CNP

## 2025-02-21 NOTE — CPM/PAT H&P
CPM/PAT Evaluation       Name: July Castillo (July Castillo)  /Age: 1950/74 y.o.     In-Person       Chief Complaint: right carpal tunnel    HPI    Pt is a 74 year old female with right carpal tunnel. Pt reports she has had carpal tunnel symptoms over the past few years and her symptoms continue to worsen. Pt describes her symptoms and numbness and tingling in her right thumb, index, and middle fingers. Pt wears a wrist guard at night. Pt reports her carpal tunnel symptoms are waking her up at night. Pt was examined by her surgeon and has been scheduled for right carpal tunnel release. Pt denies CP, SOB, or dizziness.     Past Medical History:   Diagnosis Date    Allergic 2010    Arthritis     Cervical disc disorder Year     Disease of thyroid gland     Heart murmur Child    Hyperlipidemia     Hypertension     Hypothyroidism     Low back pain     Lumbosacral disc disease     Lumbosacral spondylosis with radiculopathy     Neck pain 2015    Osteopenia     Vertigo     remote history     Past Surgical History:   Procedure Laterality Date    COLONOSCOPY  2022    SPINAL FUSION  2024    L3-5 TLIF/decompression with posterolateral instrumented fusion    TUMOR EXCISION  2005    Pleomorphic adenoma of parotid    WISDOM TOOTH EXTRACTION       Social History     Tobacco Use    Smoking status: Never    Smokeless tobacco: Never   Substance Use Topics    Alcohol use: Yes     Alcohol/week: 1.0 standard drink of alcohol     Types: 1 Glasses of wine per week     Social History     Substance and Sexual Activity   Drug Use Not Currently    Types: Marijuana    Comment: LAST USED 15 MONTHS AGO     Patient  reports that she is not currently sexually active. She reports using the following method of birth control/protection: None.    Family History   Problem Relation Name Age of Onset    Arthritis Mother Aurora     Hypertension Mother Aurora     Kidney disease Mother Aurora     Heart disease  Father Tate     Hypertension Father Tate     Stroke Father Tate     Dementia Father Tate     Alzheimer's disease Father Tate     Breast cancer Sister  68       Allergies   Allergen Reactions    Ibuprofen Swelling     Facial-lips, eyes    Codeine Nausea/vomiting    Lactose Other     Sensitivity    Mold Other     sneezing    Naproxen Hives and Swelling     Current Outpatient Medications   Medication Sig Dispense Refill    atorvastatin (Lipitor) 20 mg tablet TAKE 1 TABLET BY MOUTH EVERY  tablet 0    BIOTIN ORAL Take 10,000 Units by mouth once daily.      calcium carbonate-vitamin D3 (Calcium 600 with Vitamin D3) 600 mg-10 mcg (400 unit) chewable tablet Chew 1 tablet once daily.      clindamycin (Cleocin T) 1 % external solution Apply topically 2 times a day. 1 mL 11    diphenhydrAMINE-acetaminophen (Tylenol PM)  mg per tablet Take 1 tablet by mouth as needed at bedtime for sleep.      docusate sodium (Colace) 100 mg capsule Take 1 capsule (100 mg) by mouth as needed at bedtime for constipation.      levothyroxine (Synthroid, Levoxyl) 88 mcg tablet Take 1 tablet (88 mcg) by mouth once daily. 90 tablet 3    lisinopril 10 mg tablet TAKE 1 TABLET BY MOUTH EVERY DAY 90 tablet 1    sodium hyaluronate (Supartz FX) 10 mg/mL injection Bilateral knee joint inj, Q weekly for 5 weeks, 10 syringes total, 20 ml total 20 mL 0     No current facility-administered medications for this visit.     Review of Systems   Constitutional: Negative.    HENT: Negative.     Eyes: Negative.    Respiratory: Negative.     Cardiovascular: Negative.    Gastrointestinal: Negative.    Endocrine: Negative.    Genitourinary: Negative.    Musculoskeletal:         Right wrist carpal tunnel. Numbness and tingling in right thumb, index, and middle fingers.    Skin: Negative.    Neurological: Negative.    Hematological: Negative.    Psychiatric/Behavioral: Negative.       Physical Exam  Vitals reviewed.   Constitutional:       Appearance:  "Normal appearance.   HENT:      Head: Normocephalic and atraumatic.      Nose: Nose normal.      Mouth/Throat:      Mouth: Mucous membranes are moist.      Pharynx: Oropharynx is clear.   Eyes:      Extraocular Movements: Extraocular movements intact.      Conjunctiva/sclera: Conjunctivae normal.      Pupils: Pupils are equal, round, and reactive to light.   Cardiovascular:      Rate and Rhythm: Normal rate and regular rhythm.      Heart sounds: Normal heart sounds.   Pulmonary:      Effort: Pulmonary effort is normal.      Breath sounds: Normal breath sounds.   Abdominal:      General: Bowel sounds are normal.      Palpations: Abdomen is soft.   Musculoskeletal:      Cervical back: Normal range of motion and neck supple.      Comments: + right wrist phalen test.    Skin:     General: Skin is warm and dry.   Neurological:      General: No focal deficit present.      Mental Status: She is alert and oriented to person, place, and time. Mental status is at baseline.   Psychiatric:         Mood and Affect: Mood normal.         Behavior: Behavior normal.         Thought Content: Thought content normal.         Judgment: Judgment normal.          PAT AIRWAY:   Airway:     Mallampati::  II    TM distance::  >3 FB    Neck ROM::  Full  normal    Visit Vitals  /76   Pulse 66   Temp 36.5 °C (97.7 °F) (Temporal)   Resp 16   Ht 1.626 m (5' 4\")   Wt 67.1 kg (148 lb)   LMP  (LMP Unknown)   SpO2 100%   BMI 25.40 kg/m²   OB Status Postmenopausal   Smoking Status Never   BSA 1.74 m²     ASA: 2  CHADS: 2.8%  RCRI: 0.4%    DASI Risk Score      Flowsheet Row Pre-Admission Testing from 2/21/2025 in [facility] Questionnaire Series Submission from 2/3/2025 in [facility] with Generic Provider Nehemiah   Can you take care of yourself (eat, dress, bathe, or use toilet)?  2.75 filed at 02/21/2025 1308 2.75  filed at 02/03/2025 1344   Can you walk indoors, such as around your house? 1.75 filed at 02/21/2025 1308 1.75  " filed at 02/03/2025 1344   Can you walk a block or two on level ground?  2.75 filed at 02/21/2025 1308 2.75  filed at 02/03/2025 1344   Can you climb a flight of stairs or walk up a hill? 5.5 filed at 02/21/2025 1308 5.5  filed at 02/03/2025 1344   Can you run a short distance? 0 filed at 02/21/2025 1308 8  filed at 02/03/2025 1344   Can you do light work around the house like dusting or washing dishes? 2.7 filed at 02/21/2025 1308 2.7  filed at 02/03/2025 1344   Can you do moderate work around the house like vacuuming, sweeping floors or carrying groceries? 3.5 filed at 02/21/2025 1308 3.5  filed at 02/03/2025 1344   Can you do heavy work around the house like scrubbing floors or lifting and moving heavy furniture?  8 filed at 02/21/2025 1308 0  filed at 02/03/2025 1344   Can you do yard work like raking leaves, weeding or pushing a mower? 4.5 filed at 02/21/2025 1308 4.5  filed at 02/03/2025 1344   Can you have sexual relations? 5.25 filed at 02/21/2025 1308 5.25  filed at 02/03/2025 1344   Can you participate in moderate recreational activities like golf, bowling, dancing, doubles tennis or throwing a baseball or football? 0 filed at 02/21/2025 1308 6  filed at 02/03/2025 1344   Can you participate in strenous sports like swimming, singles tennis, football, basketball, or skiing? 0 filed at 02/21/2025 1308 0  filed at 02/03/2025 1344   DASI SCORE 36.7 filed at 02/21/2025 1308 42.7  filed at 02/03/2025 1344   METS Score (Will be calculated only when all the questions are answered) 7.3 filed at 02/21/2025 1308 8  filed at 02/03/2025 1344          Caprini DVT Assessment      Flowsheet Row Admission (Discharged) from 5/24/2024 in [facility] A 7 with Demetrius Wright MD   DVT Score (IF A SCORE IS NOT CALCULATING, MUST SELECT A BMI TO COMPLETE) 8 filed at 05/24/2024 0622   RETIRED: Current Status Major surgery planned, lasting over 3 hours filed at 05/24/2024 0622   RETIRED: History Prior major surgery  filed at 05/24/2024 0622   RETIRED: Age 60-75 years filed at 05/24/2024 0622          Modified Frailty Index    No data to display       CHADS2 Stroke Risk  Current as of 6 hours ago        N/A 3 to 100%: High Risk   2 to < 3%: Medium Risk   0 to < 2%: Low Risk     Last Change: N/A          This score determines the patient's risk of having a stroke if the patient has atrial fibrillation.        This score is not applicable to this patient. Components are not calculated.          Revised Cardiac Risk Index      Flowsheet Row Pre-Admission Testing from 2/21/2025 in [facility]   High-Risk Surgery (Intraperitoneal, Intrathoracic,Suprainguinal vascular) 0 filed at 02/21/2025 1327   History of ischemic heart disease (History of MI, History of positive exercuse test, Current chest paint considered due to myocardial ischemia, Use of nitrate therapy, ECG with pathological Q Waves) 0 filed at 02/21/2025 1327   History of congestive heart failure (pulmonary edemia, bilateral rales or S3 gallop, Paroxysmal nocturnal dyspnea, CXR showing pulmonary vascular redistribution) 0 filed at 02/21/2025 1327   History of cerebrovascular disease (Prior TIA or stroke) 0 filed at 02/21/2025 1327   Pre-operative insulin treatment 0 filed at 02/21/2025 1327   Pre-operative creatinine>2 mg/dl 0 filed at 02/21/2025 1327   Revised Cardiac Risk Calculator 0 filed at 02/21/2025 1327          Apfel Simplified Score    No data to display       Risk Analysis Index Results This Encounter    No data found in the last 10 encounters.       Stop Bang Score      Flowsheet Row Pre-Admission Testing from 2/21/2025 in [facility] Questionnaire Series Submission from 2/3/2025 in [facility] with Generic Provider Nehemiah   Do you snore loudly? 0 filed at 02/21/2025 1307 0  filed at 02/03/2025 1344   Do you often feel tired or fatigued after your sleep? 0 filed at 02/21/2025 1307 0  filed at 02/03/2025 1344   Has anyone ever  observed you stop breathing in your sleep? 0 filed at 02/21/2025 1307 0  filed at 02/03/2025 1344   Do you have or are you being treated for high blood pressure? 1 filed at 02/21/2025 1307 1  filed at 02/03/2025 1344   Recent BMI (Calculated) 25.4 filed at 02/21/2025 1307 25.2  filed at 02/03/2025 1344   Is BMI greater than 35 kg/m2? 0=No filed at 02/21/2025 1307 0=No  filed at 02/03/2025 1344   Age older than 50 years old? 1=Yes filed at 02/21/2025 1307 1=Yes  filed at 02/03/2025 1344   Is your neck circumference greater than 17 inches (Male) or 16 inches (Female)? 0 filed at 02/21/2025 1307 --   Gender - Male 0=No filed at 02/21/2025 1307 0=No  filed at 02/03/2025 1344   STOP-BANG Total Score 2 filed at 02/21/2025 1307 --          Prodigy: High Risk  Total Score: 12              Prodigy Age Score           ARISCAT Score for Postoperative Pulmonary Complications    No data to display       De Oliveira Perioperative Risk for Myocardial Infarction or Cardiac Arrest (LETTY)    No data to display         Assessment and Plan:     Carpal tunnel syndrome on right: DECOMPRESSION, NERVE, MEDIAN.  HTN: Pt is taking lisinopril.   Hyperlipidemia: Pt is taking atorvastatin.   Hypothyroidism: taking levothyroxine. 5/10/2024: 2.6  H/O vertigo    Terri Nath APRN-CNP

## 2025-02-21 NOTE — PREPROCEDURE INSTRUCTIONS
Medication List            Accurate as of February 21, 2025  1:09 PM. Always use your most recent med list.                atorvastatin 20 mg tablet  Commonly known as: Lipitor  TAKE 1 TABLET BY MOUTH EVERY DAY  Medication Adjustments for Surgery: Take/Use as prescribed     BIOTIN ORAL  Additional Medication Adjustments for Surgery: Take last dose 7 days before surgery     Calcium 600 with Vitamin D3 600 mg-10 mcg (400 unit) chewable tablet  Generic drug: calcium carbonate-vitamin D3  Additional Medication Adjustments for Surgery: Take last dose 7 days before surgery     clindamycin 1 % external solution  Commonly known as: Cleocin T  Apply topically 2 times a day.  Medication Adjustments for Surgery: Take/Use as prescribed     diphenhydrAMINE-acetaminophen  mg per tablet  Commonly known as: Tylenol PM  Medication Adjustments for Surgery: Take/Use as prescribed     docusate sodium 100 mg capsule  Commonly known as: Colace  Medication Adjustments for Surgery: Take/Use as prescribed     levothyroxine 88 mcg tablet  Commonly known as: Synthroid, Levoxyl  Take 1 tablet (88 mcg) by mouth once daily.  Medication Adjustments for Surgery: Take on the morning of surgery     lisinopril 10 mg tablet  TAKE 1 TABLET BY MOUTH EVERY DAY  Medication Adjustments for Surgery: Do Not take on the morning of surgery     Supartz FX 10 mg/mL injection  Generic drug: sodium hyaluronate  Bilateral knee joint inj, Q weekly for 5 weeks, 10 syringes total, 20 ml total  Notes to patient: HAS NOT STARTED                              NPO Instructions:    Do not eat any food after midnight the night before your surgery/procedure.    Additional Instructions:     Day of Surgery:  Review your medication instructions, take indicated medications  Wear  comfortable loose fitting clothing  Do not use moisturizers, creams, lotions or perfume  All jewelry and valuables should be left at home          Why must I stop eating and drinking near  surgery time?  With sedation, food or liquid in your stomach can enter your lungs causing serious complications  Increases nausea and vomiting    When do I need to stop eating and drinking before my surgery?   Do not eat or drink after midnight the night before your surgery/procedure.  You may have small sips of water to take your medication.      PAT DISCHARGE INSTRUCTIONS    Please call the Same Day Surgery (SDS) Department of the hospital where your procedure will be performed after 2:00 PM the day before your surgery. If you are scheduled on a Monday, or a Tuesday following a Monday holiday, you will need to call on the last business day prior to your surgery.      [facility]  [address]  [place] [address]  [phone]    Please let your surgeon know if:      You develop any open sores, shingles, burning or painful urination as these may increase your risk of an infection.   You no longer wish to have the surgery.   Any other personal circumstances change that may lead to the need to cancel or defer this surgery-such as being sick or getting admitted to any hospital within one week of your planned procedure.    Your contact details change, such as a change of address or phone number.    Starting now:     Please DO NOT drink alcohol or smoke for 24 hours before surgery. It is well known that quitting smoking can make a huge difference to your health and recovery from surgery. The longer you abstain from smoking, the better your chances of a healthy recovery. If you need help with quitting, call 800QUIT-NOW to be connected to a trained counselor who will discuss the best methods to help you quit.     Before your surgery:    Please stop all supplements 7 days prior to surgery. Or as directed by your surgeon.   Please stop taking NSAID pain medicine such as Advil and Motrin 7 days before surgery.    If you develop any fever, cough, cold, rashes, cuts, scratches,  scrapes, urinary symptoms or infection anywhere on your body (including teeth and gums) prior to surgery, please call your surgeon’s office as soon as possible. This may require treatment to reduce the chance of cancellation on the day of surgery.    The day before your surgery:   DIET- Please follow the diet instructions at the top of your packet.   Get a good night’s rest.  Use the special soap for bathing if you have been instructed to use one.    Scheduled surgery times may change and you will be notified if this occurs - please check your personal voicemail for any updates.     On the morning of surgery:   Wear comfortable, loose fitting clothes which open in the front. Please do not wear moisturizers, creams, lotions, makeup or perfume.    Please bring with you to surgery:   Photo ID and insurance card   Current list of medicines and allergies   Pacemaker/ Defibrillator/Heart stent cards   CPAP machine and mask    Slings/ splints/ crutches   A copy of your complete advanced directive/DHPOA.    Please do NOT bring with you to surgery:   All jewelry and valuables should be left at home.   Prosthetic devices such as contact lenses, hearing aids, dentures, eyelash extensions, hairpins and body piercings must be removed prior to going in to the surgical suite.    After outpatient surgery:   A responsible adult MUST accompany you at the time of discharge and stay with you for 24 hours after your surgery. You may NOT drive yourself home after surgery.    Do not drive, operate machinery, make critical decisions or do activities that require co-ordination or balance until after a night’s sleep.   Do not drink alcoholic beverages for 24 hours.   Instructions for resuming your medications will be provided by your surgeon.    CALL YOUR DOCTOR AFTER SURGERY IF YOU HAVE:     Chills and/or a fever of 101° F or higher.    Redness, swelling, pus or drainage from your surgical wound or a bad smell from the wound.     Lightheadedness, fainting or confusion.    Persistent vomiting (throwing up) and are not able to eat or drink for 12 hours.    Three or more loose, watery bowel movements in 24 hours (diarrhea).   Difficulty or pain while urinating( after non-urological surgery)    Pain and swelling in your legs, especially if it is only on one side.    Difficulty breathing or are breathing faster than normal.    Any new concerning symptoms.

## 2025-03-07 ENCOUNTER — ANESTHESIA (OUTPATIENT)
Dept: OPERATING ROOM | Facility: HOSPITAL | Age: 75
End: 2025-03-07
Payer: MEDICARE

## 2025-03-07 ENCOUNTER — ANESTHESIA EVENT (OUTPATIENT)
Dept: OPERATING ROOM | Facility: HOSPITAL | Age: 75
End: 2025-03-07
Payer: MEDICARE

## 2025-03-07 ENCOUNTER — HOSPITAL ENCOUNTER (OUTPATIENT)
Facility: HOSPITAL | Age: 75
Setting detail: OUTPATIENT SURGERY
Discharge: HOME | End: 2025-03-07
Attending: ORTHOPAEDIC SURGERY | Admitting: ORTHOPAEDIC SURGERY
Payer: MEDICARE

## 2025-03-07 VITALS
SYSTOLIC BLOOD PRESSURE: 151 MMHG | TEMPERATURE: 98.4 F | DIASTOLIC BLOOD PRESSURE: 83 MMHG | HEIGHT: 64 IN | BODY MASS INDEX: 25 KG/M2 | WEIGHT: 146.4 LBS | HEART RATE: 67 BPM | RESPIRATION RATE: 16 BRPM | OXYGEN SATURATION: 99 %

## 2025-03-07 DIAGNOSIS — G56.01 CARPAL TUNNEL SYNDROME ON RIGHT: Primary | ICD-10-CM

## 2025-03-07 PROCEDURE — 64721 CARPAL TUNNEL SURGERY: CPT | Performed by: ORTHOPAEDIC SURGERY

## 2025-03-07 PROCEDURE — 7100000010 HC PHASE TWO TIME - EACH INCREMENTAL 1 MINUTE: Performed by: ORTHOPAEDIC SURGERY

## 2025-03-07 PROCEDURE — 7100000009 HC PHASE TWO TIME - INITIAL BASE CHARGE: Performed by: ORTHOPAEDIC SURGERY

## 2025-03-07 PROCEDURE — 3600000008 HC OR TIME - EACH INCREMENTAL 1 MINUTE - PROCEDURE LEVEL THREE: Performed by: ORTHOPAEDIC SURGERY

## 2025-03-07 PROCEDURE — 3700000001 HC GENERAL ANESTHESIA TIME - INITIAL BASE CHARGE: Performed by: ORTHOPAEDIC SURGERY

## 2025-03-07 PROCEDURE — 2500000004 HC RX 250 GENERAL PHARMACY W/ HCPCS (ALT 636 FOR OP/ED): Performed by: ORTHOPAEDIC SURGERY

## 2025-03-07 PROCEDURE — 2500000004 HC RX 250 GENERAL PHARMACY W/ HCPCS (ALT 636 FOR OP/ED): Performed by: PHYSICIAN ASSISTANT

## 2025-03-07 PROCEDURE — 7100000002 HC RECOVERY ROOM TIME - EACH INCREMENTAL 1 MINUTE: Performed by: ORTHOPAEDIC SURGERY

## 2025-03-07 PROCEDURE — 3700000002 HC GENERAL ANESTHESIA TIME - EACH INCREMENTAL 1 MINUTE: Performed by: ORTHOPAEDIC SURGERY

## 2025-03-07 PROCEDURE — 2500000005 HC RX 250 GENERAL PHARMACY W/O HCPCS: Performed by: ORTHOPAEDIC SURGERY

## 2025-03-07 PROCEDURE — 2500000004 HC RX 250 GENERAL PHARMACY W/ HCPCS (ALT 636 FOR OP/ED): Performed by: NURSE ANESTHETIST, CERTIFIED REGISTERED

## 2025-03-07 PROCEDURE — 7100000001 HC RECOVERY ROOM TIME - INITIAL BASE CHARGE: Performed by: ORTHOPAEDIC SURGERY

## 2025-03-07 PROCEDURE — 3600000003 HC OR TIME - INITIAL BASE CHARGE - PROCEDURE LEVEL THREE: Performed by: ORTHOPAEDIC SURGERY

## 2025-03-07 RX ORDER — FENTANYL CITRATE 50 UG/ML
25 INJECTION, SOLUTION INTRAMUSCULAR; INTRAVENOUS EVERY 5 MIN PRN
Status: DISCONTINUED | OUTPATIENT
Start: 2025-03-07 | End: 2025-03-07 | Stop reason: HOSPADM

## 2025-03-07 RX ORDER — OXYCODONE HYDROCHLORIDE 5 MG/1
5 TABLET ORAL ONCE AS NEEDED
Status: DISCONTINUED | OUTPATIENT
Start: 2025-03-07 | End: 2025-03-07 | Stop reason: HOSPADM

## 2025-03-07 RX ORDER — PROPOFOL 10 MG/ML
INJECTION, EMULSION INTRAVENOUS AS NEEDED
Status: DISCONTINUED | OUTPATIENT
Start: 2025-03-07 | End: 2025-03-07

## 2025-03-07 RX ORDER — BUPIVACAINE HYDROCHLORIDE 5 MG/ML
INJECTION, SOLUTION PERINEURAL AS NEEDED
Status: DISCONTINUED | OUTPATIENT
Start: 2025-03-07 | End: 2025-03-07 | Stop reason: HOSPADM

## 2025-03-07 RX ORDER — ALBUTEROL SULFATE 0.83 MG/ML
2.5 SOLUTION RESPIRATORY (INHALATION) ONCE AS NEEDED
Status: DISCONTINUED | OUTPATIENT
Start: 2025-03-07 | End: 2025-03-07 | Stop reason: HOSPADM

## 2025-03-07 RX ORDER — LABETALOL HYDROCHLORIDE 5 MG/ML
5 INJECTION, SOLUTION INTRAVENOUS ONCE AS NEEDED
Status: DISCONTINUED | OUTPATIENT
Start: 2025-03-07 | End: 2025-03-07 | Stop reason: HOSPADM

## 2025-03-07 RX ORDER — DOXYCYCLINE 100 MG/1
100 CAPSULE ORAL 2 TIMES DAILY
Qty: 14 CAPSULE | Refills: 0 | Status: SHIPPED | OUTPATIENT
Start: 2025-03-07 | End: 2025-03-14

## 2025-03-07 RX ORDER — DIPHENHYDRAMINE HYDROCHLORIDE 50 MG/ML
12.5 INJECTION INTRAMUSCULAR; INTRAVENOUS ONCE AS NEEDED
Status: DISCONTINUED | OUTPATIENT
Start: 2025-03-07 | End: 2025-03-07 | Stop reason: HOSPADM

## 2025-03-07 RX ORDER — MEPERIDINE HYDROCHLORIDE 25 MG/ML
12.5 INJECTION INTRAMUSCULAR; INTRAVENOUS; SUBCUTANEOUS EVERY 10 MIN PRN
Status: DISCONTINUED | OUTPATIENT
Start: 2025-03-07 | End: 2025-03-07 | Stop reason: HOSPADM

## 2025-03-07 RX ORDER — CEFAZOLIN SODIUM 2 G/100ML
2 INJECTION, SOLUTION INTRAVENOUS ONCE
Status: COMPLETED | OUTPATIENT
Start: 2025-03-07 | End: 2025-03-07

## 2025-03-07 RX ORDER — FENTANYL CITRATE 50 UG/ML
50 INJECTION, SOLUTION INTRAMUSCULAR; INTRAVENOUS EVERY 5 MIN PRN
Status: DISCONTINUED | OUTPATIENT
Start: 2025-03-07 | End: 2025-03-07 | Stop reason: HOSPADM

## 2025-03-07 RX ORDER — FENTANYL CITRATE 50 UG/ML
INJECTION, SOLUTION INTRAMUSCULAR; INTRAVENOUS AS NEEDED
Status: DISCONTINUED | OUTPATIENT
Start: 2025-03-07 | End: 2025-03-07

## 2025-03-07 RX ORDER — ONDANSETRON HYDROCHLORIDE 2 MG/ML
4 INJECTION, SOLUTION INTRAVENOUS ONCE AS NEEDED
Status: DISCONTINUED | OUTPATIENT
Start: 2025-03-07 | End: 2025-03-07 | Stop reason: HOSPADM

## 2025-03-07 RX ORDER — BACITRACIN ZINC 500 UNIT/G
OINTMENT IN PACKET (EA) TOPICAL AS NEEDED
Status: DISCONTINUED | OUTPATIENT
Start: 2025-03-07 | End: 2025-03-07 | Stop reason: HOSPADM

## 2025-03-07 RX ORDER — HYDRALAZINE HYDROCHLORIDE 20 MG/ML
5 INJECTION INTRAMUSCULAR; INTRAVENOUS EVERY 30 MIN PRN
Status: DISCONTINUED | OUTPATIENT
Start: 2025-03-07 | End: 2025-03-07 | Stop reason: HOSPADM

## 2025-03-07 RX ORDER — LIDOCAINE HYDROCHLORIDE 10 MG/ML
INJECTION, SOLUTION INFILTRATION; PERINEURAL AS NEEDED
Status: DISCONTINUED | OUTPATIENT
Start: 2025-03-07 | End: 2025-03-07 | Stop reason: HOSPADM

## 2025-03-07 RX ORDER — DEXTROMETHORPHAN HYDROBROMIDE, GUAIFENESIN 5; 100 MG/5ML; MG/5ML
650 LIQUID ORAL EVERY 8 HOURS PRN
COMMUNITY

## 2025-03-07 RX ORDER — LIDOCAINE HYDROCHLORIDE 10 MG/ML
INJECTION, SOLUTION EPIDURAL; INFILTRATION; INTRACAUDAL; PERINEURAL AS NEEDED
Status: DISCONTINUED | OUTPATIENT
Start: 2025-03-07 | End: 2025-03-07

## 2025-03-07 RX ORDER — PROCHLORPERAZINE EDISYLATE 5 MG/ML
5 INJECTION INTRAMUSCULAR; INTRAVENOUS ONCE AS NEEDED
Status: DISCONTINUED | OUTPATIENT
Start: 2025-03-07 | End: 2025-03-07 | Stop reason: HOSPADM

## 2025-03-07 RX ORDER — SODIUM CHLORIDE, SODIUM LACTATE, POTASSIUM CHLORIDE, CALCIUM CHLORIDE 600; 310; 30; 20 MG/100ML; MG/100ML; MG/100ML; MG/100ML
100 INJECTION, SOLUTION INTRAVENOUS CONTINUOUS
Status: DISCONTINUED | OUTPATIENT
Start: 2025-03-07 | End: 2025-03-07 | Stop reason: HOSPADM

## 2025-03-07 RX ORDER — IPRATROPIUM BROMIDE 0.5 MG/2.5ML
500 SOLUTION RESPIRATORY (INHALATION) AS NEEDED
Status: DISCONTINUED | OUTPATIENT
Start: 2025-03-07 | End: 2025-03-07 | Stop reason: HOSPADM

## 2025-03-07 RX ADMIN — CEFAZOLIN SODIUM 2 G: 2 INJECTION, SOLUTION INTRAVENOUS at 09:26

## 2025-03-07 RX ADMIN — SODIUM CHLORIDE, POTASSIUM CHLORIDE, SODIUM LACTATE AND CALCIUM CHLORIDE: 600; 310; 30; 20 INJECTION, SOLUTION INTRAVENOUS at 09:20

## 2025-03-07 RX ADMIN — PROPOFOL 150 MG: 10 INJECTION, EMULSION INTRAVENOUS at 09:26

## 2025-03-07 RX ADMIN — LIDOCAINE HYDROCHLORIDE 3 ML: 10 INJECTION, SOLUTION EPIDURAL; INFILTRATION; INTRACAUDAL; PERINEURAL at 09:26

## 2025-03-07 RX ADMIN — FENTANYL CITRATE 25 MCG: 0.05 INJECTION, SOLUTION INTRAMUSCULAR; INTRAVENOUS at 09:26

## 2025-03-07 ASSESSMENT — COLUMBIA-SUICIDE SEVERITY RATING SCALE - C-SSRS
1. IN THE PAST MONTH, HAVE YOU WISHED YOU WERE DEAD OR WISHED YOU COULD GO TO SLEEP AND NOT WAKE UP?: NO
2. HAVE YOU ACTUALLY HAD ANY THOUGHTS OF KILLING YOURSELF?: NO

## 2025-03-07 ASSESSMENT — PAIN SCALES - GENERAL
PAINLEVEL_OUTOF10: 0 - NO PAIN
PAINLEVEL_OUTOF10: 3

## 2025-03-07 ASSESSMENT — PAIN - FUNCTIONAL ASSESSMENT
PAIN_FUNCTIONAL_ASSESSMENT: 0-10

## 2025-03-07 NOTE — ANESTHESIA PREPROCEDURE EVALUATION
Patient: July Castillo    Procedure Information       Date/Time: 03/07/25 0950    Procedure: DECOMPRESSION, NERVE, MEDIAN (Right: Hand)    Location: [facility] / [facility]    Surgeons: Gabriele Mariscal MD            Relevant Problems   Anesthesia (within normal limits)      Cardiac   (+) HTN (hypertension)   (+) Hyperlipidemia      Pulmonary (within normal limits)      Neuro   (+) Carpal tunnel syndrome of right wrist   (+) Carpal tunnel syndrome on right   (+) Lumbosacral spondylosis with radiculopathy      GI (within normal limits)      /Renal (within normal limits)      Liver (within normal limits)      Endocrine   (+) Hypothyroidism      Hematology (within normal limits)      Musculoskeletal   (+) Carpal tunnel syndrome of right wrist   (+) Carpal tunnel syndrome on right   (+) Lumbosacral spondylosis with radiculopathy   (+) Primary osteoarthritis of both knees      HEENT (within normal limits)      ID (within normal limits)      Skin (within normal limits)      GYN (within normal limits)       Clinical information reviewed:   Tobacco  Allergies  Meds  Problems  Med Hx  Surg Hx  OB Status    Fam Hx  Soc Hx        NPO Detail:  NPO/Void Status  Date of Last Liquid: 03/07/25  Time of Last Liquid: 0600  Date of Last Solid: 03/06/25  Time of Last Solid: 1800  Time of Last Void: 0830         Physical Exam    Airway  Mallampati: I  TM distance: >3 FB  Neck ROM: full     Cardiovascular    Dental    Pulmonary    Abdominal            Anesthesia Plan    History of general anesthesia?: yes  History of complications of general anesthesia?: no    ASA 2     MAC     intravenous induction   Postoperative administration of opioids is intended.  Anesthetic plan and risks discussed with patient.

## 2025-03-07 NOTE — OP NOTE
RIGHT CARPAL TUNNEL (R) Operative Note     Date: 3/7/2025  OR Location: [facility]    Name: July Castillo, : 1950, Age: 74 y.o., MRN: [id number], Sex: female    Diagnosis  Pre-op Diagnosis      * Carpal tunnel syndrome on right [G56.01] Post-op Diagnosis     * Carpal tunnel syndrome on right [G56.01]     Procedures  RIGHT CARPAL TUNNEL  43081 - SD NEUROPLASTY &/TRANSPOS MEDIAN NRV CARPAL TUNNE  Right open carpal tunnel release    Surgeons      * Gabriele Mariscal - Primary    Resident/Fellow/Other Assistant:  Surgeons and Role:  * No surgeons found with a matching role *    Staff:   Circulator: Demetra  Scrub Person: Ronal Vannub Person: Rachel    Anesthesia Staff: Anesthesiologist: Theodore Swartz MD  CRNA: NORAH Perkins-FROY    Procedure Summary  Anesthesia: Monitor Anesthesia Care  ASA: II  Estimated Blood Loss: 1mL  Intra-op Medications: Administrations occurring from 0950 to 1045 on 25:  * No intraprocedure medications in log *        Specimen: No specimens collected              Drains and/or Catheters: * None in log *    Tourniquet Times:   * Missing tourniquet times found for documented tourniquets in lo *     Implants:     Findings: Moderate nerve swelling    Indications: July Castillo is an 74 y.o. female who is having surgery for Carpal tunnel syndrome on right [G56.01].  Pleasant patient enters today for carpal tunnel lease understand clear there are severe risk such as nerve artery tendon damage infection continued pain in the amount of recovery can be variable goal of surgery decrease compression on the nerve.  Understands risk at length informed consent obtained    The patient was seen in the preoperative area. The risks, benefits, complications, treatment options, non-operative alternatives, expected recovery and outcomes were discussed with the patient. The possibilities of reaction to medication, pulmonary aspiration, injury to surrounding structures, bleeding, recurrent  infection, the need for additional procedures, failure to diagnose a condition, and creating a complication requiring transfusion or operation were discussed with the patient. The patient concurred with the proposed plan, giving informed consent.  The site of surgery was properly noted/marked if necessary per policy. The patient has been actively warmed in preoperative area. Preoperative antibiotics are not indicated. Venous thrombosis prophylaxis are not indicated.  Preoperative antibiotics were given  Procedure Details: Pleasant patient brought the operative room and after sterilely prepping draping form a timeout with placed abundant local and after raising the tourniquet made a 3 cm incision over the transverse carpal ligament going down through the skin bluntly spreading on the ligament.  We opened up the ligament sharply in its central portion and released the distal aspects of superficial fat and proximal with greater than 4 cm forearm fascia there was a moderate amount of nerve swelling no significant synovitis nerve was fully released we copious irrigated closed wound light compressive dressing was placed fingers pinked up nicely no complications  Complications:  None; patient tolerated the procedure well.    Disposition: PACU - hemodynamically stable.  Condition: stable                 Additional Details: 0    Attending Attestation: I performed the procedure.    Gabriele Mariscal  Phone Number: [phone]

## 2025-03-07 NOTE — ANESTHESIA POSTPROCEDURE EVALUATION
Patient: July Castillo    Procedure Summary       Date: 03/07/25 Room / Location: [facility]    Anesthesia Start: 0920 Anesthesia Stop: 0944    Procedure: RIGHT CARPAL TUNNEL (Right: Hand) Diagnosis:       Carpal tunnel syndrome on right      (Carpal tunnel syndrome on right [G56.01])    Surgeons: Gabriele Mariscal MD Responsible Provider: Theodore Swartz MD    Anesthesia Type: MAC ASA Status: 2            Anesthesia Type: MAC    Vitals Value Taken Time   /88 03/07/25 1015   Temp 36.2 °C (97.2 °F) 03/07/25 1015   Pulse 66 03/07/25 1015   Resp 16 03/07/25 1015   SpO2 94 % 03/07/25 1015       Anesthesia Post Evaluation    Patient location during evaluation: PACU  Patient participation: complete - patient participated  Level of consciousness: awake  Pain management: adequate  Multimodal analgesia pain management approach  Airway patency: patent  Cardiovascular status: acceptable and hemodynamically stable  Respiratory status: acceptable and spontaneous ventilation  Hydration status: euvolemic  Postoperative Nausea and Vomiting: none        There were no known notable events for this encounter.

## 2025-03-07 NOTE — DISCHARGE INSTRUCTIONS
You had carpal tunnel surgery today, due to local anesthesia the hand may be numb for a few hours after surgery, this is very normal and should slowly wear off.    Some swelling and bruising in the forearm and palm is very normal after surgery.    Keep dressing on hand for 2-3 days, after that you may take dressing off and put a band-aid over wound.    DO NOT get wound wet for 5 days post-op, after that you can shower/wash hand and get wound wet, pat dry.    If able, take 800mg Ibuprofen and/or Tylenol for pain after surgery if needed.    Use the hand for activities as tolerated, goal is to regain full digital and wrist motion as quickly as possible but avoid heavy lifting until advised.    While some symptoms may improve quickly after surgery, some symptoms take time to slowly improve and the results can be very variable.    Follow up in the office by calling [phone] in 10-13 days for a post-op appt

## 2025-03-17 ENCOUNTER — APPOINTMENT (OUTPATIENT)
Dept: ORTHOPEDIC SURGERY | Facility: CLINIC | Age: 75
End: 2025-03-17
Payer: MEDICARE

## 2025-03-17 DIAGNOSIS — G56.01 CARPAL TUNNEL SYNDROME ON RIGHT: Primary | ICD-10-CM

## 2025-03-17 PROCEDURE — 1125F AMNT PAIN NOTED PAIN PRSNT: CPT | Performed by: ORTHOPAEDIC SURGERY

## 2025-03-17 PROCEDURE — 1159F MED LIST DOCD IN RCRD: CPT | Performed by: ORTHOPAEDIC SURGERY

## 2025-03-17 PROCEDURE — 1157F ADVNC CARE PLAN IN RCRD: CPT | Performed by: ORTHOPAEDIC SURGERY

## 2025-03-17 PROCEDURE — 1123F ACP DISCUSS/DSCN MKR DOCD: CPT | Performed by: ORTHOPAEDIC SURGERY

## 2025-03-17 PROCEDURE — 1160F RVW MEDS BY RX/DR IN RCRD: CPT | Performed by: ORTHOPAEDIC SURGERY

## 2025-03-17 PROCEDURE — 99024 POSTOP FOLLOW-UP VISIT: CPT | Performed by: ORTHOPAEDIC SURGERY

## 2025-03-17 PROCEDURE — 1036F TOBACCO NON-USER: CPT | Performed by: ORTHOPAEDIC SURGERY

## 2025-03-17 ASSESSMENT — PAIN SCALES - GENERAL: PAINLEVEL_OUTOF10: 1

## 2025-03-17 ASSESSMENT — PAIN - FUNCTIONAL ASSESSMENT: PAIN_FUNCTIONAL_ASSESSMENT: 0-10

## 2025-03-17 ASSESSMENT — PATIENT HEALTH QUESTIONNAIRE - PHQ9
2. FEELING DOWN, DEPRESSED OR HOPELESS: NOT AT ALL
1. LITTLE INTEREST OR PLEASURE IN DOING THINGS: NOT AT ALL
SUM OF ALL RESPONSES TO PHQ9 QUESTIONS 1 AND 2: 0

## 2025-03-17 NOTE — PROGRESS NOTES
Reason for Appointment  Chief Complaint   Patient presents with    Right Wrist - Post-op     History of Present Illness  Patient is here today 2 weeks s/p right carpal tunnel release on 3/7/25. Patient is doing well overall. Sutures were removed. Incisions and wound look excellent. No signs of infection. The pt understands the postoperative protocol.       Assessment   Right carpal tunnel    I, Willow Wallace, attest that this documentation has been prepared under the direction and in the presence of Gabriele Mariscal MD.   By signing below, I, Gabriele Mariscal MD, personally performed the services described in this documentation. All medical record entries made by the scribe were at my direction and in my presence. I have reviewed the chart and agree that the record reflects my personal performance and is accurate and complete.

## 2025-03-18 ENCOUNTER — TELEPHONE (OUTPATIENT)
Dept: PHYSICAL MEDICINE AND REHAB | Facility: CLINIC | Age: 75
End: 2025-03-18
Payer: MEDICARE

## 2025-03-18 DIAGNOSIS — G89.29 CHRONIC PAIN OF BOTH KNEES: ICD-10-CM

## 2025-03-18 DIAGNOSIS — M25.561 CHRONIC PAIN OF BOTH KNEES: ICD-10-CM

## 2025-03-18 DIAGNOSIS — M25.562 CHRONIC PAIN OF BOTH KNEES: ICD-10-CM

## 2025-03-18 DIAGNOSIS — M17.0 PRIMARY OSTEOARTHRITIS OF BOTH KNEES: ICD-10-CM

## 2025-03-18 RX ORDER — SODIUM HYALURONATE INTRA-ARTICULAR SOLN PREF SYR 25 MG/2.5ML 25/2.5 MG/ML
SOLUTION PREFILLED SYRINGE INTRAARTICULAR
Qty: 20 ML | Refills: 0 | Status: SHIPPED | OUTPATIENT
Start: 2025-03-18

## 2025-03-18 NOTE — TELEPHONE ENCOUNTER
Patient calling because she is ready to start gel injections for her knees.     Supartz gel inections, says she previously discussed them with you, is there a PA process to see if her new insurance will cover this? And for scheduling is this a 30 or 15 min visit and is it for both knees or one at a time?

## 2025-03-20 ENCOUNTER — APPOINTMENT (OUTPATIENT)
Dept: NEUROSURGERY | Facility: CLINIC | Age: 75
End: 2025-03-20
Payer: MEDICARE

## 2025-03-26 NOTE — PROGRESS NOTES
July Castillo is a 74 year old woman who presents 10 month s/p right L3-5 facetectomies, combined posterolateral and transforaminal interbody instrumented fusion (TLIF) 5/24/24.   X-ray lumbar spine performed today shows hardware in good position, good alignment, and evidence of interbody arthrodesis. She has zero pain and is doing excellent. She is intact on exam. She can see me as needed from here on out.     Demetrius Wright MD

## 2025-03-27 ENCOUNTER — HOSPITAL ENCOUNTER (OUTPATIENT)
Dept: RADIOLOGY | Facility: HOSPITAL | Age: 75
Discharge: HOME | End: 2025-03-27
Payer: MEDICARE

## 2025-03-27 ENCOUNTER — APPOINTMENT (OUTPATIENT)
Dept: NEUROSURGERY | Facility: CLINIC | Age: 75
End: 2025-03-27
Payer: MEDICARE

## 2025-03-27 VITALS
HEART RATE: 62 BPM | SYSTOLIC BLOOD PRESSURE: 150 MMHG | BODY MASS INDEX: 24.24 KG/M2 | DIASTOLIC BLOOD PRESSURE: 86 MMHG | HEIGHT: 64 IN | WEIGHT: 142 LBS

## 2025-03-27 DIAGNOSIS — M48.061 SPINAL STENOSIS OF LUMBAR REGION, UNSPECIFIED WHETHER NEUROGENIC CLAUDICATION PRESENT: ICD-10-CM

## 2025-03-27 DIAGNOSIS — G91.0 HYDROCEPHALUS, COMMUNICATING (MULTI): ICD-10-CM

## 2025-03-27 DIAGNOSIS — Z98.1 S/P LUMBAR FUSION: ICD-10-CM

## 2025-03-27 PROCEDURE — 3077F SYST BP >= 140 MM HG: CPT | Performed by: NEUROLOGICAL SURGERY

## 2025-03-27 PROCEDURE — 3079F DIAST BP 80-89 MM HG: CPT | Performed by: NEUROLOGICAL SURGERY

## 2025-03-27 PROCEDURE — 1126F AMNT PAIN NOTED NONE PRSNT: CPT | Performed by: NEUROLOGICAL SURGERY

## 2025-03-27 PROCEDURE — 1157F ADVNC CARE PLAN IN RCRD: CPT | Performed by: NEUROLOGICAL SURGERY

## 2025-03-27 PROCEDURE — 1123F ACP DISCUSS/DSCN MKR DOCD: CPT | Performed by: NEUROLOGICAL SURGERY

## 2025-03-27 PROCEDURE — 72100 X-RAY EXAM L-S SPINE 2/3 VWS: CPT

## 2025-03-27 PROCEDURE — 3008F BODY MASS INDEX DOCD: CPT | Performed by: NEUROLOGICAL SURGERY

## 2025-03-27 PROCEDURE — 1159F MED LIST DOCD IN RCRD: CPT | Performed by: NEUROLOGICAL SURGERY

## 2025-03-27 PROCEDURE — 99213 OFFICE O/P EST LOW 20 MIN: CPT | Performed by: NEUROLOGICAL SURGERY

## 2025-03-27 ASSESSMENT — PAIN SCALES - GENERAL: PAINLEVEL_OUTOF10: 0-NO PAIN

## 2025-04-07 ENCOUNTER — TELEPHONE (OUTPATIENT)
Dept: PHYSICAL MEDICINE AND REHAB | Facility: CLINIC | Age: 75
End: 2025-04-07
Payer: MEDICARE

## 2025-04-07 DIAGNOSIS — L98.9 SORE ON SCALP: ICD-10-CM

## 2025-04-07 NOTE — TELEPHONE ENCOUNTER
Spoke with the Pharmacy Eleonora @ [employer] (Ph: [phone]): Insurance has received the Pre-certification request for the drug SUPARTZ FX ([id number]) on 04/01/2025 and it has been approved. It is effective from 04/01/2025 valid and billable till 04/01/2026. Approved Case ID: [id number]. For further information they transferred the call to their PA Dept and as per them they do not assist to third party callers. Rep: Mona CARLSON    Pt scheduled x 5 visits

## 2025-04-08 RX ORDER — CLINDAMYCIN PHOSPHATE 11.9 MG/ML
SOLUTION TOPICAL 2 TIMES DAILY
Qty: 60 ML | Refills: 11 | Status: SHIPPED | OUTPATIENT
Start: 2025-04-08 | End: 2026-04-08

## 2025-04-09 ENCOUNTER — OFFICE VISIT (OUTPATIENT)
Dept: PRIMARY CARE | Facility: CLINIC | Age: 75
End: 2025-04-09
Payer: MEDICARE

## 2025-04-09 VITALS
BODY MASS INDEX: 25.1 KG/M2 | HEIGHT: 64 IN | SYSTOLIC BLOOD PRESSURE: 136 MMHG | TEMPERATURE: 97.7 F | WEIGHT: 147 LBS | RESPIRATION RATE: 18 BRPM | HEART RATE: 68 BPM | DIASTOLIC BLOOD PRESSURE: 82 MMHG | OXYGEN SATURATION: 95 %

## 2025-04-09 DIAGNOSIS — E55.9 VITAMIN D DEFICIENCY: ICD-10-CM

## 2025-04-09 DIAGNOSIS — Z12.31 ENCOUNTER FOR SCREENING MAMMOGRAM FOR MALIGNANT NEOPLASM OF BREAST: ICD-10-CM

## 2025-04-09 DIAGNOSIS — E03.9 HYPOTHYROIDISM, UNSPECIFIED TYPE: ICD-10-CM

## 2025-04-09 DIAGNOSIS — Z11.59 ENCOUNTER FOR HEPATITIS C SCREENING TEST FOR LOW RISK PATIENT: ICD-10-CM

## 2025-04-09 DIAGNOSIS — E78.5 HYPERLIPIDEMIA, UNSPECIFIED HYPERLIPIDEMIA TYPE: Primary | ICD-10-CM

## 2025-04-09 DIAGNOSIS — Z12.11 SCREEN FOR COLON CANCER: ICD-10-CM

## 2025-04-09 DIAGNOSIS — Z13.220 SCREENING, LIPID: ICD-10-CM

## 2025-04-09 DIAGNOSIS — G89.29 CHRONIC PAIN OF RIGHT KNEE: ICD-10-CM

## 2025-04-09 DIAGNOSIS — E03.9 ACQUIRED HYPOTHYROIDISM: ICD-10-CM

## 2025-04-09 DIAGNOSIS — R53.83 OTHER FATIGUE: ICD-10-CM

## 2025-04-09 DIAGNOSIS — M25.561 CHRONIC PAIN OF RIGHT KNEE: ICD-10-CM

## 2025-04-09 DIAGNOSIS — Z00.00 WELL ADULT EXAM: ICD-10-CM

## 2025-04-09 DIAGNOSIS — I10 HYPERTENSION, UNSPECIFIED TYPE: ICD-10-CM

## 2025-04-09 PROCEDURE — 1170F FXNL STATUS ASSESSED: CPT | Performed by: NURSE PRACTITIONER

## 2025-04-09 PROCEDURE — 1126F AMNT PAIN NOTED NONE PRSNT: CPT | Performed by: NURSE PRACTITIONER

## 2025-04-09 PROCEDURE — G0439 PPPS, SUBSEQ VISIT: HCPCS | Performed by: NURSE PRACTITIONER

## 2025-04-09 PROCEDURE — 1159F MED LIST DOCD IN RCRD: CPT | Performed by: NURSE PRACTITIONER

## 2025-04-09 PROCEDURE — 3075F SYST BP GE 130 - 139MM HG: CPT | Performed by: NURSE PRACTITIONER

## 2025-04-09 PROCEDURE — 1036F TOBACCO NON-USER: CPT | Performed by: NURSE PRACTITIONER

## 2025-04-09 PROCEDURE — 99213 OFFICE O/P EST LOW 20 MIN: CPT | Performed by: NURSE PRACTITIONER

## 2025-04-09 PROCEDURE — 99215 OFFICE O/P EST HI 40 MIN: CPT | Performed by: NURSE PRACTITIONER

## 2025-04-09 PROCEDURE — 1123F ACP DISCUSS/DSCN MKR DOCD: CPT | Performed by: NURSE PRACTITIONER

## 2025-04-09 PROCEDURE — 3008F BODY MASS INDEX DOCD: CPT | Performed by: NURSE PRACTITIONER

## 2025-04-09 PROCEDURE — 3079F DIAST BP 80-89 MM HG: CPT | Performed by: NURSE PRACTITIONER

## 2025-04-09 PROCEDURE — 99213 OFFICE O/P EST LOW 20 MIN: CPT | Mod: 25 | Performed by: NURSE PRACTITIONER

## 2025-04-09 PROCEDURE — 1157F ADVNC CARE PLAN IN RCRD: CPT | Performed by: NURSE PRACTITIONER

## 2025-04-09 RX ORDER — LEVOTHYROXINE SODIUM 88 UG/1
88 TABLET ORAL DAILY
Qty: 90 TABLET | Refills: 3 | Status: SHIPPED | OUTPATIENT
Start: 2025-04-09 | End: 2025-04-12 | Stop reason: DRUGHIGH

## 2025-04-09 RX ORDER — ATORVASTATIN CALCIUM 20 MG/1
20 TABLET, FILM COATED ORAL DAILY
Qty: 180 TABLET | Refills: 1 | Status: SHIPPED | OUTPATIENT
Start: 2025-04-09

## 2025-04-09 RX ORDER — LISINOPRIL 10 MG/1
10 TABLET ORAL DAILY
Qty: 90 TABLET | Refills: 1 | Status: SHIPPED | OUTPATIENT
Start: 2025-04-09

## 2025-04-09 ASSESSMENT — ACTIVITIES OF DAILY LIVING (ADL)
DRESSING: INDEPENDENT
GROCERY_SHOPPING: INDEPENDENT
DOING_HOUSEWORK: INDEPENDENT
TAKING_MEDICATION: INDEPENDENT
BATHING: INDEPENDENT
MANAGING_FINANCES: INDEPENDENT

## 2025-04-09 ASSESSMENT — LIFESTYLE VARIABLES
AUDIT-C TOTAL SCORE: 2
HOW OFTEN DO YOU HAVE SIX OR MORE DRINKS ON ONE OCCASION: NEVER
HOW OFTEN DO YOU HAVE A DRINK CONTAINING ALCOHOL: 2-4 TIMES A MONTH
HOW MANY STANDARD DRINKS CONTAINING ALCOHOL DO YOU HAVE ON A TYPICAL DAY: 1 OR 2
SKIP TO QUESTIONS 9-10: 1

## 2025-04-09 ASSESSMENT — PATIENT HEALTH QUESTIONNAIRE - PHQ9
2. FEELING DOWN, DEPRESSED OR HOPELESS: NOT AT ALL
SUM OF ALL RESPONSES TO PHQ9 QUESTIONS 1 AND 2: 0
1. LITTLE INTEREST OR PLEASURE IN DOING THINGS: NOT AT ALL

## 2025-04-09 ASSESSMENT — PAIN SCALES - GENERAL: PAINLEVEL_OUTOF10: 0-NO PAIN

## 2025-04-09 NOTE — PROGRESS NOTES
"Subjective   Patient ID: July Castillo is a 74 y.o. female who presents for Medicare Annual Wellness Visit Subsequent (PT IS HERE FOR WELLNESS EXAM ).    HAS CARPEL TUNNEL  HERE WELL VISIT, SEES [facility] FOR KNEE INJECTIONS,  DOING WELL  AFTER SPINAL FUSION       Review of Systems   All other systems reviewed and are negative.      Objective   /82   Pulse 68   Temp 36.5 °C (97.7 °F)   Resp 18   Ht 1.626 m (5' 4\")   Wt 66.7 kg (147 lb)   LMP  (LMP Unknown)   SpO2 95%   BMI 25.23 kg/m²     Physical Exam  Constitutional:       Appearance: Normal appearance.   HENT:      Head: Atraumatic.      Right Ear: Tympanic membrane normal.      Left Ear: Tympanic membrane normal.      Nose: Nose normal.      Mouth/Throat:      Mouth: Mucous membranes are moist.   Eyes:      Conjunctiva/sclera: Conjunctivae normal.   Cardiovascular:      Rate and Rhythm: Normal rate and regular rhythm.      Pulses: Normal pulses.      Heart sounds: Normal heart sounds.   Pulmonary:      Effort: Pulmonary effort is normal.   Abdominal:      General: Bowel sounds are normal.   Genitourinary:     Comments: BREAST EXAMNL  Skin:     General: Skin is warm and dry.   Neurological:      General: No focal deficit present.      Mental Status: She is oriented to person, place, and time.   Psychiatric:         Behavior: Behavior normal.       Assessment/Plan   Problem List Items Addressed This Visit             ICD-10-CM    HTN (hypertension) I10    Hyperlipidemia - Primary E78.5    Relevant Orders    Comprehensive Metabolic Panel    Hypothyroidism E03.9    Relevant Orders    TSH with reflex to Free T4 if abnormal    Chronic pain of right knee M25.561, G89.29     Other Visit Diagnoses         Codes    Encounter for screening mammogram for malignant neoplasm of breast     Z12.31    Relevant Orders    BI mammo bilateral screening tomosynthesis    Well adult exam     Z00.00    Other fatigue     R53.83    Relevant Orders    CBC and Auto " Differential    Vitamin D deficiency     E55.9    Relevant Orders    Vitamin D 25-Hydroxy,Total (for eval of Vitamin D levels)    Screening, lipid     Z13.220    Relevant Orders    Lipid Panel    Encounter for hepatitis C screening test for low risk patient     Z11.59    Relevant Orders    Hepatitis C antibody    Screen for colon cancer     Z12.11    Relevant Orders    Referral to Gastroenterology

## 2025-04-09 NOTE — PROGRESS NOTES
"Subjective   Patient ID: July Castillo is a 74 y.o. female who presents for Medicare Annual Wellness Visit Subsequent (PT IS HERE FOR WELLNESS EXAM ).    Here for a well visit. Loves being here, went to [place] to see her son, lanids her! Looks great         Review of Systems   All other systems reviewed and are negative.      Objective   /82   Pulse 68   Temp 36.5 °C (97.7 °F)   Resp 18   Ht 1.626 m (5' 4\")   Wt 66.7 kg (147 lb)   LMP  (LMP Unknown)   SpO2 95%   BMI 25.23 kg/m²     Physical Exam  Vitals and nursing note reviewed.   Constitutional:       General: She is not in acute distress.  HENT:      Right Ear: Tympanic membrane and ear canal normal.      Left Ear: Tympanic membrane and ear canal normal.      Nose: Nose normal. No rhinorrhea.      Mouth/Throat:      Pharynx: Oropharynx is clear. No oropharyngeal exudate or posterior oropharyngeal erythema.      Comments: Dentition wnl  Eyes:      Extraocular Movements: Extraocular movements intact.      Conjunctiva/sclera: Conjunctivae normal.      Pupils: Pupils are equal, round, and reactive to light.   Neck:      Vascular: No carotid bruit.   Cardiovascular:      Rate and Rhythm: Normal rate and regular rhythm.      Heart sounds: Normal heart sounds. No murmur heard.  Pulmonary:      Breath sounds: Normal breath sounds. No wheezing or rhonchi.   Abdominal:      General: Bowel sounds are normal. There is no distension.      Palpations: Abdomen is soft. There is no mass.      Tenderness: There is no abdominal tenderness. There is no guarding or rebound.      Hernia: No hernia is present.   Genitourinary:     Comments: Breast exam nl  Musculoskeletal:         General: No swelling or tenderness. Normal range of motion.      Cervical back: Normal range of motion and neck supple.   Lymphadenopathy:      Cervical: No cervical adenopathy.   Skin:     General: Skin is warm.      Findings: No rash.   Neurological:      General: No focal deficit " present.      Mental Status: She is alert and oriented to person, place, and time.   Psychiatric:         Behavior: Behavior normal.         Assessment/Plan   Problem List Items Addressed This Visit             ICD-10-CM    HTN (hypertension) I10    Relevant Medications    lisinopril 10 mg tablet    Hyperlipidemia - Primary E78.5    Relevant Medications    atorvastatin (Lipitor) 20 mg tablet    Other Relevant Orders    Comprehensive Metabolic Panel    Hypothyroidism E03.9    Relevant Medications    levothyroxine (Synthroid, Levoxyl) 88 mcg tablet    Other Relevant Orders    TSH with reflex to Free T4 if abnormal    Chronic pain of right knee M25.561, G89.29     Other Visit Diagnoses         Codes    Encounter for screening mammogram for malignant neoplasm of breast     Z12.31    Relevant Orders    BI mammo bilateral screening tomosynthesis    Well adult exam     Z00.00    Other fatigue     R53.83    Relevant Orders    CBC and Auto Differential    Vitamin D deficiency     E55.9    Relevant Orders    Vitamin D 25-Hydroxy,Total (for eval of Vitamin D levels)    Screening, lipid     Z13.220    Relevant Orders    Lipid Panel    Encounter for hepatitis C screening test for low risk patient     Z11.59    Relevant Orders    Hepatitis C Antibody    Screen for colon cancer     Z12.11    Relevant Orders    Referral to Gastroenterology

## 2025-04-11 LAB
25(OH)D3+25(OH)D2 SERPL-MCNC: 43 NG/ML (ref 30–100)
HCV AB SERPL QL IA: NORMAL

## 2025-04-12 DIAGNOSIS — E03.9 ACQUIRED HYPOTHYROIDISM: Primary | ICD-10-CM

## 2025-04-12 LAB
ALBUMIN SERPL-MCNC: 4.7 G/DL (ref 3.6–5.1)
ALP SERPL-CCNC: 65 U/L (ref 37–153)
ALT SERPL-CCNC: 9 U/L (ref 6–29)
ANION GAP SERPL CALCULATED.4IONS-SCNC: 10 MMOL/L (CALC) (ref 7–17)
AST SERPL-CCNC: 15 U/L (ref 10–35)
BASOPHILS # BLD AUTO: 42 CELLS/UL (ref 0–200)
BASOPHILS NFR BLD AUTO: 0.4 %
BILIRUB SERPL-MCNC: 0.5 MG/DL (ref 0.2–1.2)
BUN SERPL-MCNC: 15 MG/DL (ref 7–25)
CALCIUM SERPL-MCNC: 10.1 MG/DL (ref 8.6–10.4)
CHLORIDE SERPL-SCNC: 103 MMOL/L (ref 98–110)
CHOLEST SERPL-MCNC: 196 MG/DL
CHOLEST/HDLC SERPL: 2.4 (CALC)
CO2 SERPL-SCNC: 26 MMOL/L (ref 20–32)
CREAT SERPL-MCNC: 0.84 MG/DL (ref 0.6–1)
EGFRCR SERPLBLD CKD-EPI 2021: 73 ML/MIN/1.73M2
EOSINOPHIL # BLD AUTO: 116 CELLS/UL (ref 15–500)
EOSINOPHIL NFR BLD AUTO: 1.1 %
ERYTHROCYTE [DISTWIDTH] IN BLOOD BY AUTOMATED COUNT: 12.7 % (ref 11–15)
GLUCOSE SERPL-MCNC: 93 MG/DL (ref 65–99)
HCT VFR BLD AUTO: 40.1 % (ref 35–45)
HDLC SERPL-MCNC: 83 MG/DL
HGB BLD-MCNC: 13.3 G/DL (ref 11.7–15.5)
LDLC SERPL CALC-MCNC: 90 MG/DL (CALC)
LYMPHOCYTES # BLD AUTO: 2195 CELLS/UL (ref 850–3900)
LYMPHOCYTES NFR BLD AUTO: 20.9 %
MCH RBC QN AUTO: 30.6 PG (ref 27–33)
MCHC RBC AUTO-ENTMCNC: 33.2 G/DL (ref 32–36)
MCV RBC AUTO: 92.4 FL (ref 80–100)
MONOCYTES # BLD AUTO: 651 CELLS/UL (ref 200–950)
MONOCYTES NFR BLD AUTO: 6.2 %
NEUTROPHILS # BLD AUTO: 7497 CELLS/UL (ref 1500–7800)
NEUTROPHILS NFR BLD AUTO: 71.4 %
NONHDLC SERPL-MCNC: 113 MG/DL (CALC)
PLATELET # BLD AUTO: 253 THOUSAND/UL (ref 140–400)
PMV BLD REES-ECKER: 12.6 FL (ref 7.5–12.5)
POTASSIUM SERPL-SCNC: 3.9 MMOL/L (ref 3.5–5.3)
PROT SERPL-MCNC: 7 G/DL (ref 6.1–8.1)
RBC # BLD AUTO: 4.34 MILLION/UL (ref 3.8–5.1)
SODIUM SERPL-SCNC: 139 MMOL/L (ref 135–146)
T4 FREE SERPL-MCNC: 2 NG/DL (ref 0.8–1.8)
TRIGL SERPL-MCNC: 132 MG/DL
TSH SERPL-ACNC: 0.27 MIU/L (ref 0.4–4.5)
WBC # BLD AUTO: 10.5 THOUSAND/UL (ref 3.8–10.8)

## 2025-04-12 RX ORDER — LEVOTHYROXINE SODIUM 75 UG/1
75 TABLET ORAL DAILY
Qty: 90 TABLET | Refills: 0 | Status: SHIPPED | OUTPATIENT
Start: 2025-04-12 | End: 2026-04-12

## 2025-04-14 DIAGNOSIS — I10 HYPERTENSION, UNSPECIFIED TYPE: ICD-10-CM

## 2025-04-14 DIAGNOSIS — E78.5 HYPERLIPIDEMIA, UNSPECIFIED HYPERLIPIDEMIA TYPE: ICD-10-CM

## 2025-04-14 RX ORDER — ATORVASTATIN CALCIUM 20 MG/1
20 TABLET, FILM COATED ORAL DAILY
Qty: 180 TABLET | Refills: 1 | OUTPATIENT
Start: 2025-04-14

## 2025-04-14 RX ORDER — LISINOPRIL 10 MG/1
10 TABLET ORAL DAILY
Qty: 90 TABLET | Refills: 1 | OUTPATIENT
Start: 2025-04-14

## 2025-04-22 ENCOUNTER — APPOINTMENT (OUTPATIENT)
Dept: RADIOLOGY | Facility: HOSPITAL | Age: 75
End: 2025-04-22
Payer: MEDICARE

## 2025-04-29 ENCOUNTER — OFFICE VISIT (OUTPATIENT)
Dept: PRIMARY CARE | Facility: CLINIC | Age: 75
End: 2025-04-29
Payer: MEDICARE

## 2025-04-29 VITALS
HEART RATE: 67 BPM | TEMPERATURE: 98 F | BODY MASS INDEX: 25.37 KG/M2 | OXYGEN SATURATION: 98 % | WEIGHT: 148.6 LBS | RESPIRATION RATE: 16 BRPM | HEIGHT: 64 IN | SYSTOLIC BLOOD PRESSURE: 126 MMHG | DIASTOLIC BLOOD PRESSURE: 84 MMHG

## 2025-04-29 DIAGNOSIS — R05.1 ACUTE COUGH: ICD-10-CM

## 2025-04-29 DIAGNOSIS — H66.90 ACUTE OTITIS MEDIA, UNSPECIFIED OTITIS MEDIA TYPE: Primary | ICD-10-CM

## 2025-04-29 PROCEDURE — 1160F RVW MEDS BY RX/DR IN RCRD: CPT | Performed by: REGISTERED NURSE

## 2025-04-29 PROCEDURE — 3074F SYST BP LT 130 MM HG: CPT | Performed by: REGISTERED NURSE

## 2025-04-29 PROCEDURE — 1159F MED LIST DOCD IN RCRD: CPT | Performed by: REGISTERED NURSE

## 2025-04-29 PROCEDURE — 1123F ACP DISCUSS/DSCN MKR DOCD: CPT | Performed by: REGISTERED NURSE

## 2025-04-29 PROCEDURE — 1036F TOBACCO NON-USER: CPT | Performed by: REGISTERED NURSE

## 2025-04-29 PROCEDURE — 3008F BODY MASS INDEX DOCD: CPT | Performed by: REGISTERED NURSE

## 2025-04-29 PROCEDURE — 99213 OFFICE O/P EST LOW 20 MIN: CPT | Performed by: REGISTERED NURSE

## 2025-04-29 PROCEDURE — 1157F ADVNC CARE PLAN IN RCRD: CPT | Performed by: REGISTERED NURSE

## 2025-04-29 PROCEDURE — 1126F AMNT PAIN NOTED NONE PRSNT: CPT | Performed by: REGISTERED NURSE

## 2025-04-29 PROCEDURE — 3079F DIAST BP 80-89 MM HG: CPT | Performed by: REGISTERED NURSE

## 2025-04-29 RX ORDER — AMOXICILLIN AND CLAVULANATE POTASSIUM 875; 125 MG/1; MG/1
875 TABLET, FILM COATED ORAL 2 TIMES DAILY
Qty: 20 TABLET | Refills: 0 | Status: SHIPPED | OUTPATIENT
Start: 2025-04-29 | End: 2025-05-09

## 2025-04-29 RX ORDER — BENZONATATE 200 MG/1
200 CAPSULE ORAL 3 TIMES DAILY PRN
Qty: 42 CAPSULE | Refills: 0 | Status: SHIPPED | OUTPATIENT
Start: 2025-04-29 | End: 2025-05-29

## 2025-04-29 ASSESSMENT — ENCOUNTER SYMPTOMS: COUGH: 1

## 2025-04-29 ASSESSMENT — LIFESTYLE VARIABLES
HOW OFTEN DO YOU HAVE SIX OR MORE DRINKS ON ONE OCCASION: NEVER
HOW OFTEN DO YOU HAVE A DRINK CONTAINING ALCOHOL: 2-4 TIMES A MONTH
SKIP TO QUESTIONS 9-10: 1
HOW MANY STANDARD DRINKS CONTAINING ALCOHOL DO YOU HAVE ON A TYPICAL DAY: 1 OR 2
AUDIT-C TOTAL SCORE: 2

## 2025-04-29 ASSESSMENT — PAIN SCALES - GENERAL: PAINLEVEL_OUTOF10: 0-NO PAIN

## 2025-04-29 NOTE — PROGRESS NOTES
"Answers submitted by the patient for this visit:  Cough Questionnaire (Submitted on 4/29/2025)  Chief Complaint: Cough  Chronicity: new  Onset: in the past 7 days  Progression since onset: waxing and waning  Frequency: constantly  Cough characteristics: productive of sputum  ear congestion: Yes  ear pain: Yes  nasal congestion: Yes  postnasal drip: Yes  Aggravated by: lying down  Subjective   Patient ID: July Castillo is a 75 y.o. female who presents for right ear pain (Pt c/o right ear pain, cough, nasal congestion, swollen glands x 5 days).    Cough  This is a new problem. The current episode started in the past 7 days. The problem has been waxing and waning. The problem occurs constantly. The cough is Productive of sputum. Associated symptoms include ear congestion, ear pain, nasal congestion and postnasal drip. The symptoms are aggravated by lying down.        Review of Systems   HENT:  Positive for ear pain and postnasal drip.    Respiratory:  Positive for cough.        Objective   /84   Pulse 67   Temp 36.7 °C (98 °F)   Resp 16   Ht 1.626 m (5' 4\")   Wt 67.4 kg (148 lb 9.6 oz)   LMP  (LMP Unknown)   SpO2 98%   BMI 25.51 kg/m²     Physical Exam  Vitals reviewed.   Constitutional:       Appearance: Normal appearance.   HENT:      Right Ear: Ear canal and external ear normal.      Left Ear: Ear canal and external ear normal.      Nose: Congestion present.      Mouth/Throat:      Mouth: Mucous membranes are moist.      Pharynx: Posterior oropharyngeal erythema present.   Cardiovascular:      Rate and Rhythm: Normal rate.      Pulses: Normal pulses.   Pulmonary:      Effort: Pulmonary effort is normal.   Neurological:      Mental Status: She is alert.   Psychiatric:         Mood and Affect: Mood normal.         Behavior: Behavior normal.         Assessment/Plan   Problem List Items Addressed This Visit    None         "

## 2025-05-02 ENCOUNTER — OFFICE VISIT (OUTPATIENT)
Dept: PRIMARY CARE | Facility: CLINIC | Age: 75
End: 2025-05-02
Payer: MEDICARE

## 2025-05-02 VITALS
SYSTOLIC BLOOD PRESSURE: 136 MMHG | WEIGHT: 147.7 LBS | TEMPERATURE: 98 F | RESPIRATION RATE: 16 BRPM | DIASTOLIC BLOOD PRESSURE: 78 MMHG | BODY MASS INDEX: 25.22 KG/M2 | OXYGEN SATURATION: 99 % | HEIGHT: 64 IN | HEART RATE: 80 BPM

## 2025-05-02 DIAGNOSIS — M17.0 PRIMARY OSTEOARTHRITIS OF BOTH KNEES: Primary | ICD-10-CM

## 2025-05-02 PROCEDURE — 1159F MED LIST DOCD IN RCRD: CPT | Performed by: REGISTERED NURSE

## 2025-05-02 PROCEDURE — 3075F SYST BP GE 130 - 139MM HG: CPT | Performed by: REGISTERED NURSE

## 2025-05-02 PROCEDURE — G2211 COMPLEX E/M VISIT ADD ON: HCPCS | Performed by: REGISTERED NURSE

## 2025-05-02 PROCEDURE — 1036F TOBACCO NON-USER: CPT | Performed by: REGISTERED NURSE

## 2025-05-02 PROCEDURE — 99214 OFFICE O/P EST MOD 30 MIN: CPT | Performed by: REGISTERED NURSE

## 2025-05-02 PROCEDURE — 1160F RVW MEDS BY RX/DR IN RCRD: CPT | Performed by: REGISTERED NURSE

## 2025-05-02 PROCEDURE — 3078F DIAST BP <80 MM HG: CPT | Performed by: REGISTERED NURSE

## 2025-05-02 PROCEDURE — 1157F ADVNC CARE PLAN IN RCRD: CPT | Performed by: REGISTERED NURSE

## 2025-05-02 PROCEDURE — 3008F BODY MASS INDEX DOCD: CPT | Performed by: REGISTERED NURSE

## 2025-05-02 PROCEDURE — 1123F ACP DISCUSS/DSCN MKR DOCD: CPT | Performed by: REGISTERED NURSE

## 2025-05-02 PROCEDURE — 1125F AMNT PAIN NOTED PAIN PRSNT: CPT | Performed by: REGISTERED NURSE

## 2025-05-02 RX ORDER — METHYLPREDNISOLONE 4 MG/1
TABLET ORAL
Qty: 21 TABLET | Refills: 0 | Status: SHIPPED | OUTPATIENT
Start: 2025-05-02

## 2025-05-02 ASSESSMENT — ENCOUNTER SYMPTOMS
ARTHRALGIAS: 1
JOINT SWELLING: 1

## 2025-05-02 ASSESSMENT — PAIN SCALES - GENERAL: PAINLEVEL_OUTOF10: 8

## 2025-05-02 NOTE — PROGRESS NOTES
"Subjective   Patient ID: July Castillo is a 75 y.o. female who presents for Follow-up (Pt here for left knee pain. Pt is requesting medrol dose connor).    HPI   Pt here with chronic pain in left knee  Review of Systems   Musculoskeletal:  Positive for arthralgias and joint swelling.   All other systems reviewed and are negative.      Objective   /78   Pulse 80   Temp 36.7 °C (98 °F)   Resp 16   Ht 1.626 m (5' 4\")   Wt 67 kg (147 lb 11.2 oz)   LMP  (LMP Unknown)   SpO2 99%   BMI 25.35 kg/m²     Physical Exam  Vitals reviewed.   Constitutional:       Appearance: Normal appearance.   Cardiovascular:      Rate and Rhythm: Normal rate.      Pulses: Normal pulses.   Pulmonary:      Effort: Pulmonary effort is normal.   Musculoskeletal:         General: Swelling and tenderness present.   Neurological:      Mental Status: She is alert.   Psychiatric:         Mood and Affect: Mood normal.         Behavior: Behavior normal.         Assessment/Plan   Problem List Items Addressed This Visit           ICD-10-CM    Primary osteoarthritis of both knees - Primary M17.0    Relevant Medications    methylPREDNISolone (Medrol Dospak) 4 mg tablets          "

## 2025-05-23 DIAGNOSIS — E78.5 HYPERLIPIDEMIA, UNSPECIFIED HYPERLIPIDEMIA TYPE: ICD-10-CM

## 2025-05-26 ENCOUNTER — HOSPITAL ENCOUNTER (OUTPATIENT)
Dept: RADIOLOGY | Facility: HOSPITAL | Age: 75
Discharge: HOME | End: 2025-05-26
Payer: MEDICARE

## 2025-05-26 ENCOUNTER — OFFICE VISIT (OUTPATIENT)
Dept: URGENT CARE | Age: 75
End: 2025-05-26
Payer: MEDICARE

## 2025-05-26 VITALS
OXYGEN SATURATION: 97 % | RESPIRATION RATE: 16 BRPM | DIASTOLIC BLOOD PRESSURE: 82 MMHG | SYSTOLIC BLOOD PRESSURE: 142 MMHG | BODY MASS INDEX: 24.55 KG/M2 | HEART RATE: 71 BPM | TEMPERATURE: 97.4 F | WEIGHT: 143 LBS

## 2025-05-26 DIAGNOSIS — M19.032 ARTHRITIS OF LEFT WRIST: Primary | ICD-10-CM

## 2025-05-26 DIAGNOSIS — R03.0 ELEVATED BLOOD PRESSURE READING: ICD-10-CM

## 2025-05-26 PROCEDURE — 3077F SYST BP >= 140 MM HG: CPT

## 2025-05-26 PROCEDURE — 73110 X-RAY EXAM OF WRIST: CPT | Mod: LEFT SIDE | Performed by: RADIOLOGY

## 2025-05-26 PROCEDURE — 73110 X-RAY EXAM OF WRIST: CPT | Mod: LT

## 2025-05-26 PROCEDURE — 1159F MED LIST DOCD IN RCRD: CPT

## 2025-05-26 PROCEDURE — 3079F DIAST BP 80-89 MM HG: CPT

## 2025-05-26 PROCEDURE — 99213 OFFICE O/P EST LOW 20 MIN: CPT

## 2025-05-26 RX ORDER — PREDNISONE 20 MG/1
TABLET ORAL
Qty: 10 TABLET | Refills: 0 | Status: SHIPPED | OUTPATIENT
Start: 2025-05-26 | End: 2025-06-04

## 2025-05-26 NOTE — PATIENT INSTRUCTIONS
Go to [facility] emergency department to check in for outpatient radiology -- x-ray is already ordered, you do not need to be seen in the emergency department.  I will call with our x-ray results.     Recheck elevated blood pressure with primary care provider.

## 2025-05-26 NOTE — PROGRESS NOTES
Subjective   Patient ID: July Castillo is a 75 y.o. female. They present today with a chief complaint of left hand swelling (For 6 days).    History of Present Illness  See mdm       History provided by:  Patient   used: No        Past Medical History  Allergies as of 05/26/2025 - Reviewed 05/26/2025   Allergen Reaction Noted    Ibuprofen Swelling 12/18/2023    Naproxen Hives and Swelling 12/18/2023    Codeine Nausea/vomiting 12/18/2023    Lactose Other 05/19/2022    Mold Other 01/02/2024       Prescriptions Prior to Admission[1]     Medical History[2]    Surgical History[3]     reports that she has never smoked. She has never used smokeless tobacco. She reports current alcohol use of about 3.0 standard drinks of alcohol per week. She reports that she does not use drugs.    Review of Systems  Review of Systems   All other systems reviewed and are negative.                                 Objective    Vitals:    05/26/25 1031   BP: 180/81   BP Location: Left arm   Patient Position: Sitting   BP Cuff Size: Adult   Pulse: 71   Resp: 16   Temp: 36.3 °C (97.4 °F)   TempSrc: Temporal   SpO2: 97%   Weight: 64.9 kg (143 lb)     No LMP recorded (lmp unknown). Patient is postmenopausal.    Physical Exam  Vitals and nursing note reviewed.   Constitutional:       General: She is not in acute distress.     Appearance: Normal appearance. She is normal weight. She is not ill-appearing, toxic-appearing or diaphoretic.   HENT:      Head: Normocephalic and atraumatic.      Mouth/Throat:      Mouth: Mucous membranes are moist.   Eyes:      General: No scleral icterus.        Right eye: No discharge.         Left eye: No discharge.      Extraocular Movements: Extraocular movements intact.      Conjunctiva/sclera: Conjunctivae normal.      Pupils: Pupils are equal, round, and reactive to light.   Cardiovascular:      Rate and Rhythm: Normal rate and regular rhythm.   Pulmonary:      Effort: Pulmonary effort is  normal.   Abdominal:      General: Abdomen is flat.   Musculoskeletal:         General: Swelling and tenderness present. No deformity or signs of injury. Normal range of motion.      Cervical back: Normal range of motion and neck supple. No rigidity or tenderness.      Comments: Left hand: edema, mild erythema to the dorsal aspect of the left wrist.  Passive ROM intact.  No bony deformity.  No tenderness of the hand, fingers, forearm.  ROM of the fingers, elbow normal.  Compartments soft.  NVI    Lymphadenopathy:      Cervical: No cervical adenopathy.   Skin:     General: Skin is warm and dry.      Capillary Refill: Capillary refill takes less than 2 seconds.      Coloration: Skin is not jaundiced or pale.      Findings: No rash.   Neurological:      General: No focal deficit present.      Mental Status: She is alert.      Gait: Gait normal.   Psychiatric:         Mood and Affect: Mood normal.         Behavior: Behavior normal.         Procedures    Point of Care Test & Imaging Results from this visit  No results found for this visit on 05/26/25.   Imaging  No results found.    Cardiology, Vascular, and Other Imaging  No other imaging results found for the past 2 days      Diagnostic study results (if any) were reviewed by Mayra Anne PA-C.    Assessment/Plan   Allergies, medications, history, and pertinent labs/EKGs/Imaging reviewed by Mayra Anne PA-C.     Medical Decision Making  75 year old F presents with complaint of left wrist pain.  Patient reports long history of flares of joint pain, swelling, redness of varying joints.  No recent injury.  She states symptoms today as similar.  No systemic symptoms.  She is scheduled to see a rheumatologist, is looking for pain control in the meantime.  Steroids have helped in the past.  She does not know the cause of her joint pain.  Exam as above.  Consider gouty arthritis, RA, OA - encouraged follow up with rheumatologist.  No features of septic arthritis.   X-ray with significant degenerative changes.  Do not clinically suspect fracture despite radiology inability to exclude given no injury and multiple similar flares in the past.  Encouraged follow-up with primary care provider.  Discussed expected course, indications for return or for presentation to emergency department.  Discharged good condition agreeable to plan as discussed.      Orders and Diagnoses  Diagnoses and all orders for this visit:  Arthritis of left wrist  -     predniSONE (Deltasone) 20 mg tablet; Take 2 tablets (40 mg) by mouth once daily for 3 days, THEN 1.5 tablets (30 mg) once daily for 2 days, THEN 1 tablet (20 mg) once daily for 2 days, THEN 0.5 tablets (10 mg) once daily for 2 days.      Medical Admin Record      Patient disposition: Home    Electronically signed by Mayra Anne PA-C  11:25 AM           [1] (Not in a hospital admission)   [2]   Past Medical History:  Diagnosis Date    Allergic 2010    Arthritis 2010    Cervical disc disorder Year 2000    Disease of thyroid gland 2010    Heart murmur Child    Hyperlipidemia     Hypertension 2010    Hypothyroidism     Low back pain 2000    Lumbosacral disc disease 2000    Lumbosacral spondylosis with radiculopathy     Neck pain 2015    Osteopenia     Vertigo     remote history   [3]   Past Surgical History:  Procedure Laterality Date    CARPAL TUNNEL RELEASE  03/2025    RIGHT WRIST    COLONOSCOPY  06/22/2022    SPINAL FUSION  05/24/2024    L3-5 TLIF/decompression with posterolateral instrumented fusion    TUMOR EXCISION  2005    Pleomorphic adenoma of parotid    WISDOM TOOTH EXTRACTION  1980

## 2025-05-27 ENCOUNTER — HOSPITAL ENCOUNTER (OUTPATIENT)
Dept: RADIOLOGY | Facility: HOSPITAL | Age: 75
Discharge: HOME | End: 2025-05-27
Payer: MEDICARE

## 2025-05-27 DIAGNOSIS — M19.032 ARTHRITIS OF LEFT WRIST: ICD-10-CM

## 2025-05-27 RX ORDER — ATORVASTATIN CALCIUM 20 MG/1
20 TABLET, FILM COATED ORAL DAILY
Qty: 180 TABLET | Refills: 0 | Status: SHIPPED | OUTPATIENT
Start: 2025-05-27

## 2025-06-09 NOTE — PATIENT INSTRUCTIONS
-Ice on and off for the next 24 hours if injection sites are sore. Do gentle range of motion exercises in each area that was injected. Try to do them every hour for about half a minute or so, in every direction that the affected part goes. No pool, bath, or hot tub today. Avoid heavy lifting for the next 2 days.    -Consider other medications in the future  -Follow up as scheduled

## 2025-06-09 NOTE — PROGRESS NOTES
Chief complaint: bilateral knee pain follow-up    July Castillo is a pleasant 75 y.o. female, with past medical history of HTN, HLD, hypothyroidism, and low back pain, who presents for follow-up of bilateral knee pain.     She was last seen here on 11/12/2024, at which point I advised her to try Voltaren gel.  Voltaren gel has not helped.    She underwent an EMG of the upper extremities with me on 12/19/2024 and it showed at least moderate median nerve dysfunction at the right wrist, even borderline severe.  She underwent right carpal tunnel release with Dr. Mariscal on 3/7/2025.  Follow-up note 3/17/2025 also personally reviewed today and indicated that patient was doing well postsurgery and Dr. Mariscal was pleased with her progress.    I ordered a right knee x-ray and this was done on 11/15/2024: Images and report personally reviewed interpreted today and discussed with the patient.    Right knee x-ray 11/15/2024:  IMPRESSION:  Mild-to-moderate osteoarthritis worse medially. Chondrocalcinosis    She is here today for Supartz injections for the bilateral knees, weekly for the next 5 weeks.  She has had this done 3 times before, she is never had only 3 doses.  She would consider doing only 3 with me to see if that helps sufficiently.    She rates her pain as 4-5/10, previously 6/10, worse with bending.     Otherwise, there have been no changes to her medications or past medical history since last visit    ________________________________  6/10/2025: Bilateral knee Supartz injections 1/3,  6/17/2025: Bilateral knee Supartz injections 2/3,  6/24/2025: Bilateral knee Supartz injections 3/3,      _______________________________  As a reminder:    TIMELINE OF COMPLAINT(S):  Remote right knee fracture treated with bracing.    [facility] Supartz gel injections in both knees - 10/16/23 in [place], [place], 5 weekly injections, worked well until 10/2024. She has undergone 3 series total, each lasting about 1 year.    She is in [place]  12/6-12/15.    She recents underwent a right L3-L5 facetectomy with posterolateral and transforaminal fusion with Dr. Wright on 5/24/24, last note form 9/19 reviewed showing 0 pain and stable hardware with fully healed incision. She was also seen by Dr. Mariscal for right wrist numbness and given a preliminary diagnosis of CTS pending EMG and NCV.  He advised her to wear wrist brace  At night.    Pain:  LOCATION- Bilateral knees, R>L, medial>lateral  RADIATION- no  CONSTANT or INTERMITTENT- Intermittent   SEVERITY/QUANTITY-3-4  QUALITY- aching, pressure, and sharp  WEAKNESS- Yes, left leg  NUMBNESS/TINGLING- Yes, left leg  ASSOCIATED WITH- no buckling, snapping, or catching.  EXACERBATED BY- Over use with kneeling/squatting since she isnt allowed to bend post surgery.  BETTER WITH- Rest  TRIED- Tylenol      Anti-Inflammatories: ibuprofen and naproxen allergies (caused hives/swelling), patient stated she has used voltaren gel without issue.      Muscle relaxants: flexeril, did not help      Anti-depressants:       Neuroleptics:      LDN:    PHYSICAL THERAPY: Yes, in [place]  CHIROPRACTIC MANIPULATION: Yes  TENS unit: Yes  ACUPUNCTURE TREATMENTS: No  DEEP TISSUE MASSAGE THERAPY: Yes  OSTEOPATHIC MANIPULATION THERAPY: No    EMG/NCS:  - 12/19/2024 with me: Moderate, may be borderline severe median nerve dysfunction at the right wrist.    INJECTIONS: Yes  Yes - supartz x5, steroid distant only in right knee.    IMAGING:    === 01/09/24 ===  MR LUMBAR SPINE WO CONTRAST  - Impression -  1. Severe narrowing thecal sac L4/5 in relation to anterolisthesis,  disc, facet and ligamentum flavum hypertrophy.  2. Mild narrowing thecal sac L3/4 as described above  3. Mild posterior disc osteophyte complex L5/S1 with subtle  asymmetric left paracentral disc protrusion abutting the exiting  emerging S1 nerve root.    === 09/19/24 ===  XR LUMBAR SPINE 2-3 VIEWS  - Impression -  Satisfactory appearance L3-L5 fusion.    Left knee 12/18/23  shows medial meniscal calcifications, patellofemoral degeneration.    === 05/27/25 ===  XR WRIST 3+ VIEWS LEFT  - Impression -  Marked soft tissue swelling and severe degenerative changes as above.  Underlying nondisplaced fracture can not be excluded. Further  evaluation with MRI or CT can be performed for better assessment as  clinically warranted.    Right knee x-ray 11/15/2024:  IMPRESSION:  Mild-to-moderate osteoarthritis worse medially. Chondrocalcinosis    Lumbar x-ray 3/27/2025:  IMPRESSION:  Stable postoperative findings consisting of posterior spinal fusion  from L3-L5 with interbody disc replacements at L3-4 and L4-5 levels.      Lab Results   Component Value Date    HGBA1C 5.3 05/10/2024       FUNCTIONAL HISTORY: The patient is independent in all ADLs, mobility, and driving. The patient does not use any assistive device.    SH:  Lives in: [place]  Lives with: Self  Occupation: Retired  Tobacco: No  Alcohol: Yes, 1-2 glasses a week or none  Drugs: No  __________________________________    ROS: The patient denies any bowel or bladder incontinence/accidents, night sweats, fevers, chills, recent significant weight loss. A 14 point review of systems was reviewed with the patient and is as above and otherwise negative.  ROS questionnaire positive for joint pain that varies.    PHYSICAL EXAM    GEN - Alert, well-developed, well-nourished, no acute distress  PSYCH - Cooperative, appropriate mood and affect  HEENT - NC/AT  RESP - Non-labored respirations, equal expansion  CV - warm and well-perfused, No cyanosis or edema in extremities.  ABD- soft, ND  SKIN - No rash.    Previously:    Knee: No warmth or erythema on right, mild warmth and erythema on left, mild effusion bilaterally.  No popliteal fullness.  No anterior, posterior, medial or lateral instability.  pes anserine tenderness mildly on left.  There is no pain with hyperflexion of the knee without weight-bearing except slightly at the very endpoint of  deep knee flexion on the left.  There is no pain with varus or valgus stressing of the knee during flexion and extension.  There is no crepitus.  There is no lateral joint line tenderness but louis and posteromedial jointline is tender bilaterally, R>L. There is no pain with patellar compression bilaterally except slight discomfort on the left    NEURO -   LE strength 5/5 -  including hip flexors, knee flexors, knee extensors, ankle dorsiflexors, ankle plantar flexors, and EHL, bilaterally.  Sensation - intact to light touch in bilateral lower extremities.   Reflexes - 2+ patellar and diminished achilles reflexes bilaterally No Clonus,   GAIT - Normal base, normal stride length, non-antalgic, mild medial collapse at knees and left truncal weakness. Able to toe walk and heel walk without difficulty. Able to perform tandem gait without difficulty.    PROCEDURE:    Supartz FX MG/ 2.5 ML X 2  Product code: [id number]  Lot: [id number]  Exp: 09/30/2027  Manuf: Primitivo  [facility] unit: 1    Anterior bilateral lateral knee Supartz injection:    Description of the Procedure: The procedure, risks and alternative treatments were discussed with the patient. After written informed consent was obtained, the injection site was identified and the skin was prepped three times with alcohol and then betadine swabs. In a sterile fashion, using a 27 gauge 1.5 inch needle, after negative aspiration, the site was injected with a total of 2.5 ml Supartz.  The patient tolerated the procedure well with no immediate complications or bleeding. She was able to perform range of motion exercises immediately postprocedure, and had no difficulties with ambulation.    Plan:   1. The patient was instructed in post-procedural care.  2. The patient was asked to apply moist heat and or ice for the next 24 hours and to perform daily gentle stretching exercises.     Physical Exam  Musculoskeletal:        Legs:         IMPRESSION:   July is a 75 y.o.  female with past medical history of HTN, HLD, hypothyroidism, and low back pain, who presents for follow-up of bilateral knee pain.  The patient's symptoms and physical exam findings are suggestive of osteoarthritis of bilateral knees.  The other possible differential diagnosis(es) include(s):  meniscal calcification, pes anserine syndrome on left could be component of pain.     -Bilateral knee Supartz 1/3 injections performed as above.  There were no complications and tolerated the procedure well.  She was provided with postprocedure instructions.  -Consider other medications in the future  -Follow up as scheduled      The patient expressed understanding and agreement with the assessment and plan. Patient encouraged to contact us should they have any questions, concerns, or any changes in symptoms.     Thank you for allowing me to participate in the care of your patient.    ** Dictated with voice recognition software, please forgive any errors in grammar and/or spelling **

## 2025-06-10 ENCOUNTER — APPOINTMENT (OUTPATIENT)
Dept: PHYSICAL MEDICINE AND REHAB | Facility: CLINIC | Age: 75
End: 2025-06-10
Payer: MEDICARE

## 2025-06-10 VITALS
SYSTOLIC BLOOD PRESSURE: 155 MMHG | WEIGHT: 150 LBS | TEMPERATURE: 97.5 F | OXYGEN SATURATION: 97 % | DIASTOLIC BLOOD PRESSURE: 81 MMHG | HEIGHT: 64 IN | BODY MASS INDEX: 25.61 KG/M2 | HEART RATE: 67 BPM

## 2025-06-10 DIAGNOSIS — G89.29 CHRONIC PAIN OF BOTH KNEES: ICD-10-CM

## 2025-06-10 DIAGNOSIS — M17.0 PRIMARY OSTEOARTHRITIS OF BOTH KNEES: Primary | ICD-10-CM

## 2025-06-10 DIAGNOSIS — M25.561 CHRONIC PAIN OF BOTH KNEES: ICD-10-CM

## 2025-06-10 DIAGNOSIS — G56.01 CARPAL TUNNEL SYNDROME ON RIGHT: ICD-10-CM

## 2025-06-10 DIAGNOSIS — M25.561 CHRONIC PAIN OF RIGHT KNEE: ICD-10-CM

## 2025-06-10 DIAGNOSIS — M25.562 CHRONIC PAIN OF BOTH KNEES: ICD-10-CM

## 2025-06-10 DIAGNOSIS — G89.29 CHRONIC PAIN OF RIGHT KNEE: ICD-10-CM

## 2025-06-10 ASSESSMENT — PAIN SCALES - GENERAL: PAINLEVEL_OUTOF10: 5

## 2025-06-16 NOTE — PROGRESS NOTES
Chief complaint: bilateral knee pain follow-up    July Castillo is a pleasant 75 y.o. female, with past medical history of HTN, HLD, hypothyroidism, and low back pain, who presents for follow-up of bilateral knee pain.     She was last seen here on 6/10/2025 , at which point we did the first round of Supartz injections bilaterally.  Her right knee has been feeling better but she is still having some left knee pain and stiffness.    She is here today for second round of Supartz injections for the bilateral knees, weekly for the next 3 weeks.  She previously had to stop 5 weeks in a row, but will consider just doing 3 weeks.    She rates her pain as 1-2/10 on the right and 4/10 on the left, previously 3-4/10.    Otherwise, there have been no changes to her medications or past medical history since last visit    ________________________________  6/10/2025: Bilateral knee Supartz injections 1/3,  6/17/2025: Bilateral knee Supartz injections 2/3,  6/24/2025: Bilateral knee Supartz injections 3/3,      _______________________________  As a reminder:    TIMELINE OF COMPLAINT(S):  Remote right knee fracture treated with bracing.    [facility] Supartz gel injections in both knees - 10/16/23 in [place], [place], 5 weekly injections, worked well until 10/2024. She has undergone 3 series total, each lasting about 1 year.    She is in [place] 12/6-12/15.    She recents underwent a right L3-L5 facetectomy with posterolateral and transforaminal fusion with Dr. Wright on 5/24/24, last note form 9/19 reviewed showing 0 pain and stable hardware with fully healed incision. She was also seen by Dr. Mariscal for right wrist numbness and given a preliminary diagnosis of CTS pending EMG and NCV.  He advised her to wear wrist brace  At night.    Pain:  LOCATION- Bilateral knees, R>L, medial>lateral  RADIATION- no  CONSTANT or INTERMITTENT- Intermittent   SEVERITY/QUANTITY-3-4  QUALITY- aching, pressure, and sharp  WEAKNESS- Yes, left leg  NUMBNESS/TINGLING-  Yes, left leg  ASSOCIATED WITH- no buckling, snapping, or catching.  EXACERBATED BY- Over use with kneeling/squatting since she isnt allowed to bend post surgery.  BETTER WITH- Rest  TRIED- Tylenol      Anti-Inflammatories: ibuprofen and naproxen allergies (caused hives/swelling), patient stated she has used voltaren gel without issue.      Muscle relaxants: flexeril, did not help      Anti-depressants:       Neuroleptics:      LDN:    PHYSICAL THERAPY: Yes, in [place]  CHIROPRACTIC MANIPULATION: Yes  TENS unit: Yes  ACUPUNCTURE TREATMENTS: No  DEEP TISSUE MASSAGE THERAPY: Yes  OSTEOPATHIC MANIPULATION THERAPY: No    EMG/NCS:  - 12/19/2024 with me: Moderate, may be borderline severe median nerve dysfunction at the right wrist.    INJECTIONS: Yes  Yes - supartz x5, steroid distant only in right knee.    IMAGING:    === 01/09/24 ===  MR LUMBAR SPINE WO CONTRAST  - Impression -  1. Severe narrowing thecal sac L4/5 in relation to anterolisthesis,  disc, facet and ligamentum flavum hypertrophy.  2. Mild narrowing thecal sac L3/4 as described above  3. Mild posterior disc osteophyte complex L5/S1 with subtle  asymmetric left paracentral disc protrusion abutting the exiting emerging S1 nerve root.    === 09/19/24 ===  XR LUMBAR SPINE 2-3 VIEWS  - Impression -  Satisfactory appearance L3-L5 fusion.    Left knee 12/18/23 shows medial meniscal calcifications, patellofemoral degeneration.    === 05/27/25 ===  XR WRIST 3+ VIEWS LEFT  - Impression -  Marked soft tissue swelling and severe degenerative changes as above.  Underlying nondisplaced fracture can not be excluded. Further evaluation with MRI or CT can be performed for better assessment as clinically warranted.    Right knee x-ray 11/15/2024:  IMPRESSION:  Mild-to-moderate osteoarthritis worse medially. Chondrocalcinosis    Lumbar x-ray 3/27/2025:  IMPRESSION:  Stable postoperative findings consisting of posterior spinal fusion from L3-L5 with interbody disc replacements  at L3-4 and L4-5 levels.      Lab Results   Component Value Date    HGBA1C 5.3 05/10/2024       FUNCTIONAL HISTORY: The patient is independent in all ADLs, mobility, and driving. The patient does not use any assistive device.    SH:  Lives in: [place]  Lives with: Self  Occupation: Retired  Tobacco: No  Alcohol: Yes, 1-2 glasses a week or none  Drugs: No  __________________________________    ROS: The patient denies any bowel or bladder incontinence/accidents, night sweats, fevers, chills, recent significant weight loss. A 14 point review of systems was reviewed with the patient and is as above and otherwise negative.  ROS questionnaire positive for joint pain     PHYSICAL EXAM    GEN - Alert, well-developed, well-nourished, no acute distress  PSYCH - Cooperative, appropriate mood and affect  HEENT - NC/AT  RESP - Non-labored respirations, equal expansion  CV - warm and well-perfused, No cyanosis or edema in extremities.  ABD- soft, ND  SKIN - No rash.    Previously:    Knee: No warmth or erythema on right, mild warmth and erythema on left, mild effusion bilaterally.  No popliteal fullness.  No anterior, posterior, medial or lateral instability.  pes anserine tenderness mildly on left.  There is no pain with hyperflexion of the knee without weight-bearing except slightly at the very endpoint of deep knee flexion on the left.  There is no pain with varus or valgus stressing of the knee during flexion and extension.  There is no crepitus.  There is no lateral joint line tenderness but louis and posteromedial jointline is tender bilaterally, R>L. There is no pain with patellar compression bilaterally except slight discomfort on the left    NEURO -   LE strength 5/5 -  including hip flexors, knee flexors, knee extensors, ankle dorsiflexors, ankle plantar flexors, and EHL, bilaterally.  Sensation - intact to light touch in bilateral lower extremities.   Reflexes - 2+ patellar and diminished achilles reflexes bilaterally  No Clonus,   GAIT - Normal base, normal stride length, non-antalgic, mild medial collapse at knees and left truncal weakness. Able to toe walk and heel walk without difficulty. Able to perform tandem gait without difficulty.    PROCEDURE:  Buy and bill  Supartz FX MG/ 2.5 ML X 2  Product code: [id number]  Lot: [id number]  Exp: 09/30/2027  Reedf: Primitivo Baker unit: 1    Anterior bilateral lateral knee Supartz injection:    Description of the Procedure: The procedure, risks and alternative treatments were discussed with the patient. After written informed consent was obtained, the injection site was identified and the skin was prepped three times with alcohol and then betadine swabs. In a sterile fashion, using a 27 gauge 1.5 inch needle, after negative aspiration, the site was injected with a total of 2.5 ml Supartz.  The patient tolerated the procedure well with no immediate complications or bleeding. She was able to perform range of motion exercises immediately postprocedure, and had no difficulties with ambulation.    Plan:   1. The patient was instructed in post-procedural care.  2. The patient was asked to apply moist heat and or ice for the next 24 hours and to perform daily gentle stretching exercises.     Physical Exam  Musculoskeletal:        Legs:         IMPRESSION:   July is a 75 y.o. female with past medical history of HTN, HLD, hypothyroidism, and low back pain, who presents for follow-up of bilateral knee pain.  The patient's symptoms and physical exam findings are suggestive of osteoarthritis of bilateral knees.  The other possible differential diagnosis(es) include(s):  meniscal calcification, pes anserine syndrome on left could be component of pain.     -Bilateral knee Supartz 2/3 injections performed as above.  There were no complications and tolerated the procedure well.  She was provided with postprocedure instructions.  -Consider other medications in the future  -Follow up as  scheduled      The patient expressed understanding and agreement with the assessment and plan. Patient encouraged to contact us should they have any questions, concerns, or any changes in symptoms.     Thank you for allowing me to participate in the care of your patient.    ** Dictated with voice recognition software, please forgive any errors in grammar and/or spelling **

## 2025-06-17 ENCOUNTER — APPOINTMENT (OUTPATIENT)
Dept: PHYSICAL MEDICINE AND REHAB | Facility: CLINIC | Age: 75
End: 2025-06-17
Payer: MEDICARE

## 2025-06-17 VITALS
SYSTOLIC BLOOD PRESSURE: 163 MMHG | TEMPERATURE: 96.8 F | DIASTOLIC BLOOD PRESSURE: 78 MMHG | HEART RATE: 71 BPM | BODY MASS INDEX: 25.75 KG/M2 | WEIGHT: 150 LBS

## 2025-06-17 DIAGNOSIS — M17.0 PRIMARY OSTEOARTHRITIS OF BOTH KNEES: Primary | ICD-10-CM

## 2025-06-17 DIAGNOSIS — G89.29 CHRONIC PAIN OF RIGHT KNEE: ICD-10-CM

## 2025-06-17 DIAGNOSIS — M25.562 CHRONIC PAIN OF BOTH KNEES: ICD-10-CM

## 2025-06-17 DIAGNOSIS — G56.01 CARPAL TUNNEL SYNDROME ON RIGHT: ICD-10-CM

## 2025-06-17 DIAGNOSIS — G89.29 CHRONIC PAIN OF BOTH KNEES: ICD-10-CM

## 2025-06-17 DIAGNOSIS — M25.561 CHRONIC PAIN OF BOTH KNEES: ICD-10-CM

## 2025-06-17 DIAGNOSIS — M25.561 CHRONIC PAIN OF RIGHT KNEE: ICD-10-CM

## 2025-06-23 NOTE — PROGRESS NOTES
Chief complaint: bilateral knee pain follow-up    July Castillo is a pleasant 75 y.o. female, with past medical history of HTN, HLD, hypothyroidism, and low back pain, who presents for follow-up of bilateral knee pain.     She was last seen here on 6/17/2025, at which point we did the 2nd round of Supartz injections bilaterally.  She has been getting good relief on the right side, but slow relief on the left side, still achy and sore, especially after the last injection, she was extra sore for 3 to 4 days and wants to hold off on any left-sided injections today, but will consider it for next week.  She already has an appointment scheduled.    She is here today for 3rd round of Supartz injections for the bilateral knees. She previously had to stop 5 weeks in a row, but will consider just doing 3 weeks.    She rates her pain as 1-2/10 on the right and 4/10 on the left, previously 3-4/10.    Otherwise, there have been no changes to her medications or past medical history since last visit    ________________________________  6/10/2025: Bilateral knee Supartz injections 1/3,  6/17/2025: Bilateral knee Supartz injections 2/3,  6/24/2025: Right knee Supartz injections 3/3,  7/1/25:    _______________________________  As a reminder:    TIMELINE OF COMPLAINT(S):  Remote right knee fracture treated with bracing.    [facility] Supartz gel injections in both knees - 10/16/23 in [place], [place], 5 weekly injections, worked well until 10/2024. She has undergone 3 series total, each lasting about 1 year.    She is in [place] 12/6-12/15.    She recents underwent a right L3-L5 facetectomy with posterolateral and transforaminal fusion with Dr. Wright on 5/24/24, last note form 9/19 reviewed showing 0 pain and stable hardware with fully healed incision. She was also seen by Dr. Mariscal for right wrist numbness and given a preliminary diagnosis of CTS pending EMG and NCV.  He advised her to wear wrist brace  At night.    Pain:  LOCATION- Bilateral knees,  R>L, medial>lateral  RADIATION- no  CONSTANT or INTERMITTENT- Intermittent   SEVERITY/QUANTITY-3-4  QUALITY- aching, pressure, and sharp  WEAKNESS- Yes, left leg  NUMBNESS/TINGLING- Yes, left leg  ASSOCIATED WITH- no buckling, snapping, or catching.  EXACERBATED BY- Over use with kneeling/squatting since she isnt allowed to bend post surgery.  BETTER WITH- Rest  TRIED- Tylenol      Anti-Inflammatories: ibuprofen and naproxen allergies (caused hives/swelling), patient stated she has used voltaren gel without issue.      Muscle relaxants: flexeril, did not help      Anti-depressants:       Neuroleptics:      LDN:    PHYSICAL THERAPY: Yes, in [place]  CHIROPRACTIC MANIPULATION: Yes  TENS unit: Yes  ACUPUNCTURE TREATMENTS: No  DEEP TISSUE MASSAGE THERAPY: Yes  OSTEOPATHIC MANIPULATION THERAPY: No    EMG/NCS:  - 12/19/2024 with me: Moderate, may be borderline severe median nerve dysfunction at the right wrist.    INJECTIONS: Yes  Yes - supartz x5, steroid distant only in right knee.    IMAGING:    === 01/09/24 ===  MR LUMBAR SPINE WO CONTRAST  - Impression -  1. Severe narrowing thecal sac L4/5 in relation to anterolisthesis,  disc, facet and ligamentum flavum hypertrophy.  2. Mild narrowing thecal sac L3/4 as described above  3. Mild posterior disc osteophyte complex L5/S1 with subtle  asymmetric left paracentral disc protrusion abutting the exiting emerging S1 nerve root.    === 09/19/24 ===  XR LUMBAR SPINE 2-3 VIEWS  - Impression -  Satisfactory appearance L3-L5 fusion.    Left knee 12/18/23 shows medial meniscal calcifications, patellofemoral degeneration.    === 05/27/25 ===  XR WRIST 3+ VIEWS LEFT  - Impression -  Marked soft tissue swelling and severe degenerative changes as above.  Underlying nondisplaced fracture can not be excluded. Further evaluation with MRI or CT can be performed for better assessment as clinically warranted.    Right knee x-ray 11/15/2024:  IMPRESSION:  Mild-to-moderate osteoarthritis  worse medially. Chondrocalcinosis    Lumbar x-ray 3/27/2025:  IMPRESSION:  Stable postoperative findings consisting of posterior spinal fusion from L3-L5 with interbody disc replacements at L3-4 and L4-5 levels.      Lab Results   Component Value Date    HGBA1C 5.3 05/10/2024       FUNCTIONAL HISTORY: The patient is independent in all ADLs, mobility, and driving. The patient does not use any assistive device.    SH:  Lives in: [place]  Lives with: Self  Occupation: Retired  Tobacco: No  Alcohol: Yes, 1-2 glasses a week or none  Drugs: No  __________________________________    ROS: The patient denies any bowel or bladder incontinence/accidents, night sweats, fevers, chills, recent significant weight loss. A 14 point review of systems was reviewed with the patient and is as above and otherwise negative.  ROS questionnaire positive for joint pain     PHYSICAL EXAM    GEN - Alert, well-developed, well-nourished, no acute distress  PSYCH - Cooperative, appropriate mood and affect  HEENT - NC/AT  RESP - Non-labored respirations, equal expansion  CV - warm and well-perfused, No cyanosis or edema in extremities.  ABD- soft, ND  SKIN - No rash.    Previously:    Knee: No warmth or erythema on right, mild warmth and erythema on left, mild effusion bilaterally.  No popliteal fullness.  No anterior, posterior, medial or lateral instability.  pes anserine tenderness mildly on left.  There is no pain with hyperflexion of the knee without weight-bearing except slightly at the very endpoint of deep knee flexion on the left.  There is no pain with varus or valgus stressing of the knee during flexion and extension.  There is no crepitus.  There is no lateral joint line tenderness but louis and posteromedial jointline is tender bilaterally, R>L. There is no pain with patellar compression bilaterally except slight discomfort on the left    NEURO -   LE strength 5/5 -  including hip flexors, knee flexors, knee extensors, ankle  dorsiflexors, ankle plantar flexors, and EHL, bilaterally.  Sensation - intact to light touch in bilateral lower extremities.   Reflexes - 2+ patellar and diminished achilles reflexes bilaterally No Clonus,   GAIT - Normal base, normal stride length, non-antalgic, mild medial collapse at knees and left truncal weakness. Able to toe walk and heel walk without difficulty. Able to perform tandem gait without difficulty.    PROCEDURE:  Buy and bill  Supartz FX MG/ 2.5 ML X 1  Product code: [id number]  Lot: [id number]  Exp: 09/30/2027  Manuf: Primitivo Baker unit: 1    Anterior right lateral knee Supartz injection:    Description of the Procedure: The procedure, risks and alternative treatments were discussed with the patient. After written informed consent was obtained, the injection site was identified and the skin was prepped three times with alcohol and then betadine swabs. In a sterile fashion, using a 27 gauge 1.5 inch needle, after negative aspiration, the site was injected with a total of 2.5 ml Supartz.  The patient tolerated the procedure well with no immediate complications or bleeding. She was able to perform range of motion exercises immediately postprocedure, and had no difficulties with ambulation.    Plan:   1. The patient was instructed in post-procedural care.  2. The patient was asked to apply moist heat and or ice for the next 24 hours and to perform daily gentle stretching exercises.     Physical Exam  Musculoskeletal:        Legs:         IMPRESSION:   July is a 75 y.o. female with past medical history of HTN, HLD, hypothyroidism, and low back pain, who presents for follow-up of bilateral knee pain.  The patient's symptoms and physical exam findings are suggestive of osteoarthritis of bilateral knees.  The other possible differential diagnosis(es) include(s):  meniscal calcification, pes anserine syndrome on left could be component of pain.     - Right knee Supartz 3/3 injections performed  as above.  There were no complications and tolerated the procedure well.  She was provided with postprocedure instructions.  -We held off on the left side today.  -Consider other medications in the future  -Follow up as scheduled      The patient expressed understanding and agreement with the assessment and plan. Patient encouraged to contact us should they have any questions, concerns, or any changes in symptoms.     Thank you for allowing me to participate in the care of your patient.    ** Dictated with voice recognition software, please forgive any errors in grammar and/or spelling **

## 2025-06-24 ENCOUNTER — APPOINTMENT (OUTPATIENT)
Dept: PHYSICAL MEDICINE AND REHAB | Facility: CLINIC | Age: 75
End: 2025-06-24
Payer: MEDICARE

## 2025-06-24 VITALS
SYSTOLIC BLOOD PRESSURE: 148 MMHG | WEIGHT: 151 LBS | TEMPERATURE: 97.8 F | DIASTOLIC BLOOD PRESSURE: 83 MMHG | BODY MASS INDEX: 25.78 KG/M2 | HEART RATE: 74 BPM | HEIGHT: 64 IN | OXYGEN SATURATION: 95 %

## 2025-06-24 DIAGNOSIS — M17.0 PRIMARY OSTEOARTHRITIS OF BOTH KNEES: Primary | ICD-10-CM

## 2025-06-24 DIAGNOSIS — M25.561 CHRONIC PAIN OF BOTH KNEES: ICD-10-CM

## 2025-06-24 DIAGNOSIS — G89.29 CHRONIC PAIN OF RIGHT KNEE: ICD-10-CM

## 2025-06-24 DIAGNOSIS — G56.01 CARPAL TUNNEL SYNDROME ON RIGHT: ICD-10-CM

## 2025-06-24 DIAGNOSIS — M25.561 CHRONIC PAIN OF RIGHT KNEE: ICD-10-CM

## 2025-06-24 DIAGNOSIS — G89.29 CHRONIC PAIN OF BOTH KNEES: ICD-10-CM

## 2025-06-24 DIAGNOSIS — M25.562 CHRONIC PAIN OF BOTH KNEES: ICD-10-CM

## 2025-06-24 PROCEDURE — 20610 DRAIN/INJ JOINT/BURSA W/O US: CPT | Performed by: PHYSICAL MEDICINE & REHABILITATION

## 2025-06-24 ASSESSMENT — PAIN SCALES - GENERAL: PAINLEVEL_OUTOF10: 4

## 2025-06-30 NOTE — PATIENT INSTRUCTIONS
-Ice on and off for the next 24 hours if injection sites are sore. Do gentle range of motion exercises in each area that was injected. Try to do them every hour for about half a minute or so, in every direction that the affected part goes. No pool, bath, or hot tub today. Avoid heavy lifting for the next 2 days.    -Consider other medications in the future  -Follow up as needed.  If these did not help sufficiently, we can do the 4th and 5th rounds.  Otherwise let me know when they start wearing off after 6 months so we can order more.

## 2025-06-30 NOTE — PROGRESS NOTES
Chief complaint: bilateral knee pain follow-up    July Castillo is a pleasant 75 y.o. female, with past medical history of HTN, HLD, hypothyroidism, and low back pain, who presents for follow-up of bilateral knee pain.     She was last seen here on 6/24/2025, at which point we did the 2nd round of Supartz injections only on the right side.  Her left side was still hurting so she wanted to hold off that week and she is here today to do the left side.    She is here today for 3rd round of Supartz injections for the left knee. She previously had them 5 weeks in a row, but will consider just doing 3 weeks.  She will let me know if she wants to do the other 2 in the near future.    Right side feeling pretty good, left side still hurting.    She rates her pain as 3-4/10, previously 3-4/10.    Otherwise, there have been no changes to her medications or past medical history since last visit    ________________________________  6/10/2025: Bilateral knee Supartz injections 1/3,  6/17/2025: Bilateral knee Supartz injections 2/3,  6/24/2025: Right knee Supartz injections 3/3,  7/1/25: Left knee Supartz injection 3/3,    _______________________________  As a reminder:    TIMELINE OF COMPLAINT(S):  Remote right knee fracture treated with bracing.    [facility] Supartz gel injections in both knees - 10/16/23 in [place], [place], 5 weekly injections, worked well until 10/2024. She has undergone 3 series total, each lasting about 1 year.    She is in [place] 12/6-12/15.    She recents underwent a right L3-L5 facetectomy with posterolateral and transforaminal fusion with Dr. Wright on 5/24/24, last note form 9/19 reviewed showing 0 pain and stable hardware with fully healed incision. She was also seen by Dr. Mariscal for right wrist numbness and given a preliminary diagnosis of CTS pending EMG and NCV.  He advised her to wear wrist brace  At night.    Pain:  LOCATION- Bilateral knees, R>L, medial>lateral  RADIATION- no  CONSTANT or INTERMITTENT-  Intermittent   SEVERITY/QUANTITY-3-4  QUALITY- aching, pressure, and sharp  WEAKNESS- Yes, left leg  NUMBNESS/TINGLING- Yes, left leg  ASSOCIATED WITH- no buckling, snapping, or catching.  EXACERBATED BY- Over use with kneeling/squatting since she isnt allowed to bend post surgery.  BETTER WITH- Rest  TRIED- Tylenol      Anti-Inflammatories: ibuprofen and naproxen allergies (caused hives/swelling), patient stated she has used voltaren gel without issue.      Muscle relaxants: flexeril, did not help      Anti-depressants:       Neuroleptics:      LDN:    PHYSICAL THERAPY: Yes, in [place]  CHIROPRACTIC MANIPULATION: Yes  TENS unit: Yes  ACUPUNCTURE TREATMENTS: No  DEEP TISSUE MASSAGE THERAPY: Yes  OSTEOPATHIC MANIPULATION THERAPY: No    EMG/NCS:  - 12/19/2024 with me: Moderate, may be borderline severe median nerve dysfunction at the right wrist.    INJECTIONS: Yes  Yes - supartz x5, steroid distant only in right knee.    IMAGING:    === 01/09/24 ===  MR LUMBAR SPINE WO CONTRAST  - Impression -  1. Severe narrowing thecal sac L4/5 in relation to anterolisthesis, disc, facet and ligamentum flavum hypertrophy.  2. Mild narrowing thecal sac L3/4 as described above  3. Mild posterior disc osteophyte complex L5/S1 with subtle  asymmetric left paracentral disc protrusion abutting the exiting emerging S1 nerve root.    === 09/19/24 ===  XR LUMBAR SPINE 2-3 VIEWS  - Impression -  Satisfactory appearance L3-L5 fusion.    Left knee 12/18/23 shows medial meniscal calcifications, patellofemoral degeneration.    === 05/27/25 ===  XR WRIST 3+ VIEWS LEFT  - Impression -  Marked soft tissue swelling and severe degenerative changes as above. Underlying nondisplaced fracture can not be excluded. Further evaluation with MRI or CT can be performed for better assessment as clinically warranted.    Right knee x-ray 11/15/2024:  IMPRESSION:  Mild-to-moderate osteoarthritis worse medially. Chondrocalcinosis    Lumbar x-ray  3/27/2025:  IMPRESSION:  Stable postoperative findings consisting of posterior spinal fusion from L3-L5 with interbody disc replacements at L3-4 and L4-5 levels.      Lab Results   Component Value Date    HGBA1C 5.3 05/10/2024       FUNCTIONAL HISTORY: The patient is independent in all ADLs, mobility, and driving. The patient does not use any assistive device.    SH:  Lives in: [place]  Lives with: Self  Occupation: Retired  Tobacco: No  Alcohol: Yes, 1-2 glasses a week or none  Drugs: No  __________________________________    ROS: The patient denies any bowel or bladder incontinence/accidents, night sweats, fevers, chills, recent significant weight loss. A 14 point review of systems was reviewed with the patient and is as above and otherwise negative.  ROS questionnaire positive for joint pain , N/T L toes    PHYSICAL EXAM    GEN - Alert, well-developed, well-nourished, no acute distress  PSYCH - Cooperative, appropriate mood and affect  HEENT - NC/AT  RESP - Non-labored respirations, equal expansion  CV - warm and well-perfused, No cyanosis or edema in extremities.  ABD- soft, ND  SKIN - No rash.    Previously:    Knee: No warmth or erythema on right, mild warmth and erythema on left, mild effusion bilaterally.  No popliteal fullness.  No anterior, posterior, medial or lateral instability.  pes anserine tenderness mildly on left.  There is no pain with hyperflexion of the knee without weight-bearing except slightly at the very endpoint of deep knee flexion on the left.  There is no pain with varus or valgus stressing of the knee during flexion and extension.  There is no crepitus.  There is no lateral joint line tenderness but louis and posteromedial jointline is tender bilaterally, R>L. There is no pain with patellar compression bilaterally except slight discomfort on the left    NEURO -   LE strength 5/5 -  including hip flexors, knee flexors, knee extensors, ankle dorsiflexors, ankle plantar flexors, and EHL,  bilaterally.  Sensation - intact to light touch in bilateral lower extremities.   Reflexes - 2+ patellar and diminished achilles reflexes bilaterally No Clonus,   GAIT - Normal base, normal stride length, non-antalgic, mild medial collapse at knees and left truncal weakness. Able to toe walk and heel walk without difficulty. Able to perform tandem gait without difficulty.    PROCEDURE:  Buy and bill  Supartz FX MG/ 2.5 ML X 1  Product code: [id number]  Lot: [id number]  Exp: 09/30/2027  Manuf: Primitivo Baker unit: 1    Anterior left lateral knee Supartz injection:    Description of the Procedure: The procedure, risks and alternative treatments were discussed with the patient. After written informed consent was obtained, the injection site was identified and the skin was prepped three times with alcohol and then betadine swabs. In a sterile fashion, using a 27 gauge 1.5 inch needle, after negative aspiration, the site was injected with a total of 2.5 ml Supartz.  The patient tolerated the procedure well with no immediate complications or bleeding. She was able to perform range of motion exercises immediately postprocedure, and had no difficulties with ambulation.    Plan:   1. The patient was instructed in post-procedural care.  2. The patient was asked to apply moist heat and or ice for the next 24 hours and to perform daily gentle stretching exercises.     Physical Exam  Musculoskeletal:        Legs:         IMPRESSION:   July is a 75 y.o. female with past medical history of HTN, HLD, hypothyroidism, and low back pain, who presents for follow-up of bilateral knee pain.  The patient's symptoms and physical exam findings are suggestive of osteoarthritis of bilateral knees.  The other possible differential diagnosis(es) include(s):  meniscal calcification, pes anserine syndrome on left could be component of pain.     - Left knee Supartz 3/3 injections performed as above.  There were no complications and  tolerated the procedure well.  She was provided with postprocedure instructions.  -Consider other medications in the future  -Follow up as needed.  If these did not help sufficiently, we can do the 4th and 5th rounds.  Otherwise let me know when they start wearing off after 6 months so we can order more.      The patient expressed understanding and agreement with the assessment and plan. Patient encouraged to contact us should they have any questions, concerns, or any changes in symptoms.     Thank you for allowing me to participate in the care of your patient.    ** Dictated with voice recognition software, please forgive any errors in grammar and/or spelling **

## 2025-07-01 ENCOUNTER — APPOINTMENT (OUTPATIENT)
Dept: PHYSICAL MEDICINE AND REHAB | Facility: CLINIC | Age: 75
End: 2025-07-01
Payer: MEDICARE

## 2025-07-01 VITALS
BODY MASS INDEX: 25.61 KG/M2 | HEART RATE: 72 BPM | SYSTOLIC BLOOD PRESSURE: 151 MMHG | WEIGHT: 150 LBS | HEIGHT: 64 IN | DIASTOLIC BLOOD PRESSURE: 77 MMHG | OXYGEN SATURATION: 99 % | TEMPERATURE: 97.8 F

## 2025-07-01 DIAGNOSIS — G89.29 CHRONIC PAIN OF RIGHT KNEE: ICD-10-CM

## 2025-07-01 DIAGNOSIS — M25.561 CHRONIC PAIN OF BOTH KNEES: ICD-10-CM

## 2025-07-01 DIAGNOSIS — M17.0 PRIMARY OSTEOARTHRITIS OF BOTH KNEES: Primary | ICD-10-CM

## 2025-07-01 DIAGNOSIS — G89.29 CHRONIC PAIN OF BOTH KNEES: ICD-10-CM

## 2025-07-01 DIAGNOSIS — G56.01 CARPAL TUNNEL SYNDROME ON RIGHT: ICD-10-CM

## 2025-07-01 DIAGNOSIS — M25.562 CHRONIC PAIN OF BOTH KNEES: ICD-10-CM

## 2025-07-01 DIAGNOSIS — M25.561 CHRONIC PAIN OF RIGHT KNEE: ICD-10-CM

## 2025-07-01 PROCEDURE — 20610 DRAIN/INJ JOINT/BURSA W/O US: CPT | Performed by: PHYSICAL MEDICINE & REHABILITATION

## 2025-07-01 ASSESSMENT — PAIN SCALES - GENERAL: PAINLEVEL_OUTOF10: 4

## 2025-07-08 ENCOUNTER — CLINICAL SUPPORT (OUTPATIENT)
Dept: PRIMARY CARE | Facility: CLINIC | Age: 75
End: 2025-07-08
Payer: MEDICARE

## 2025-07-08 ENCOUNTER — APPOINTMENT (OUTPATIENT)
Dept: PHYSICAL MEDICINE AND REHAB | Facility: CLINIC | Age: 75
End: 2025-07-08
Payer: MEDICARE

## 2025-07-08 DIAGNOSIS — E03.9 HYPOTHYROIDISM, UNSPECIFIED TYPE: ICD-10-CM

## 2025-07-09 DIAGNOSIS — E03.9 ACQUIRED HYPOTHYROIDISM: ICD-10-CM

## 2025-07-09 DIAGNOSIS — E78.5 HYPERLIPIDEMIA, UNSPECIFIED HYPERLIPIDEMIA TYPE: ICD-10-CM

## 2025-07-09 LAB — TSH SERPL-ACNC: 1.24 MIU/L (ref 0.4–4.5)

## 2025-07-10 RX ORDER — ATORVASTATIN CALCIUM 20 MG/1
20 TABLET, FILM COATED ORAL DAILY
Qty: 180 TABLET | Refills: 1 | Status: SHIPPED | OUTPATIENT
Start: 2025-07-10

## 2025-07-10 RX ORDER — LEVOTHYROXINE SODIUM 75 UG/1
75 TABLET ORAL DAILY
Qty: 90 TABLET | Refills: 1 | Status: SHIPPED | OUTPATIENT
Start: 2025-07-10 | End: 2026-07-10

## 2025-07-14 ENCOUNTER — APPOINTMENT (OUTPATIENT)
Dept: RADIOLOGY | Facility: HOSPITAL | Age: 75
End: 2025-07-14
Payer: MEDICARE

## 2025-07-14 DIAGNOSIS — Z12.31 ENCOUNTER FOR SCREENING MAMMOGRAM FOR MALIGNANT NEOPLASM OF BREAST: ICD-10-CM

## 2025-07-14 PROCEDURE — 77063 BREAST TOMOSYNTHESIS BI: CPT | Performed by: STUDENT IN AN ORGANIZED HEALTH CARE EDUCATION/TRAINING PROGRAM

## 2025-07-14 PROCEDURE — 77067 SCR MAMMO BI INCL CAD: CPT | Performed by: STUDENT IN AN ORGANIZED HEALTH CARE EDUCATION/TRAINING PROGRAM

## 2025-07-14 PROCEDURE — 77067 SCR MAMMO BI INCL CAD: CPT

## 2025-07-28 ENCOUNTER — APPOINTMENT (OUTPATIENT)
Dept: PRIMARY CARE | Facility: CLINIC | Age: 75
End: 2025-07-28
Payer: MEDICARE

## 2025-08-04 ENCOUNTER — OFFICE VISIT (OUTPATIENT)
Dept: PRIMARY CARE | Facility: CLINIC | Age: 75
End: 2025-08-04
Payer: MEDICARE

## 2025-08-04 ENCOUNTER — APPOINTMENT (OUTPATIENT)
Dept: PRIMARY CARE | Facility: CLINIC | Age: 75
End: 2025-08-04
Payer: MEDICARE

## 2025-08-04 VITALS
SYSTOLIC BLOOD PRESSURE: 122 MMHG | BODY MASS INDEX: 25.58 KG/M2 | OXYGEN SATURATION: 98 % | DIASTOLIC BLOOD PRESSURE: 78 MMHG | WEIGHT: 149 LBS | HEART RATE: 87 BPM

## 2025-08-04 DIAGNOSIS — Z01.818 PRE-OP EXAMINATION: Primary | ICD-10-CM

## 2025-08-04 DIAGNOSIS — H25.89 OTHER AGE-RELATED CATARACT OF RIGHT EYE: ICD-10-CM

## 2025-08-04 DIAGNOSIS — H25.092 OTHER AGE-RELATED INCIPIENT CATARACT OF LEFT EYE: ICD-10-CM

## 2025-08-04 PROCEDURE — 3078F DIAST BP <80 MM HG: CPT | Performed by: NURSE PRACTITIONER

## 2025-08-04 PROCEDURE — 3074F SYST BP LT 130 MM HG: CPT | Performed by: NURSE PRACTITIONER

## 2025-08-04 PROCEDURE — 1036F TOBACCO NON-USER: CPT | Performed by: NURSE PRACTITIONER

## 2025-08-04 PROCEDURE — G2211 COMPLEX E/M VISIT ADD ON: HCPCS | Performed by: NURSE PRACTITIONER

## 2025-08-04 PROCEDURE — 1159F MED LIST DOCD IN RCRD: CPT | Performed by: NURSE PRACTITIONER

## 2025-08-04 PROCEDURE — 99213 OFFICE O/P EST LOW 20 MIN: CPT | Performed by: NURSE PRACTITIONER

## 2025-08-04 PROCEDURE — 1126F AMNT PAIN NOTED NONE PRSNT: CPT | Performed by: NURSE PRACTITIONER

## 2025-08-04 ASSESSMENT — ENCOUNTER SYMPTOMS
MUSCULOSKELETAL NEGATIVE: 1
CONSTITUTIONAL NEGATIVE: 1
RESPIRATORY NEGATIVE: 1
LOSS OF SENSATION IN FEET: 0
NEUROLOGICAL NEGATIVE: 1
CARDIOVASCULAR NEGATIVE: 1
GASTROINTESTINAL NEGATIVE: 1
DEPRESSION: 0
OCCASIONAL FEELINGS OF UNSTEADINESS: 0

## 2025-08-04 ASSESSMENT — PATIENT HEALTH QUESTIONNAIRE - PHQ9
SUM OF ALL RESPONSES TO PHQ9 QUESTIONS 1 AND 2: 0
2. FEELING DOWN, DEPRESSED OR HOPELESS: NOT AT ALL
1. LITTLE INTEREST OR PLEASURE IN DOING THINGS: NOT AT ALL

## 2025-08-04 ASSESSMENT — PAIN SCALES - GENERAL: PAINLEVEL_OUTOF10: 0-NO PAIN

## 2025-08-04 NOTE — PROGRESS NOTES
Subjective   Patient ID: July Castillo is a 75 y.o. female who presents for Clearance for surgery  (Patient here for surgical clearance, she's getting Cataract surgery ).    HPI     Review of Systems   Constitutional: Negative.    HENT: Negative.     Respiratory: Negative.     Cardiovascular: Negative.    Gastrointestinal: Negative.    Genitourinary: Negative.    Musculoskeletal: Negative.    Neurological: Negative.        Objective   /78 (BP Location: Left arm, Patient Position: Sitting, BP Cuff Size: Adult)   Pulse 87   Wt 67.6 kg (149 lb)   LMP  (LMP Unknown)   SpO2 98%   BMI 25.58 kg/m²     Physical Exam  Constitutional:       General: She is not in acute distress.     Appearance: Normal appearance.   HENT:      Right Ear: Tympanic membrane normal.      Left Ear: Tympanic membrane normal.     Cardiovascular:      Rate and Rhythm: Normal rate and regular rhythm.      Heart sounds: No murmur heard.  Pulmonary:      Breath sounds: Normal breath sounds. No wheezing.     Neurological:      Mental Status: She is alert.         Assessment/Plan   Problem List Items Addressed This Visit    None  Visit Diagnoses         Codes      Other age-related cataract of right eye    -  Primary H25.89      Other age-related incipient cataract of left eye     H25.092      Pre-op examination     Z01.818          Medically cleared for surgery

## 2026-03-31 ENCOUNTER — APPOINTMENT (OUTPATIENT)
Dept: RHEUMATOLOGY | Facility: CLINIC | Age: 76
End: 2026-03-31
Payer: MEDICARE

## (undated) DEVICE — Device

## (undated) DEVICE — FLOSEAL, MATRIX, HEMOSTATIC, FULL STERILE PREP, 5ML

## (undated) DEVICE — SUTURE, VICRYL, 0, 18 IN, CT-1, UNDYED

## (undated) DEVICE — CUFF, TOURNIQUET, 18 X 4, DUAL PORT/SNGL BLADDER, DISP, LF

## (undated) DEVICE — TRAY, DRY PREP, PREMIUM

## (undated) DEVICE — SKIN CLOSURE SYS, PREMIERPRO EXOFIN, 2-4CM X 30CM, 1.75G TUBE

## (undated) DEVICE — MARKER, SKIN, DUAL TIP INK W/9 LABEL AND REMOVABLE TIME OUT SLEEVE

## (undated) DEVICE — SUTURE, VICRYL, 4-0, 18 IN, PS2, UNDYED

## (undated) DEVICE — ELECTRODE, ELECTROSURGICAL, BLADE EXT 4 INCH, INSULATED

## (undated) DEVICE — TUBING, SUCTION, NON-CONDUCTIVE, W/CONNECT,.25 IN X 12 FT, STERILE, LF

## (undated) DEVICE — KIT, MINOR, DOUBLE BASIN

## (undated) DEVICE — TOWEL, SURGICAL, NEURO, O/R, 16 X 26, BLUE, STERILE

## (undated) DEVICE — SOLUTION, CHLORHEXIDINE, 4%, 4OZ

## (undated) DEVICE — ELECTROSURGICAL, HAND CONTROL, BUTTON  PENCIL, W/EDGE CAUTERY,W/SMOKE EVAC

## (undated) DEVICE — PAD, GROUNDING, ELECTROSURGICAL, W/9 FT CABLE, POLYHESIVE II, ADULT, LF

## (undated) DEVICE — DRESSING, NON-ADHERENT, 3 X 3 IN, STERILE

## (undated) DEVICE — DRAPE, INCISE, ANTIMICROBIAL, IOBAN 2, LARGE, 17 X 23 IN, DISPOSABLE, STERILE

## (undated) DEVICE — TIP, SUCTION, FRAZIER, W/CONTROL VENT, 10 FR

## (undated) DEVICE — SUTURE, PROLENE 4-0, 18IN, PS2, BLUE MONO

## (undated) DEVICE — DRAIN, WOUND, ROUND, W/TROCAR, HOLE PATTERN, 10 IN, MEDIUM, 1/8 X 49 IN

## (undated) DEVICE — KIT, PATIENT CARE, JACKSON TABLE W/PRONE-SAFE HEADREST

## (undated) DEVICE — DRAPE, MICROSCOPE, FOR ZEISS 65MM, VARI-LENS II, 52 X 150

## (undated) DEVICE — SPHERE, STEALTHSTATION, 5-PK

## (undated) DEVICE — CATHETER TRAY, SURESTEP, 16FR, URINE METER W/STATLOCK

## (undated) DEVICE — BONE, MILL, MIDAS REX, DUAL BLADE, ELECTRIC

## (undated) DEVICE — SYRINGE, LUER LOCK, 12ML

## (undated) DEVICE — WOUND SYSTEM, DEBRIDEMENT & CLEANING, O.R DUOPAK

## (undated) DEVICE — TIP, SUCTION, FRAZIER, W/CONTROL VENT, 12 FR

## (undated) DEVICE — EVACUATOR, WOUND, CLOSED, 3 SPRING, 400 CC, Y CONNECTING TUBE

## (undated) DEVICE — DRILL, NEURO, PRECISION, 3MM X 3.8MM, CARBIDE

## (undated) DEVICE — SPONGE GAUZE, XRAY SC+RFID, 4X4 16 PLY, STERILE

## (undated) DEVICE — NEEDLE, HYPODERMIC, 25 G X 1.5 IN, A BEVEL, STERILE

## (undated) DEVICE — BLANKET, LOWER BODY, VHA PLUS, ADULT

## (undated) DEVICE — GLOVE, SURGICAL, PROTEXIS PI , 7.5, PF, LF